# Patient Record
Sex: FEMALE | Race: BLACK OR AFRICAN AMERICAN | Employment: OTHER | URBAN - METROPOLITAN AREA
[De-identification: names, ages, dates, MRNs, and addresses within clinical notes are randomized per-mention and may not be internally consistent; named-entity substitution may affect disease eponyms.]

---

## 2017-01-18 ENCOUNTER — TELEPHONE (OUTPATIENT)
Dept: FAMILY MEDICINE CLINIC | Age: 77
End: 2017-01-18

## 2017-01-18 NOTE — TELEPHONE ENCOUNTER
Patient requesting a return call from Dr Edgar Post to discuss having a terrible cough, a headache and pain in her right shoulder. Her contact # is [phone].

## 2017-01-19 NOTE — TELEPHONE ENCOUNTER
Spoke with patient, ID verified X 2. Patient stated that she has a terrible cough, but it is clear. Patient also stated that she has a headache and right shoulder blade pain that she has for some time now. Patient stated that she has tried Tylenol but her pain comes back later on in the day. Patient denied shortness of breath or numbness. Patient was transferred to [facility] to schedule an office visit to see Dr. Ramón Cr or Dr. Jonah Mercedes.

## 2017-01-23 ENCOUNTER — OFFICE VISIT (OUTPATIENT)
Dept: FAMILY MEDICINE CLINIC | Age: 77
End: 2017-01-23

## 2017-01-23 VITALS
HEART RATE: 88 BPM | WEIGHT: 273 LBS | OXYGEN SATURATION: 96 % | RESPIRATION RATE: 18 BRPM | BODY MASS INDEX: 53.6 KG/M2 | TEMPERATURE: 98.3 F | HEIGHT: 60 IN | DIASTOLIC BLOOD PRESSURE: 60 MMHG | SYSTOLIC BLOOD PRESSURE: 98 MMHG

## 2017-01-23 DIAGNOSIS — H92.03 EAR PAIN, BILATERAL: ICD-10-CM

## 2017-01-23 DIAGNOSIS — M81.0 OSTEOPOROSIS: ICD-10-CM

## 2017-01-23 DIAGNOSIS — F02.80 DEMENTIA IN ALZHEIMER'S DISEASE WITH LATE ONSET: Chronic | ICD-10-CM

## 2017-01-23 DIAGNOSIS — R29.898 WEAKNESS OF BOTH LOWER EXTREMITIES: ICD-10-CM

## 2017-01-23 DIAGNOSIS — M25.572 CHRONIC PAIN OF BOTH ANKLES: ICD-10-CM

## 2017-01-23 DIAGNOSIS — M25.561 CHRONIC PAIN OF BOTH KNEES: ICD-10-CM

## 2017-01-23 DIAGNOSIS — Z90.79 S/P TAH-BSO (TOTAL ABDOMINAL HYSTERECTOMY AND BILATERAL SALPINGO-OOPHORECTOMY): ICD-10-CM

## 2017-01-23 DIAGNOSIS — J45.20 RAD (REACTIVE AIRWAY DISEASE) WITH WHEEZING, MILD INTERMITTENT, UNCOMPLICATED: ICD-10-CM

## 2017-01-23 DIAGNOSIS — G30.1 DEMENTIA IN ALZHEIMER'S DISEASE WITH LATE ONSET: Chronic | ICD-10-CM

## 2017-01-23 DIAGNOSIS — G89.29 CHRONIC PAIN OF BOTH KNEES: ICD-10-CM

## 2017-01-23 DIAGNOSIS — G89.29 CHRONIC PAIN OF BOTH ANKLES: ICD-10-CM

## 2017-01-23 DIAGNOSIS — M25.562 CHRONIC PAIN OF BOTH KNEES: ICD-10-CM

## 2017-01-23 DIAGNOSIS — M48.061 LUMBAR SPINAL STENOSIS: ICD-10-CM

## 2017-01-23 DIAGNOSIS — I10 ESSENTIAL HYPERTENSION: ICD-10-CM

## 2017-01-23 DIAGNOSIS — J30.89 SEASONAL ALLERGIC RHINITIS DUE TO OTHER ALLERGIC TRIGGER: ICD-10-CM

## 2017-01-23 DIAGNOSIS — G44.89 OTHER HEADACHE SYNDROME: ICD-10-CM

## 2017-01-23 DIAGNOSIS — M25.571 CHRONIC PAIN OF BOTH ANKLES: ICD-10-CM

## 2017-01-23 DIAGNOSIS — G44.229 CHRONIC TENSION-TYPE HEADACHE, NOT INTRACTABLE: ICD-10-CM

## 2017-01-23 DIAGNOSIS — R26.81 GAIT INSTABILITY: ICD-10-CM

## 2017-01-23 DIAGNOSIS — I87.8 LOWER EXTREMITY VENOUS STASIS: ICD-10-CM

## 2017-01-23 DIAGNOSIS — R25.1 TREMOR: ICD-10-CM

## 2017-01-23 DIAGNOSIS — Z90.722 S/P TAH-BSO (TOTAL ABDOMINAL HYSTERECTOMY AND BILATERAL SALPINGO-OOPHORECTOMY): ICD-10-CM

## 2017-01-23 DIAGNOSIS — H61.23 BILATERAL IMPACTED CERUMEN: ICD-10-CM

## 2017-01-23 DIAGNOSIS — R10.33 PERIUMBILICAL ABDOMINAL PAIN: ICD-10-CM

## 2017-01-23 DIAGNOSIS — I10 ESSENTIAL HYPERTENSION: Primary | ICD-10-CM

## 2017-01-23 DIAGNOSIS — Z90.710 S/P TAH-BSO (TOTAL ABDOMINAL HYSTERECTOMY AND BILATERAL SALPINGO-OOPHORECTOMY): ICD-10-CM

## 2017-01-23 DIAGNOSIS — H26.9 CATARACT: ICD-10-CM

## 2017-01-23 RX ORDER — GUAIFENESIN DEXTROMETHORPHAN HYDROBROMIDE ORAL SOLUTION 10; 100 MG/5ML; MG/5ML
10 SOLUTION ORAL
Qty: 236 ML | Refills: 0 | Status: SHIPPED | OUTPATIENT
Start: 2017-01-23 | End: 2017-01-25 | Stop reason: SDUPTHER

## 2017-01-23 RX ORDER — ALBUTEROL SULFATE 90 UG/1
2 AEROSOL, METERED RESPIRATORY (INHALATION)
Qty: 1 INHALER | Refills: 1 | Status: SHIPPED | OUTPATIENT
Start: 2017-01-23 | End: 2017-01-25 | Stop reason: SDUPTHER

## 2017-01-23 RX ORDER — LORATADINE 10 MG/1
10 TABLET ORAL DAILY
Qty: 30 TAB | Refills: 11 | Status: SHIPPED | OUTPATIENT
Start: 2017-01-23 | End: 2017-01-25 | Stop reason: SDUPTHER

## 2017-01-23 RX ORDER — MONTELUKAST SODIUM 10 MG/1
10 TABLET ORAL DAILY
Qty: 30 TAB | Refills: 11 | Status: SHIPPED | OUTPATIENT
Start: 2017-01-23 | End: 2017-01-25 | Stop reason: SDUPTHER

## 2017-01-23 NOTE — MR AVS SNAPSHOT
Visit Information Date & Time Provider Department Dept. Phone Encounter #  
 1/23/2017 10:30 AM [facility] [phone] [id number] Follow-up Instructions Return in about 4 months (around 5/23/2017) for referral follow up, results, leg weakness. Routing History Upcoming Health Maintenance Date Due  
 MEDICARE YEARLY EXAM 4/21/2017* GLAUCOMA SCREENING Q2Y 4/1/2017 DTaP/Tdap/Td series (2 - Td) 9/29/2026 *Topic was postponed. The date shown is not the original due date. Allergies as of 1/23/2017  Review Complete On: 1/23/2017 By: Jose L Mcmanus DO Severity Noted Reaction Type Reactions Aristocort [Triamcinolone] High 01/01/1970    Hives Aristocort shot Codeine High 08/10/2009    Itching Lasix [Furosemide] High 08/10/2009    Itching Bupropion  07/25/2011    Other (comments) Severe headache Darvocet A500 [Propoxyphene N-acetaminophen]  09/30/2010    Other (comments)  
 headache Influenza Virus Vaccine Whole  09/30/2009    Other (comments)  
 pharyngitis Mevacor [Lovastatin]  08/10/2009    Other (comments)  
 myalgias Percocet [Oxycodone-acetaminophen]  07/15/2011    Itching Worse headache Rhinocort  08/10/2009    Other (comments)  
 nosebleed Simvastatin  08/10/2009    Other (comments)  
 myalgias Current Immunizations  Reviewed on 1/23/2017 Name Date Influenza Vaccine Split  Deferred (Contraindication) Pneumococcal Vaccine (Unspecified Type)  Deferred (Patient Refused) TB Skin Test (PPD) Intradermal 8/5/2013 TD Vaccine 3/28/2008 Reviewed by Jose L Mcmanus DO on 1/23/2017 at 12:28 PM  
You Were Diagnosed With   
  
 Codes Comments Essential hypertension    -  Primary ICD-10-CM: I10 
ICD-9-CM: 401.9 Dementia in Alzheimer's disease with late onset     ICD-10-CM: G30.1, F02.80 ICD-9-CM: 331.0, 294.10 Lumbar spinal stenosis     ICD-10-CM: M48.06 
ICD-9-CM: 724.02 Periumbilical abdominal pain     ICD-10-CM: R10.33 ICD-9-CM: 789.05 resolved RAD (reactive airway disease) with wheezing, mild intermittent, uncomplicated     QDT-58-VL: J45.20 ICD-9-CM: 493.90 Seasonal allergic rhinitis due to other allergic trigger     ICD-10-CM: J30.89 Other headache syndrome     ICD-10-CM: G44.89 ICD-9-CM: 339.89 L frontal and nasal due to congestion vs ?migraine vs other Osteoporosis     ICD-10-CM: M81.0 ICD-9-CM: 733.00 Gait instability     ICD-10-CM: R26.81 
ICD-9-CM: 607. 2 Weakness of both lower extremities     ICD-10-CM: M62.81 ICD-9-CM: 729.89 due to lumbar stenosis vs BL knee OA and ankle pain Lower extremity venous stasis     ICD-10-CM: I87.8 ICD-9-CM: 459.81 Chronic tension-type headache, not intractable     ICD-10-CM: G50.869 ICD-9-CM: 339.12 Chronic pain of both knees     ICD-10-CM: M25.561, M25.562, G89.29 ICD-9-CM: 719.46, 338.29 due to severe OA vs other Chronic pain of both ankles     ICD-10-CM: M25.571, G89.29, M25.572 ICD-9-CM: 719.47, 338.29 improves with leg elevation Cataract     ICD-10-CM: H26.9 ICD-9-CM: 366.9 Tremor     ICD-10-CM: R25.1 ICD-9-CM: 781.0 due to possible medication side effect, resolved? S/P MAR-BSO (total abdominal hysterectomy and bilateral salpingo-oophorectomy)     ICD-10-CM: Z90.710, Z90.722, Z90.79 ICD-9-CM: V88.01, V45.77 due to post menopausal bleeding Vitals BP Pulse Temp Resp Height(growth percentile) Weight(growth percentile) 98/60 (BP 1 Location: Left arm, BP Patient Position: Sitting) 88 98.3 °F (36.8 °C) (Oral) 18 5' (1.524 m) 273 lb (123.8 kg) SpO2 BMI OB Status Smoking Status 96% 53.32 kg/m2 Postmenopausal Former Smoker BMI and BSA Data Body Mass Index Body Surface Area  
 53.32 kg/m 2 2.29 m 2 Preferred Pharmacy Pharmacy Name Phone [facility] [address] [phone] [address] [phone] Your Updated Medication List  
  
   
This list is accurate as of: 1/23/17 12:35 PM.  Always use your most recent med list.  
  
  
  
  
 albuterol 90 mcg/actuation inhaler Commonly known as:  PROVENTIL HFA, VENTOLIN HFA, PROAIR HFA Take 2 Puffs by inhalation every six (6) hours as needed for Wheezing or Shortness of Breath. Indications: BRONCHOSPASM PREVENTION  
  
 amLODIPine 2.5 mg tablet Commonly known as:  Arlyss [place] Take 1 tablet by mouth daily. aspirin 81 mg chewable tablet Take 1 tablet by mouth daily. bumetanide 1 mg tablet Commonly known as:  Valeriaaddison Caneloas TAKE 1 TABLET BY MOUTH EVERY DAY  
  
 cephALEXin 500 mg capsule Commonly known as:  Filipedalia Baldwin Take 1 capsule by mouth 2 times a day for 7 days. Indications: BACTERIAL URINARY TRACT INFECTION  
  
 cholecalciferol 1,000 unit tablet Commonly known as:  VITAMIN D3 Take 1 tablet by mouth daily. fluticasone 50 mcg/actuation nasal spray Commonly known as:  Michael Riser 2 Sprays by Both Nostrils route daily. Indications: ALLERGIC RHINITIS  
  
 guaiFENesin-dextromethorphan  mg/5 mL Liqd Commonly known as:  SILTUSSIN DM CARMONA Take 10 mL by mouth every four (4) hours as needed. Indications: COLD SYMPTOMS HYDROcodone-acetaminophen 7.5-325 mg per tablet Commonly known as:  Angelica President Take 1 Tab by mouth every six (6) hours as needed for Pain. Indications: PAIN  
  
 lisinopril 5 mg tablet Commonly known as:  Josesito Shutters Take 1 tablet by mouth daily. loratadine 10 mg tablet Commonly known as:  Marco Hippo Take 1 Tab by mouth daily. Indications: ALLERGIC RHINITIS Mapap 325 mg tablet Generic drug:  acetaminophen TAKE 2 TABLETS BY MOUTH EVERY 6-8 HOURS AS NEEDED FOR PAIN  
  
 mirtazapine 15 mg disintegrating tablet Commonly known as:  REMERON SOL-TAB Take 15 mg by mouth nightly. montelukast 10 mg tablet Commonly known as:  SINGULAIR Take 1 Tab by mouth daily. Indications: ALLERGIC RHINITIS  
  
 nystatin 100,000 unit/gram ointment Commonly known as:  MYCOSTATIN Apply  to affected area two (2) times a day. ondansetron 8 mg disintegrating tablet Commonly known as:  ZOFRAN ODT Take 1 Tab by mouth every eight (8) hours as needed for Nausea. OTHER Please dispense an Ultra light Wheelchair DX.severe osteoarthritis, fibromyalgia, Bell's Palsy, DJD,muscle weakness, lumbar stenosis, knee and shoulder pain  
  
 pantoprazole 40 mg tablet Commonly known as:  PROTONIX Take 40 mg by mouth daily. potassium chloride SR 10 mEq tablet Commonly known as:  KLOR-CON 10  
TAKE 1 TABLET BY MOUTH EVERY DAY Prescriptions Sent to Pharmacy Refills  
 loratadine (CLARITIN) 10 mg tablet 11 Sig: Take 1 Tab by mouth daily. Indications: ALLERGIC RHINITIS Class: Normal  
 Pharmacy: [address] [place] [address] Ph #: [phone] Route: Oral  
 albuterol (PROVENTIL HFA, VENTOLIN HFA, PROAIR HFA) 90 mcg/actuation inhaler 1 Sig: Take 2 Puffs by inhalation every six (6) hours as needed for Wheezing or Shortness of Breath. Indications: BRONCHOSPASM PREVENTION Class: Normal  
 Pharmacy: [address] [place] [address] Ph #: [phone] Route: Inhalation  
 guaiFENesin-dextromethorphan (SILTUSSIN DM CARMONA)  mg/5 mL liqd 0 Sig: Take 10 mL by mouth every four (4) hours as needed. Indications: COLD SYMPTOMS Class: Normal  
 Pharmacy: [address] [place] [address] Ph #: [phone] Route: Oral  
 montelukast (SINGULAIR) 10 mg tablet 11 Sig: Take 1 Tab by mouth daily. Indications: ALLERGIC RHINITIS Class: Normal  
 Pharmacy: [address] [place] [address] Ph #: [phone] Route: Oral  
  
We Performed the Following REFERRAL TO PHARMACIST [[id number] Custom] Comments:  
 Please evaluate patient for med rec and mwv Follow-up Instructions Return in about 4 months (around 5/23/2017) for referral follow up, results, leg weakness. To-Do List   
 01/23/2017 Lab:  MICROALBUMIN, UR, RAND W/ MICROALBUMIN/CREA RATIO   
  
 01/23/2017 Lab:  URINALYSIS W/ RFLX MICROSCOPIC Referral Information Referral ID Referred By Referred To  
  
 [id number] Meenu Perez, [address] [address] Visits Status Start Date End Date 1 New Request 1/23/17 1/23/18 If your referral has a status of pending review or denied, additional information will be sent to support the outcome of this decision. Introducing [facility]! Leobardo Ramirez introduces [employer] patient portal. Now you can access parts of your medical record, email your doctor's office, and request medication refills online. 1. In your internet browser, go to [email or web address] 2. Click on the First Time User? Click Here link in the Sign In box. You will see the New Member Sign Up page. 3. Enter your [employer] Access Code exactly as it appears below. You will not need to use this code after youve completed the sign-up process. If you do not sign up before the expiration date, you must request a new code. · [employer] Access Code: [email or web address] Expires: 4/23/2017 12:34 PM 
 
4. Enter the last four digits of your Social Security Number (xxxx) and Date of Birth (mm/dd/yyyy) as indicated and click Submit. You will be taken to the next sign-up page. 5. Create a [employer] ID. This will be your [employer] login ID and cannot be changed, so think of one that is secure and easy to remember. 6. Create a [employer] password. You can change your password at any time. 7. Enter your Password Reset Question and Answer.  This can be used at a later time if you forget your password. 8. Enter your e-mail address. You will receive e-mail notification when new information is available in [address] 9. Click Sign Up. You can now view and download portions of your medical record. 10. Click the Download Summary menu link to download a portable copy of your medical information. If you have questions, please visit the Frequently Asked Questions section of the [employer] website. Remember, [employer] is NOT to be used for urgent needs. For medical emergencies, dial 911. Now available from your iPhone and Android! Please provide this summary of care documentation to your next provider. Your primary care clinician is listed as Hazel Pires. If you have any questions after today's visit, please call [phone].

## 2017-01-23 NOTE — ACP (ADVANCE CARE PLANNING)
Discussed ACP with patient. Gave pt an [facility] folder. Accepts referral to [facility]oring Choices team.  NN Nahid Durant RN and Linsey Inman RN notified by staff message for follow up.

## 2017-01-23 NOTE — PROGRESS NOTES
Chief Complaint   Patient presents with    Shoulder Pain     left shoulder and the pain goes all the way down her arm.  Cough     coughing up white stuff stated by patient    Headache    Ear Pain     patient stated that when she swallows her ear aches. 1. Have you been to the ER, urgent care clinic since your last visit? Hospitalized since your last visit? no    2. Have you seen or consulted any other health care providers outside of the [address] [place] [address] since your last visit? Include any pap smears or colon screening. no    The patient was counseled on the dangers of tobacco use, and was advised to quit and does not smoke    Reviewed strategies to maximize success, including conitnue not to smoke.     ACP on file

## 2017-01-23 NOTE — PROGRESS NOTES
HISTORY OF PRESENT ILLNESS  Stephanychu Jdfaviola Moss is a 64 y.o. female presents with Shoulder Pain (left shoulder and the pain goes all the way down her arm.); Cough (coughing up white stuff stated by patient); Headache; Ear Pain (patient stated that when she swallows her ear aches.); and Referral Follow Up    Agree with nurse note. Her sister is present today. Pt's assisted living facility is now called [facility], changed from [facility]. Pleasantly demented pt with hypertension and hyperglycemia presents to the office with a BP of 98/60. Her weight is stable at 273 lbs but it appears she has gained about 73 lbs since 09/2015, which she attributes to the good food at her assisted living facility and not exercising as much. She also likes to drink sweet tea and believes she should drink water more. On 6/11/5, her Hgb A1C was 6.2. Pt with cataracts is followed by optometrist, Dr. Bucky Cosme, whom she believes she saw last year. Pt with hx of seasonal allergies. She complains of a productive cough with white phlegm and nasal congestion with yellow to white mucus x2 weeks. She has some chest tightness with some SOB. She has had a HA but today it is not present. Her eyes have felt itchy and she has BL ear pain. Denies fever/chills, wheezing, body aches, or other associated sxs. Pt complains of abdominal pain off and on. She is s/p MAR-BSO since 9/14/16 due to post-menopausal bleeding performed by GYN, Dr. Jo-Ann Andrews. Pt complains of BL ankle pain with swelling when she is in bed. She elevates them with relief of the pain and edema. Pt with lumbar spinal stenosis, BL LE weakness, BL chronic knee pain, osteoporosis, and chronic tension type HAs. She has gait instability and is wheelchair bound. She does not participate in physical therapy because Medicare will only cover 8 weeks. She last saw neurology, NP Estefany Mckoy on 8/4/15 for memory, tremors, gait, and HAs. Denies recurrence of tremors. Written by denisse Grace, as dictated by Dr. Saeid Cohen DO.    ROS    Review of Systems negative except as noted above in HPI. ALLERGIES:    Allergies   Allergen Reactions    Aristocort [Triamcinolone] Hives     Aristocort shot    Codeine Itching    Lasix [Furosemide] Itching    Bupropion Other (comments)     Severe headache    Darvocet A500 [Propoxyphene N-Acetaminophen] Other (comments)     headache    Influenza Virus Vaccine Whole Other (comments)     pharyngitis    Mevacor [Lovastatin] Other (comments)     myalgias    Percocet [Oxycodone-Acetaminophen] Itching     Worse headache    Rhinocort Other (comments)     nosebleed    Simvastatin Other (comments)     myalgias       CURRENT MEDICATIONS:    Outpatient Prescriptions Marked as Taking for the 1/23/17 encounter (Office Visit) with Porsha Dill DO   Medication Sig Dispense Refill    loratadine (CLARITIN) 10 mg tablet Take 1 Tab by mouth daily. Indications: ALLERGIC RHINITIS 30 Tab 11    albuterol (PROVENTIL HFA, VENTOLIN HFA, PROAIR HFA) 90 mcg/actuation inhaler Take 2 Puffs by inhalation every six (6) hours as needed for Wheezing or Shortness of Breath. Indications: BRONCHOSPASM PREVENTION 1 Inhaler 1    guaiFENesin-dextromethorphan (SILTUSSIN DM CARMONA)  mg/5 mL liqd Take 10 mL by mouth every four (4) hours as needed. Indications: COLD SYMPTOMS 236 mL 0    montelukast (SINGULAIR) 10 mg tablet Take 1 Tab by mouth daily. Indications: ALLERGIC RHINITIS 30 Tab 11    nystatin (MYCOSTATIN) 100,000 unit/gram ointment Apply  to affected area two (2) times a day. 15 g 5    cephALEXin (KEFLEX) 500 mg capsule Take 1 capsule by mouth 2 times a day for 7 days. Indications: BACTERIAL URINARY TRACT INFECTION 14 Cap 0    HYDROcodone-acetaminophen (NORCO) 7.5-325 mg per tablet Take 1 Tab by mouth every six (6) hours as needed for Pain.  Indications: PAIN 40 Tab 0    pantoprazole (PROTONIX) 40 mg tablet Take 40 mg by mouth daily.      MAPAP 325 mg tablet TAKE 2 TABLETS BY MOUTH EVERY 6-8 HOURS AS NEEDED FOR PAIN 90 Tab 0    mirtazapine (REMERON SOL-TAB) 15 mg disintegrating tablet Take 15 mg by mouth nightly.  fluticasone (FLONASE) 50 mcg/actuation nasal spray 2 Sprays by Both Nostrils route daily. Indications: ALLERGIC RHINITIS 1 Bottle 6    ondansetron (ZOFRAN ODT) 8 mg disintegrating tablet Take 1 Tab by mouth every eight (8) hours as needed for Nausea. 30 Tab 0    OTHER Please dispense an Ultra light Wheelchair DX.severe osteoarthritis, fibromyalgia, Bell's Palsy, DJD,muscle weakness, lumbar stenosis, knee and shoulder pain 1 Device 0    amlodipine (NORVASC) 2.5 mg tablet Take 1 tablet by mouth daily. 30 tablet 11    aspirin 81 mg chewable tablet Take 1 tablet by mouth daily. 30 tablet 11    bumetanide (BUMEX) 1 mg tablet TAKE 1 TABLET BY MOUTH EVERY DAY 30 tablet 11    cholecalciferol (VITAMIN D3) 1,000 unit tablet Take 1 tablet by mouth daily. 30 tablet 11    potassium chloride SR (KLOR-CON 10) 10 mEq tablet TAKE 1 TABLET BY MOUTH EVERY DAY 30 tablet 11    lisinopril (PRINIVIL, ZESTRIL) 5 mg tablet Take 1 tablet by mouth daily. 30 tablet 11       PAST MEDICAL HISTORY:    Past Medical History   Diagnosis Date    Abnormal cardiovascular stress test 04/18/08     Dr. Junior Gar    Allergy, unspecified not elsewhere classified     Bell's palsy 02/2005     Left. Dr. Brisa Mott CAD (coronary artery disease)      Dr. Izaiah Feldman. Dr. Murray Melo Chest pain 2002     Dr. Demetrius Messer Chronic headache 2013     due to depression. Dr. Gisella Pastrana.  Chronic pain      LEGS AND FEET; BACK UNDER RIGHT SHOULDER    Congenital eye disorder      Lazy eyes. Dr. Sebastian Peng.  Depression 2011     Dr. Gemma Schwartz (psychologist). Dr. Gisella Pastrana. Dr. Alisa Fuentes.  DJD (degenerative joint disease)      knees, low back. Dr. Carl Thompson. Dr. Allison Noss     Dr. Cynthia Villalobos. Dr. Shobha Duarte Gait instability 02/10/09     Dr. Andrew Gomez. Dr. Gema Jiménez.  Gallstones     GERD (gastroesophageal reflux disease)     Hiatal hernia 1977    Hypercholesteremia     Hypertension     Ill-defined condition      \"JUST ONE NIGHT I WENT TO STAND UP AND I JUST COULDN'T STAND\"  PT NOT SURE WHY    Leg weakness 12/2009     Dr. hSannon Cuevas. Dr. Makenzie Duff.  Lower extremity venous stasis      Dr. Gama Banks    Lumbar spinal stenosis 12/2009     L3-5. Dr. Vigil Scot. PARAPLEGIA SINCE 2012    Lymphedema 12/19/08     Dr. Reji Evans then Dr. Dedra Bahena    Memory loss     Menopause     Migraine      Ocular. Dr. Ruma Gibbons    NORMA (obstructive sleep apnea) 01/24/07     Dr. Aarti Meyer. NO CPAP    Osteoporosis 02/10/09     Dr. Law Bocanegra    Pancreatitis 03/2011    Post-menopausal bleeding 07/2016     due to Dr. Shola East Jr.    Rotator cuff injury      Left. Dr. Magui Hannah; chronic bilateral    Seborrheic keratosis 2005     Dr. Howard Bolanos 02/05/09     Dr. Yolande Orozco   [employer] Tremor 2013     Dr. Gema Jiménez.  Unspecified vitamin D deficiency 07/2009    Urinary incontinence 2013     Dr. Vigil Slight Venous insufficiency 1998     R>L. with chronic leg edema. Dr. Colmenares Organ       PAST SURGICAL HISTORY:    Past Surgical History   Procedure Laterality Date    Hx heart catheterization  06/2008     Dr. Avi Don. due to abnormal Stress Test    Hysteroscopic myomectomy  07/27/2016     D AND C WITH SYMPHION, RESECTION OF ENDOMETRIAL TISSUE. benign. due to postmenopausal bleeding.   Dr. Shola East Jr.    Hx charlie and bso  09/14/2016     Dr. Avi Beaver Jr        FAMILY HISTORY:    Family History   Problem Relation Age of Onset    Hypertension Mother     Arthritis-osteo Mother     Dementia Mother     Cancer Father      liver    Heart Attack Father     Other Father kidney stones    Stroke Maternal Grandmother     Hypertension Sister     Arthritis-osteo Sister     Diabetes Paternal Aunt     Anesth Problems Neg Hx        SOCIAL HISTORY:    Social History     Social History    Marital status: SINGLE     Spouse name: N/A    Number of children: N/A    Years of education: N/A     Social History Main Topics    Smoking status: Former Smoker     Packs/day: 0.25     Years: 0.50     Types: Cigarettes     Quit date: 1/1/1962    Smokeless tobacco: Never Used      Comment: Pt smoked 3-4 cigs x 9 mos. Pt lived with smoker dad x 40 yrs    Alcohol use No    Drug use: No    Sexual activity: Yes     Partners: Male     Birth control/ protection: Condom     Other Topics Concern    None     Social History Narrative       IMMUNIZATIONS:    Immunization History   Administered Date(s) Administered    TB Skin Test (PPD) Intradermal 08/05/2013    TD Vaccine 03/28/2008       PHYSICAL EXAMINATION    Vital Signs    Visit Vitals    BP 98/60 (BP 1 Location: Left arm, BP Patient Position: Sitting)    Pulse 88    Temp 98.3 °F (36.8 °C) (Oral)    Resp 18    Ht 5' (1.524 m)    Wt 273 lb (123.8 kg)  Comment: patient unable to stand for current weight    SpO2 96%    BMI 53.32 kg/m2       Weight Metrics 1/23/2017 9/29/2016 7/27/2016 7/22/2016 7/5/2016 3/15/2016 9/24/2015   Weight 273 lb 273 lb 273 lb 222 lb 220 lb - 200 lb   BMI 53.32 kg/m2 53.32 kg/m2 53.32 kg/m2 43.36 kg/m2 41.59 kg/m2 - 37.81 kg/m2   Some encounter information is confidential and restricted. Go to Review Flowsheets activity to see all data. General appearance - Well nourished. Well appearing. Well developed. No acute distress. Overweight. Head - Normocephalic. Atraumatic. Pain on palpation at BL maxillary sinuses. Eyes - pupils equal and reactive. Extraocular eye movements intact. Sclera anicteric. Mildly injected sclera. Ears - Hearing is grossly normal bilaterally.   BL EACs totally occluded with yellow cerumen. Nose - normal and patent. No polyps noted. No erythema. No discharge. Mouth - mucous membranes with adequate moisture. Posterior pharynx normal with cobblestone appearance. No erythema, white exudate or obstruction. Neck - supple. Midline trachea. No carotid bruits noted bilaterally. No thyromegaly noted. Chest - clear to auscultation bilaterally anteriorly and posteriorly. No wheezes. No rales or rhonchi. Breath sounds are symmetrical bilaterally. Unlabored respirations. Heart - normal rate. Regular rhythm. Normal S1, S2. No murmur noted. No rubs, clicks or gallops noted. Abdomen - soft and distended. No masses or organomegaly. No rebound, rigidity or guarding. Bowel sounds normal x 4 quadrants. Mild pain on palpation at R periumbilical region. Neurological - awake, alert and oriented to person, place, and time and event. Cranial nerves II through XII intact. Clear speech. Muscle strength is +5/5 x 4 extremities. Sensation is intact to light touch bilaterally. Wheel chair bound. Heme/Lymph - peripheral pulses normal x 4 extremities. No peripheral edema is noted. Musculoskeletal - Intact x 4 extremities. Full ROM x 4 extremities. BL shoulder and knee pain with movement. Back exam - normal range of motion. No pain on palpation of the spinous processes in the cervical, thoracic, lumbar, sacral regions. No CVA tenderness. Skin - no rashes, erythema, ecchymosis, lacerations, abrasions, suspicious moles noted. Hyperpigmented discoloration in BL LE with thickened skin. Pain on palpation to light touch. Psychological -   normal behavior, dress and thought processes. Good insight. Good eye contact. Normal affect. Appropriate mood. Normal speech.       DATA REVIEWED    Lab Results   Component Value Date/Time    WBC 7.3 07/05/2016 03:01 PM    Hemoglobin (POC) 12.6 12/26/2009 09:27 PM    HGB 11.9 07/05/2016 03:01 PM    Hematocrit (POC) 37 12/26/2009 09:27 PM HCT 38.7 07/05/2016 03:01 PM    PLATELET 568 20/53/0071 03:01 PM    MCV 92.4 07/05/2016 03:01 PM     Lab Results   Component Value Date/Time    Sodium 141 07/05/2016 03:01 PM    Potassium 3.8 07/05/2016 03:01 PM    Chloride 103 07/05/2016 03:01 PM    CO2 30 07/05/2016 03:01 PM    Anion gap 8 07/05/2016 03:01 PM    Glucose 107 07/05/2016 03:01 PM    Glucose 78 08/14/2013 06:00 AM    BUN 16 07/05/2016 03:01 PM    Creatinine 1.01 07/05/2016 03:01 PM    BUN/Creatinine ratio 16 07/05/2016 03:01 PM    GFR est AA >60 07/05/2016 03:01 PM    GFR est non-AA 53 07/05/2016 03:01 PM    Calcium 8.7 07/05/2016 03:01 PM    Bilirubin, total 0.2 07/05/2016 03:01 PM    ALT 16 07/05/2016 03:01 PM    AST 12 07/05/2016 03:01 PM    Alk. phosphatase 91 07/05/2016 03:01 PM    Protein, total 7.2 07/05/2016 03:01 PM    Albumin 3.3 07/05/2016 03:01 PM    Globulin 3.9 07/05/2016 03:01 PM    A-G Ratio 0.8 07/05/2016 03:01 PM     Lab Results   Component Value Date/Time    Cholesterol, total 240 08/14/2013 06:00 AM    HDL Cholesterol 52 08/14/2013 06:00 AM    LDL, calculated 162.6 08/14/2013 06:00 AM    VLDL, calculated 25.4 08/14/2013 06:00 AM    Triglyceride 127 08/14/2013 06:00 AM    CHOL/HDL Ratio 4.6 08/14/2013 06:00 AM     Lab Results   Component Value Date/Time    Vitamin D 25-Hydroxy 24 08/05/2010 03:54 PM    VITAMIN D, 25-HYDROXY 47.2 10/17/2014 10:57 AM       Lab Results   Component Value Date/Time    Hemoglobin A1c 5.9 10/17/2014 10:57 AM    Hemoglobin A1c, External 6.2 06/11/2015     Lab Results   Component Value Date/Time    TSH 1.260 10/17/2014 10:57 AM       ASSESSMENT and PLAN      ICD-10-CM ICD-9-CM    1. Essential hypertension I10 401.9 MICROALBUMIN, UR, RAND W/ MICROALBUMIN/CREA RATIO      URINALYSIS W/ RFLX MICROSCOPIC      REFERRAL TO PHARMACIST   2. Dementia in Alzheimer's disease with late onset G30.1 331.0     F02.80 294.10    3. Lumbar spinal stenosis M48.06 724.02    4.  Periumbilical abdominal pain R10.33 789.05 resolved   5. RAD (reactive airway disease) with wheezing, mild intermittent, uncomplicated V92.21 458.78    6. Seasonal allergic rhinitis due to other allergic trigger J30.89     7. Other headache syndrome G44.89 339.89     L frontal and nasal due to congestion vs ?migraine vs other   8. Osteoporosis M81.0 733.00    9. Gait instability R26.81 781.2    10. Weakness of both lower extremities M62.81 729.89     due to lumbar stenosis vs BL knee OA and ankle pain   11. Lower extremity venous stasis I87.8 459.81    12. Chronic tension-type headache, not intractable G44.229 339.12    13. Chronic pain of both knees M25.561 719.46     M25.562 338.29     G89.29      due to severe OA vs other   14. Chronic pain of both ankles M25.571 719.47     G89.29 338.29     M25.572      improves with leg elevation   15. Cataract H26.9 366.9    16. Tremor R25.1 781.0     due to possible medication side effect, resolved? 17. S/P MAR-BSO (total abdominal hysterectomy and bilateral salpingo-oophorectomy) Z90.710 V88.01     Z90.722 V45.77     Z90.79      due to post menopausal bleeding   18. Bilateral impacted cerumen H61.23 380.4 REMOVE IMPACTED EAR WAX   19. Ear pain, bilateral H92.03 388.70        Discussed the patient's BMI with her. The BMI follow up plan is as follows: I have counseled this patient on diet and exercise regimens. Addressed weight, diet and exercise with patient. Decrease carbohydrates (white foods, sweet foods, sweet drinks and alcohol), increase green leafy vegetables and protein (lean meats and beans) with each meal.  Avoid fried foods. Eat 3-5 small meals daily. Do not skip meals. Increase water intake. Increase physical activity to 30 minutes daily for health benefit or 60 minutes daily to prevent weight regain, as tolerated. Get 7-8 hours uninterrupted sleep nightly. Chart reviewed and updated. Continue current medications and care. Start Singulair 10 mg and Claritin 10 mg daily.  Use Albuterol HFA 90 mcg 2 puffs prn SOB/wheezing. Take Siltussin DM 10 mL q4 hours prn. Prescriptions written and sent to pharmacy; medication side effects discussed. Singulair 10 mg. Claritin 10 mg. Albuterol HFA 90 mcg. Siltussin DM  mg/5 mL liquid. Recheck pertinent labs when fasting. Recent office visit notes from [facility] reviewed. Get recent office visit notes from Dr. Lionel Lopez. Advised pt to sign release. Referrals given; patient urged to keep appointments with specialists. PharmD. Counseled patient on health concerns: Allergies, ankle care, weight management, and weakness. Immunizations noted. Declines vaccines. Procedure explained. Verbal consent received. BL ear irrigation performed without complication. Pt tolerated procedure well. Offered empathy, support, legitimation, prayers, partnership to patient. Praised patient for progress. [employer] folder given at office visit. Dicussed with pt and sister today. Referred pt to [facility]. Staff message sent to [facility] team.   Follow-up Disposition:  Return in about 4 months (around 5/23/2017) for referral follow up, results, leg weakness. Patient was offered a choice/choices in the treatment plan today. Patient expresses understanding of the plan and agrees with recommendations. Patient declines any additional handouts. Patient is satisfied with previous handouts received from our office    More than 40 mins spent face to face with patient and more than 50% of this time spent in counseling and coordinating care. Written by denisse Morales, as dictated by Dr. Trish Garay DO. Documentation True and Accepted by Camacho Hare.  Ramesh Celeste.

## 2017-01-25 NOTE — TELEPHONE ENCOUNTER
Sharda with [facility] has called regarding Mateusz Garcia. Je Nickel number is [phone]. She is stating we have sent prescription order to wrong pharmacy, that Mrs. Brad Huynh is no longer dealing with [facility], as they will not send them hard copies.  Please call in all prescriptions and any new prescriptions ordered today to [facility] Pharmacist, [phone],

## 2017-01-26 RX ORDER — ALBUTEROL SULFATE 90 UG/1
2 AEROSOL, METERED RESPIRATORY (INHALATION)
Qty: 1 INHALER | Refills: 1 | Status: SHIPPED | OUTPATIENT
Start: 2017-01-26 | End: 2017-05-03 | Stop reason: SDUPTHER

## 2017-01-26 RX ORDER — LORATADINE 10 MG/1
10 TABLET ORAL DAILY
Qty: 30 TAB | Refills: 11 | Status: SHIPPED | OUTPATIENT
Start: 2017-01-26 | End: 2017-08-03 | Stop reason: ALTCHOICE

## 2017-01-26 RX ORDER — MONTELUKAST SODIUM 10 MG/1
10 TABLET ORAL DAILY
Qty: 30 TAB | Refills: 11 | Status: SHIPPED | OUTPATIENT
Start: 2017-01-26 | End: 2017-08-03 | Stop reason: ALTCHOICE

## 2017-01-26 RX ORDER — GUAIFENESIN DEXTROMETHORPHAN HYDROBROMIDE ORAL SOLUTION 10; 100 MG/5ML; MG/5ML
10 SOLUTION ORAL
Qty: 236 ML | Refills: 0 | Status: SHIPPED | OUTPATIENT
Start: 2017-01-26 | End: 2018-04-05 | Stop reason: ALTCHOICE

## 2017-02-01 ENCOUNTER — TELEPHONE (OUTPATIENT)
Dept: FAMILY MEDICINE CLINIC | Age: 77
End: 2017-02-01

## 2017-02-01 NOTE — TELEPHONE ENCOUNTER
Spoke with Domonique Ortiz at the [facility] and she need lab orders for patient to be faxed. Lab orders has been faxed.

## 2017-02-01 NOTE — TELEPHONE ENCOUNTER
Sue Morelos from [facility] on behalf of patient. She is requesting a return call. Says she has some questions concerning patient. Her contact # is [phone].

## 2017-05-04 RX ORDER — ALBUTEROL SULFATE 90 UG/1
2 AEROSOL, METERED RESPIRATORY (INHALATION)
Qty: 1 INHALER | Refills: 1 | Status: SHIPPED | OUTPATIENT
Start: 2017-05-04 | End: 2018-05-01 | Stop reason: ALTCHOICE

## 2017-06-26 ENCOUNTER — TELEPHONE (OUTPATIENT)
Dept: FAMILY MEDICINE CLINIC | Age: 77
End: 2017-06-26

## 2017-06-26 NOTE — TELEPHONE ENCOUNTER
[phone] nurse calling on behalf of patient. She is requesting an order for patient to get ot and skill nursing to evaluate and treat. . Says patient is having edema issues. Her contact # is [phone].

## 2017-06-27 NOTE — TELEPHONE ENCOUNTER
Spoke to Jethro( [facility]) and informed her per Dr. Suha Gardner order) for patient to receive nursing, PT and OT

## 2017-06-27 NOTE — TELEPHONE ENCOUNTER
Spoke with Rachael from Home health and she stated that the [facility] would like for Dr. Yeimi Schulz to give a verbal order for patient to receive nursing, PT and OT for dementia and alzheimers

## 2017-07-24 ENCOUNTER — TELEPHONE (OUTPATIENT)
Dept: FAMILY MEDICINE CLINIC | Age: 77
End: 2017-07-24

## 2017-07-24 NOTE — TELEPHONE ENCOUNTER
----- Message from [facility] sent at 7/24/2017 11:18 AM EDT -----  Regarding: Dr. Anshul Owens  Pt is returning a call she received from the practice. Pt can be reached at [phone].

## 2017-07-25 ENCOUNTER — APPOINTMENT (OUTPATIENT)
Dept: CT IMAGING | Age: 77
End: 2017-07-25
Attending: EMERGENCY MEDICINE
Payer: MEDICARE

## 2017-07-25 ENCOUNTER — HOSPITAL ENCOUNTER (EMERGENCY)
Age: 77
Discharge: HOME | End: 2017-07-25
Attending: EMERGENCY MEDICINE
Payer: MEDICARE

## 2017-07-25 ENCOUNTER — APPOINTMENT (OUTPATIENT)
Dept: GENERAL RADIOLOGY | Age: 77
End: 2017-07-25
Attending: EMERGENCY MEDICINE
Payer: MEDICARE

## 2017-07-25 VITALS
OXYGEN SATURATION: 95 % | DIASTOLIC BLOOD PRESSURE: 88 MMHG | HEIGHT: 61 IN | TEMPERATURE: 98.6 F | WEIGHT: 273 LBS | RESPIRATION RATE: 16 BRPM | BODY MASS INDEX: 51.54 KG/M2 | HEART RATE: 84 BPM | SYSTOLIC BLOOD PRESSURE: 137 MMHG

## 2017-07-25 DIAGNOSIS — R51.9 NONINTRACTABLE HEADACHE, UNSPECIFIED CHRONICITY PATTERN, UNSPECIFIED HEADACHE TYPE: Primary | ICD-10-CM

## 2017-07-25 LAB
ALBUMIN SERPL BCP-MCNC: 3.3 G/DL (ref 3.5–5)
ALBUMIN/GLOB SERPL: 0.8 {RATIO} (ref 1.1–2.2)
ALP SERPL-CCNC: 90 U/L (ref 45–117)
ALT SERPL-CCNC: 14 U/L (ref 12–78)
ANION GAP BLD CALC-SCNC: 4 MMOL/L (ref 5–15)
APPEARANCE UR: ABNORMAL
AST SERPL W P-5'-P-CCNC: 11 U/L (ref 15–37)
BACTERIA URNS QL MICRO: ABNORMAL /HPF
BASOPHILS # BLD AUTO: 0 K/UL (ref 0–0.1)
BASOPHILS # BLD: 0 % (ref 0–1)
BILIRUB SERPL-MCNC: 0.2 MG/DL (ref 0.2–1)
BILIRUB UR QL: NEGATIVE
BUN SERPL-MCNC: 9 MG/DL (ref 6–20)
BUN/CREAT SERPL: 9 (ref 12–20)
CALCIUM SERPL-MCNC: 8.3 MG/DL (ref 8.5–10.1)
CHLORIDE SERPL-SCNC: 101 MMOL/L (ref 97–108)
CO2 SERPL-SCNC: 34 MMOL/L (ref 21–32)
COLOR UR: ABNORMAL
CREAT SERPL-MCNC: 0.96 MG/DL (ref 0.55–1.02)
EOSINOPHIL # BLD: 0.2 K/UL (ref 0–0.4)
EOSINOPHIL NFR BLD: 4 % (ref 0–7)
EPITH CASTS URNS QL MICRO: ABNORMAL /LPF
ERYTHROCYTE [DISTWIDTH] IN BLOOD BY AUTOMATED COUNT: 14.9 % (ref 11.5–14.5)
ERYTHROCYTE [SEDIMENTATION RATE] IN BLOOD: 24 MM/HR (ref 0–30)
GLOBULIN SER CALC-MCNC: 3.9 G/DL (ref 2–4)
GLUCOSE SERPL-MCNC: 120 MG/DL (ref 65–100)
GLUCOSE UR STRIP.AUTO-MCNC: NEGATIVE MG/DL
HCT VFR BLD AUTO: 40.1 % (ref 35–47)
HGB BLD-MCNC: 12.5 G/DL (ref 11.5–16)
HGB UR QL STRIP: NEGATIVE
KETONES UR QL STRIP.AUTO: NEGATIVE MG/DL
LEUKOCYTE ESTERASE UR QL STRIP.AUTO: NEGATIVE
LYMPHOCYTES # BLD AUTO: 32 % (ref 12–49)
LYMPHOCYTES # BLD: 2.2 K/UL (ref 0.8–3.5)
MCH RBC QN AUTO: 28.7 PG (ref 26–34)
MCHC RBC AUTO-ENTMCNC: 31.2 G/DL (ref 30–36.5)
MCV RBC AUTO: 92.2 FL (ref 80–99)
MONOCYTES # BLD: 0.4 K/UL (ref 0–1)
MONOCYTES NFR BLD AUTO: 6 % (ref 5–13)
NEUTS SEG # BLD: 4 K/UL (ref 1.8–8)
NEUTS SEG NFR BLD AUTO: 58 % (ref 32–75)
NITRITE UR QL STRIP.AUTO: NEGATIVE
PH UR STRIP: 7 [PH] (ref 5–8)
PLATELET # BLD AUTO: 294 K/UL (ref 150–400)
POTASSIUM SERPL-SCNC: 3.3 MMOL/L (ref 3.5–5.1)
PROT SERPL-MCNC: 7.2 G/DL (ref 6.4–8.2)
PROT UR STRIP-MCNC: NEGATIVE MG/DL
RBC # BLD AUTO: 4.35 M/UL (ref 3.8–5.2)
RBC #/AREA URNS HPF: ABNORMAL /HPF (ref 0–5)
SODIUM SERPL-SCNC: 139 MMOL/L (ref 136–145)
SP GR UR REFRACTOMETRY: 1.01 (ref 1–1.03)
UA: UC IF INDICATED,UAUC: ABNORMAL
UROBILINOGEN UR QL STRIP.AUTO: 1 EU/DL (ref 0.2–1)
WBC # BLD AUTO: 6.8 K/UL (ref 3.6–11)
WBC URNS QL MICRO: ABNORMAL /HPF (ref 0–4)

## 2017-07-25 PROCEDURE — 87186 SC STD MICRODIL/AGAR DIL: CPT | Performed by: EMERGENCY MEDICINE

## 2017-07-25 PROCEDURE — 74011250637 HC RX REV CODE- 250/637: Performed by: EMERGENCY MEDICINE

## 2017-07-25 PROCEDURE — 87086 URINE CULTURE/COLONY COUNT: CPT | Performed by: EMERGENCY MEDICINE

## 2017-07-25 PROCEDURE — 70450 CT HEAD/BRAIN W/O DYE: CPT

## 2017-07-25 PROCEDURE — 93005 ELECTROCARDIOGRAM TRACING: CPT

## 2017-07-25 PROCEDURE — 81001 URINALYSIS AUTO W/SCOPE: CPT | Performed by: EMERGENCY MEDICINE

## 2017-07-25 PROCEDURE — 80053 COMPREHEN METABOLIC PANEL: CPT | Performed by: EMERGENCY MEDICINE

## 2017-07-25 PROCEDURE — 36415 COLL VENOUS BLD VENIPUNCTURE: CPT | Performed by: EMERGENCY MEDICINE

## 2017-07-25 PROCEDURE — 99285 EMERGENCY DEPT VISIT HI MDM: CPT

## 2017-07-25 PROCEDURE — 85652 RBC SED RATE AUTOMATED: CPT | Performed by: EMERGENCY MEDICINE

## 2017-07-25 PROCEDURE — 71010 XR CHEST PORT: CPT

## 2017-07-25 PROCEDURE — 87077 CULTURE AEROBIC IDENTIFY: CPT | Performed by: EMERGENCY MEDICINE

## 2017-07-25 PROCEDURE — 85025 COMPLETE CBC W/AUTO DIFF WBC: CPT | Performed by: EMERGENCY MEDICINE

## 2017-07-25 RX ORDER — SODIUM CHLORIDE 0.9 % (FLUSH) 0.9 %
5-10 SYRINGE (ML) INJECTION EVERY 8 HOURS
Status: DISCONTINUED | OUTPATIENT
Start: 2017-07-25 | End: 2017-07-26 | Stop reason: HOSPADM

## 2017-07-25 RX ORDER — BUTALBITAL, ACETAMINOPHEN AND CAFFEINE 50; 325; 40 MG/1; MG/1; MG/1
1 TABLET ORAL
Qty: 15 TAB | Refills: 0 | Status: SHIPPED | OUTPATIENT
Start: 2017-07-25 | End: 2017-12-05 | Stop reason: ALTCHOICE

## 2017-07-25 RX ORDER — ONDANSETRON 4 MG/1
4 TABLET, ORALLY DISINTEGRATING ORAL
Status: COMPLETED | OUTPATIENT
Start: 2017-07-25 | End: 2017-07-25

## 2017-07-25 RX ORDER — BUTALBITAL, ACETAMINOPHEN AND CAFFEINE 50; 325; 40 MG/1; MG/1; MG/1
1 TABLET ORAL
Status: COMPLETED | OUTPATIENT
Start: 2017-07-25 | End: 2017-07-25

## 2017-07-25 RX ORDER — ONDANSETRON 8 MG/1
8 TABLET, ORALLY DISINTEGRATING ORAL
Qty: 10 TAB | Refills: 0 | Status: SHIPPED | OUTPATIENT
Start: 2017-07-25 | End: 2018-04-05 | Stop reason: ALTCHOICE

## 2017-07-25 RX ORDER — SODIUM CHLORIDE 0.9 % (FLUSH) 0.9 %
5-10 SYRINGE (ML) INJECTION AS NEEDED
Status: DISCONTINUED | OUTPATIENT
Start: 2017-07-25 | End: 2017-07-26 | Stop reason: HOSPADM

## 2017-07-25 RX ADMIN — BUTALBITAL, ACETAMINOPHEN AND CAFFEINE 1 TABLET: 50; 325; 40 TABLET ORAL at 19:38

## 2017-07-25 RX ADMIN — ONDANSETRON 4 MG: 4 TABLET, ORALLY DISINTEGRATING ORAL at 19:38

## 2017-07-25 NOTE — ED TRIAGE NOTES
Pt arrived via EMS with c/o left sided head and jaw pain for 3 days. Pt denies dizziness,nausea,diplopia. Per pt she has no history of migraines. Pt states the light does not affect her headache and he jaw pain is worse when eating. No acute distress. VSS.

## 2017-07-26 LAB
ATRIAL RATE: 85 BPM
CALCULATED P AXIS, ECG09: 18 DEGREES
CALCULATED R AXIS, ECG10: -4 DEGREES
CALCULATED T AXIS, ECG11: 14 DEGREES
DIAGNOSIS, 93000: NORMAL
P-R INTERVAL, ECG05: 164 MS
Q-T INTERVAL, ECG07: 396 MS
QRS DURATION, ECG06: 102 MS
QTC CALCULATION (BEZET), ECG08: 471 MS
VENTRICULAR RATE, ECG03: 85 BPM

## 2017-07-26 NOTE — DISCHARGE INSTRUCTIONS
Headache: Care Instructions  Your Care Instructions    Headaches have many possible causes. Most headaches aren't a sign of a more serious problem, and they will get better on their own. Home treatment may help you feel better faster. The doctor has checked you carefully, but problems can develop later. If you notice any problems or new symptoms, get medical treatment right away. Follow-up care is a key part of your treatment and safety. Be sure to make and go to all appointments, and call your doctor if you are having problems. It's also a good idea to know your test results and keep a list of the medicines you take. How can you care for yourself at home? · Do not drive if you have taken a prescription pain medicine. · Rest in a quiet, dark room until your headache is gone. Close your eyes and try to relax or go to sleep. Don't watch TV or read. · Put a cold, moist cloth or cold pack on the painful area for 10 to 20 minutes at a time. Put a thin cloth between the cold pack and your skin. · Use a warm, moist towel or a heating pad set on low to relax tight shoulder and neck muscles. · Have someone gently massage your neck and shoulders. · Take pain medicines exactly as directed. ¨ If the doctor gave you a prescription medicine for pain, take it as prescribed. ¨ If you are not taking a prescription pain medicine, ask your doctor if you can take an over-the-counter medicine. · Be careful not to take pain medicine more often than the instructions allow, because you may get worse or more frequent headaches when the medicine wears off. · Do not ignore new symptoms that occur with a headache, such as a fever, weakness or numbness, vision changes, or confusion. These may be signs of a more serious problem. To prevent headaches  · Keep a headache diary so you can figure out what triggers your headaches. Avoiding triggers may help you prevent headaches.  Record when each headache began, how long it lasted, and what the pain was like (throbbing, aching, stabbing, or dull). Write down any other symptoms you had with the headache, such as nausea, flashing lights or dark spots, or sensitivity to bright light or loud noise. Note if the headache occurred near your period. List anything that might have triggered the headache, such as certain foods (chocolate, cheese, wine) or odors, smoke, bright light, stress, or lack of sleep. · Find healthy ways to deal with stress. Headaches are most common during or right after stressful times. Take time to relax before and after you do something that has caused a headache in the past.  · Try to keep your muscles relaxed by keeping good posture. Check your jaw, face, neck, and shoulder muscles for tension, and try relaxing them. When sitting at a desk, change positions often, and stretch for 30 seconds each hour. · Get plenty of sleep and exercise. · Eat regularly and well. Long periods without food can trigger a headache. · Treat yourself to a massage. Some people find that regular massages are very helpful in relieving tension. · Limit caffeine by not drinking too much coffee, tea, or soda. But don't quit caffeine suddenly, because that can also give you headaches. · Reduce eyestrain from computers by blinking frequently and looking away from the computer screen every so often. Make sure you have proper eyewear and that your monitor is set up properly, about an arm's length away. · Seek help if you have depression or anxiety. Your headaches may be linked to these conditions. Treatment can both prevent headaches and help with symptoms of anxiety or depression. When should you call for help? Call 911 anytime you think you may need emergency care. For example, call if:  · You have signs of a stroke. These may include:  ¨ Sudden numbness, paralysis, or weakness in your face, arm, or leg, especially on only one side of your body. ¨ Sudden vision changes.   ¨ Sudden trouble speaking. ¨ Sudden confusion or trouble understanding simple statements. ¨ Sudden problems with walking or balance. ¨ A sudden, severe headache that is different from past headaches. Call your doctor now or seek immediate medical care if:  · You have a new or worse headache. · Your headache gets much worse. Where can you learn more? Go to http://luigi-atif.info/. Enter M271 in the search box to learn more about \"Headache: Care Instructions. \"  Current as of: 2016  Content Version: 11.3  © 7227-5216 Collections. Care instructions adapted under license by [employer] (which disclaims liability or warranty for this information). If you have questions about a medical condition or this instruction, always ask your healthcare professional. [address] any warranty or liability for your use of this information. MyChart Activation    Thank you for requesting access to [employer]. Please follow the instructions below to securely access and download your online medical record. [employer] allows you to send messages to your doctor, view your test results, renew your prescriptions, schedule appointments, and more. How Do I Sign Up? 1. In your internet browser, go to [email or web address]  2. Click on the First Time User? Click Here link in the Sign In box. You will be redirect to the New Member Sign Up page. 3. Enter your [employer] Access Code exactly as it appears below. You will not need to use this code after youve completed the sign-up process. If you do not sign up before the expiration date, you must request a new code. [employer] Access Code: [email or web address]  Expires: 10/23/2017  9:45 PM (This is the date your [employer] access code will )    4. Enter the last four digits of your Social Security Number (xxxx) and Date of Birth (mm/dd/yyyy) as indicated and click Submit. You will be taken to the next sign-up page.   5. Create a [employer] Kitchen Hotlinehart ID. This will be your [employer] login ID and cannot be changed, so think of one that is secure and easy to remember. 6. Create a [employer] password. You can change your password at any time. 7. Enter your Password Reset Question and Answer. This can be used at a later time if you forget your password. 8. Enter your e-mail address. You will receive e-mail notification when new information is available in [address] 9. Click Sign Up. You can now view and download portions of your medical record. 10. Click the Download Summary menu link to download a portable copy of your medical information. Additional Information    If you have questions, please visit the Frequently Asked Questions section of the [employer] website at [email or web address] Remember, [employer] is NOT to be used for urgent needs. For medical emergencies, dial 911.

## 2017-07-26 NOTE — ED NOTES
Bedside reports received from Antolin Martinez, [facility]. Pt resting on stretcher and updated on status--waiting evaluation from Dr. Maximino Quintanilla. Pt denies needs at this time.

## 2017-07-26 NOTE — ED PROVIDER NOTES
HPI Comments: Boom Villegas is a 64 y.o. female with PMHx of bell's palsy, fibromyalgia, migraine, CAD, congenital lazy eye, and chronic HA, presenting per EMS to ED c/o intermittent gradual onset aching left-sided facial/head pain for the past 1-2 days, worse since today. Pt reports she took tylenol without relief. She notes associated rhinorrhea, nausea, and \"my stomach feels stuffy\", but denies abdominal pain. Pt reports history of \"slight\" HA's, which typically resolve after taking tylenol. She denies history of migraines. Pt denies FHx of brain aneurysm. She notes history of congenital lazy eye. Pt denies blindness. She denies history of stroke, glaucoma, shingles, fall/injury/trauma, taking blood thinners, kidney disease, dental pain, or cardiac disease. Pt specifically denies neck pain, rash, weakness, CP, abdominal pain, or vision changes. PCP: Blessing Vizcaino DO  Social Hx: former smoker (quit date: 1/1/1962); - EtOH; - drug use. There are no other complaints, changes, or physical findings at this time. Written by REZA Montalvoibgetachew, as dictated by Dee Ghosh MD.           The history is provided by the patient and the EMS personnel. Past Medical History:   Diagnosis Date    Abnormal cardiovascular stress test 04/18/08    Dr. Barbara Moeller    Allergy, unspecified not elsewhere classified     Bell's palsy 02/2005    Left. Dr. Tracy Martinez CAD (coronary artery disease)     Dr. Martha Rowell. Dr. Klever Garcia Chest pain 2002    Dr. Elliot Keita Chronic headache 2013    due to depression. Dr. Jyoti Doll.  Chronic pain     LEGS AND FEET; BACK UNDER RIGHT SHOULDER    Congenital eye disorder     Lazy eyes. Dr. Priscila Yoon.  Depression 2011    Dr. Damon Keen (psychologist). Dr. Jyoti Doll. Dr. Sauer .  DJD (degenerative joint disease)     knees, low back. Dr. Rochelle Nichols. Dr. Karin Klein. Dr. Huong Zaman Gait instability 02/10/09    Dr. Mg Ojeda. Dr. Darrell Watt.  Gallstones     GERD (gastroesophageal reflux disease)     Hiatal hernia 1977    Hypercholesteremia     Hypertension     Ill-defined condition     \"JUST ONE NIGHT I WENT TO STAND UP AND I JUST COULDN'T STAND\"  PT NOT SURE WHY    Leg weakness 12/2009    Dr. Ewa Cid. Dr. Brant Carter.  Lower extremity venous stasis     Dr. Rola Boyle    Lumbar spinal stenosis 12/2009    L3-5. Dr. Marii Nino. PARAPLEGIA SINCE 2012    Lymphedema 12/19/08    Dr. Jayda Walker then Dr. Kristie Lopez    Memory loss     Menopause     Migraine     Ocular. Dr. Flora Sheppard    NORMA (obstructive sleep apnea) 01/24/07    Dr. Monica Diggs. NO CPAP    Osteoporosis 02/10/09    Dr. En Ventura    Pancreatitis 03/2011    Post-menopausal bleeding 07/2016    due to Dr. Yelena Robbins Jr.    Rotator cuff injury     Left. Dr. Earnestine Medellin; chronic bilateral    Seborrheic keratosis 2005    Dr. Ada Orr 02/05/09    Dr. Leanna Caruso   Kerline Gunning Tremor 2013    Dr. Darrell Watt.  Unspecified vitamin D deficiency 07/2009    Urinary incontinence 2013    Dr. Joaquin Morrison Venous insufficiency 1998    R>L. with chronic leg edema. Dr. Kristie Lopez       Past Surgical History:   Procedure Laterality Date    HX HEART CATHETERIZATION  06/2008    Dr. Michael Bullock. due to abnormal Stress Test    HX MAR AND BSO  09/14/2016    Reza Benito HYSTEROSCOPIC MYOMECTOMY  07/27/2016    D AND C WITH SYMPHION, RESECTION OF ENDOMETRIAL TISSUE. benign. due to postmenopausal bleeding. Dr. Justina Bradford.          Family History:   Problem Relation Age of Onset    Hypertension Mother     Arthritis-osteo Mother     Dementia Mother     Cancer Father      liver    Heart Attack Father     Other Father      kidney stones    Stroke Maternal Grandmother     Hypertension Sister     Arthritis-osteo Sister     Diabetes Paternal Aunt     Anesth Problems Neg Hx        Social History     Social History    Marital status: SINGLE     Spouse name: N/A    Number of children: N/A    Years of education: N/A     Occupational History    Not on file. Social History Main Topics    Smoking status: Former Smoker     Packs/day: 0.25     Years: 0.50     Types: Cigarettes     Quit date: 1/1/1962    Smokeless tobacco: Never Used      Comment: Pt smoked 3-4 cigs x 9 mos. Pt lived with smoker dad x 40 yrs    Alcohol use No    Drug use: No    Sexual activity: Yes     Partners: Male     Birth control/ protection: Condom     Other Topics Concern    Not on file     Social History Narrative         ALLERGIES: Aristocort [triamcinolone]; Codeine; Lasix [furosemide]; Bupropion; Darvocet a500 [propoxyphene n-acetaminophen]; Influenza virus vaccine whole; Mevacor [lovastatin]; Percocet [oxycodone-acetaminophen]; Rhinocort; and Simvastatin    Review of Systems   Constitutional: Negative. Negative for chills and fever. HENT: Positive for rhinorrhea. Negative for congestion. Eyes: Negative for visual disturbance. Respiratory: Negative. Negative for cough, chest tightness and wheezing. Cardiovascular: Negative. Negative for chest pain and palpitations. Gastrointestinal: Positive for nausea. Negative for abdominal pain, constipation and vomiting.        + \"stomach feels stuffy\"; Endocrine: Negative. Genitourinary: Negative. Negative for decreased urine volume, flank pain, hematuria and pelvic pain. Musculoskeletal: Negative. Negative for back pain and neck pain. Skin: Negative. Negative for color change, pallor and rash. Neurological: Negative for dizziness, seizures, weakness and numbness. + left-sided facial/head pain;    Hematological: Negative. Negative for adenopathy. Psychiatric/Behavioral: Negative.     All other systems reviewed and are negative. Vitals:    07/25/17 1905 07/25/17 1911   BP: 125/56 125/71   Pulse: 88    Resp: 18    Temp: 98.6 °F (37 °C)    SpO2: 97% 94%   Weight: 123.8 kg (273 lb)    Height: 5' 1\" (1.549 m)             Physical Exam   Constitutional: She is oriented to person, place, and time. She appears well-developed and well-nourished. No distress. HENT:   Head: Normocephalic and atraumatic. Head is without raccoon's eyes and without Martin's sign. Right Ear: Tympanic membrane normal. No hemotympanum. Left Ear: Tympanic membrane normal. No hemotympanum. Nose: Rhinorrhea present. Left sinus exhibits maxillary sinus tenderness and frontal sinus tenderness. Mouth/Throat: No oropharyngeal exudate. mild left temporal artery pain   Eyes: Conjunctivae are normal. Pupils are equal, round, and reactive to light. Right eye exhibits no discharge. Left eye exhibits no discharge. No scleral icterus. Right eye exhibits no nystagmus. Left eye exhibits no nystagmus. Baseline left eye strabissmus   Neck: Trachea normal and normal range of motion. Neck supple. No JVD present. No spinous process tenderness and no muscular tenderness present. Carotid bruit is not present. No erythema present. No Brudzinski's sign and no Kernig's sign noted. Cardiovascular: Normal rate, regular rhythm, normal heart sounds and intact distal pulses. Exam reveals no gallop and no friction rub. No murmur heard. Pulmonary/Chest: Effort normal and breath sounds normal. No stridor. No respiratory distress. She has no wheezes. She has no rales. She exhibits no tenderness. Abdominal: Soft. Bowel sounds are normal. She exhibits no distension and no mass. There is no tenderness. There is no rebound and no guarding. Neurological: She is alert and oriented to person, place, and time. She has normal strength and normal reflexes. She displays normal reflexes. No cranial nerve deficit or sensory deficit. She exhibits normal muscle tone. Coordination normal. GCS eye subscore is 4. GCS verbal subscore is 5. GCS motor subscore is 6. Skin: Skin is warm. No rash noted. She is not diaphoretic. No pallor. Vitals reviewed. MDM  Number of Diagnoses or Management Options  Nonintractable headache, unspecified chronicity pattern, unspecified headache type:   Diagnosis management comments: DDx: temporal arteritis, polymyalgiarheumatica, verterbral artery dissection, trigeminal neuralgia, sinus HA, ICH, cervicogenic HA, shingles. Impression plan: Pt presents with progressive left-sided facial pain without severe neck pain or neurological deficits. Subtle pain here in her left temporal artery but no visual symptoms. Will obtain labs, CT, and make final disposition pending those results and response to treatment. Amount and/or Complexity of Data Reviewed  Clinical lab tests: ordered and reviewed  Tests in the radiology section of CPT®: ordered and reviewed  Tests in the medicine section of CPT®: ordered and reviewed  Obtain history from someone other than the patient: yes (EMS)  Review and summarize past medical records: yes  Independent visualization of images, tracings, or specimens: yes    Risk of Complications, Morbidity, and/or Mortality  Presenting problems: moderate  Diagnostic procedures: moderate  Management options: moderate    Patient Progress  Patient progress: stable    Procedures    EKG interpretation: (Preliminary) 1946  Rhythm: sinus rhythm with PAC's; and regular . Rate (approx.): 85; Axis: normal; MD interval: normal; QRS interval: normal ; ST/T wave: normal; Other findings: minimal voltage criteria for LVH, may be normal variant. Written by REZA Acevedo, as dictated by Kym Ferrer MD.     Progress Note:  9:44 PM  Pt re-evaluated. She reports her pain has improved.    Written by REZA Acevedo, as dictated by Kym Ferrer MD.     LABORATORY TESTS:  Recent Results (from the past 12 hour(s))   EKG, 12 LEAD, INITIAL    Collection Time: 07/25/17  7:46 PM   Result Value Ref Range    Ventricular Rate 85 BPM    Atrial Rate 85 BPM    P-R Interval 164 ms    QRS Duration 102 ms    Q-T Interval 396 ms    QTC Calculation (Bezet) 471 ms    Calculated P Axis 18 degrees    Calculated R Axis -4 degrees    Calculated T Axis 14 degrees    Diagnosis       Sinus rhythm with premature atrial complexes  Minimal voltage criteria for LVH, may be normal variant  When compared with ECG of 22-JUL-2016 14:30,  premature atrial complexes are now present     CBC WITH AUTOMATED DIFF    Collection Time: 07/25/17  8:00 PM   Result Value Ref Range    WBC 6.8 3.6 - 11.0 K/uL    RBC 4.35 3.80 - 5.20 M/uL    HGB 12.5 11.5 - 16.0 g/dL    HCT 40.1 35.0 - 47.0 %    MCV 92.2 80.0 - 99.0 FL    MCH 28.7 26.0 - 34.0 PG    MCHC 31.2 30.0 - 36.5 g/dL    RDW 14.9 (H) 11.5 - 14.5 %    PLATELET 666 638 - 303 K/uL    NEUTROPHILS 58 32 - 75 %    LYMPHOCYTES 32 12 - 49 %    MONOCYTES 6 5 - 13 %    EOSINOPHILS 4 0 - 7 %    BASOPHILS 0 0 - 1 %    ABS. NEUTROPHILS 4.0 1.8 - 8.0 K/UL    ABS. LYMPHOCYTES 2.2 0.8 - 3.5 K/UL    ABS. MONOCYTES 0.4 0.0 - 1.0 K/UL    ABS. EOSINOPHILS 0.2 0.0 - 0.4 K/UL    ABS. BASOPHILS 0.0 0.0 - 0.1 K/UL   METABOLIC PANEL, COMPREHENSIVE    Collection Time: 07/25/17  8:00 PM   Result Value Ref Range    Sodium 139 136 - 145 mmol/L    Potassium 3.3 (L) 3.5 - 5.1 mmol/L    Chloride 101 97 - 108 mmol/L    CO2 34 (H) 21 - 32 mmol/L    Anion gap 4 (L) 5 - 15 mmol/L    Glucose 120 (H) 65 - 100 mg/dL    BUN 9 6 - 20 MG/DL    Creatinine 0.96 0.55 - 1.02 MG/DL    BUN/Creatinine ratio 9 (L) 12 - 20      GFR est AA >60 >60 ml/min/1.73m2    GFR est non-AA 56 (L) >60 ml/min/1.73m2    Calcium 8.3 (L) 8.5 - 10.1 MG/DL    Bilirubin, total 0.2 0.2 - 1.0 MG/DL    ALT (SGPT) 14 12 - 78 U/L    AST (SGOT) 11 (L) 15 - 37 U/L    Alk.  phosphatase 90 45 - 117 U/L    Protein, total 7.2 6.4 - 8.2 g/dL    Albumin 3.3 (L) 3.5 - 5.0 g/dL    Globulin 3.9 2.0 - 4.0 g/dL    A-G Ratio 0.8 (L) 1.1 - 2.2     SED RATE (ESR)    Collection Time: 07/25/17  8:00 PM   Result Value Ref Range    Sed rate, automated 24 0 - 30 mm/hr   URINALYSIS W/ REFLEX CULTURE    Collection Time: 07/25/17  9:06 PM   Result Value Ref Range    Color YELLOW/STRAW      Appearance CLOUDY (A) CLEAR      Specific gravity 1.015 1.003 - 1.030      pH (UA) 7.0 5.0 - 8.0      Protein NEGATIVE  NEG mg/dL    Glucose NEGATIVE  NEG mg/dL    Ketone NEGATIVE  NEG mg/dL    Bilirubin NEGATIVE  NEG      Blood NEGATIVE  NEG      Urobilinogen 1.0 0.2 - 1.0 EU/dL    Nitrites NEGATIVE  NEG      Leukocyte Esterase NEGATIVE  NEG      WBC PENDING /hpf    RBC PENDING /hpf    Epithelial cells PENDING /lpf    Bacteria PENDING /hpf    UA:UC IF INDICATED PENDING        IMAGING RESULTS:  EXAM:  CT HEAD WO CONT     INDICATION:   left face and head pain     COMPARISON: 2013.     CONTRAST:  None.     TECHNIQUE: Unenhanced CT of the head was performed using 5 mm images. Brain and  bone windows were generated. CT dose reduction was achieved through use of a  standardized protocol tailored for this examination and automatic exposure  control for dose modulation.       FINDINGS:  The ventricles and sulci are normal in size, shape and configuration and  midline. There is no significant white matter disease. There is no intracranial  hemorrhage, extra-axial collection, mass, mass effect or midline shift. The  basilar cisterns are open. No acute infarct is identified. The bone windows  demonstrate no abnormalities. The visualized portions of the paranasal sinuses  and mastoid air cells are clear.     IMPRESSION  IMPRESSION: Unremarkable CT of the head               EXAM:  XR CHEST PORT     INDICATION:  headache     COMPARISON:  9/21/2015     FINDINGS:     A portable AP radiograph of the chest was obtained at 19:55 hours. There is mild  linear atelectasis or scarring in the right midlung. The lungs are otherwise  clear.   There is mild cardiomegaly with left ventricular prominence and an  uncoiled, atherosclerotic aorta. There is no vascular congestion or pleural  fluid. Degenerative changes are present in the shoulders.        IMPRESSION  IMPRESSION:      Mild linear atelectasis or scarring in the right midlung. Cardiomegaly without  CHF.              MEDICATIONS GIVEN:  Medications   sodium chloride (NS) flush 5-10 mL (not administered)   sodium chloride (NS) flush 5-10 mL (not administered)   butalbital-acetaminophen-caffeine (FIORICET, ESGIC) -40 mg per tablet 1 Tab (1 Tab Oral Given 17)   ondansetron (ZOFRAN ODT) tablet 4 mg (4 mg Oral Given 17)       IMPRESSION:  1. Nonintractable headache, unspecified chronicity pattern, unspecified headache type        PLAN:  1. Current Discharge Medication List      START taking these medications    Details   butalbital-acetaminophen-caffeine (ESGIC) -40 mg per tablet Take 1 Tab by mouth every six (6) hours as needed for Pain. Max Daily Amount: 4 Tabs. Qty: 15 Tab, Refills: 0         CONTINUE these medications which have CHANGED    Details   ondansetron (ZOFRAN ODT) 8 mg disintegrating tablet Take 1 Tab by mouth every eight (8) hours as needed for Nausea.   Qty: 10 Tab, Refills: 0         STOP taking these medications       HYDROcodone-acetaminophen (NORCO) 7.5-325 mg per tablet Comments:   Reason for Stopping:         pantoprazole (PROTONIX) 40 mg tablet Comments:   Reason for Stoppin.   Follow-up Information     Follow up With Details Comments 13 [facility]  Call in 1 day  [address]      Merlin Ken MD Call in 2 days  [address]   MOB [address] [facility]  [phone]      [facility] EMERGENCY DEPT  If symptoms worsen [address]  [address] [facility]  [phone]        Return to ED if worse     DISCHARGE NOTE  9:50 PM  The patient has been re-evaluated and is ready for discharge. Reviewed available results with patient. Counseled pt on diagnosis and care plan. Pt has expressed understanding, and all questions have been answered. Pt agrees with plan and agrees to F/U as recommended, or return to the ED if their sxs worsen. Discharge instructions have been provided and explained to the pt, along with reasons to return to the ED. Written by Rakan Wells, ED Scribe, as dictated by Kassie Rodriges MD.    This note is prepared by Rakan Wells, acting as Scribe for Kassie Rodriges MD.    Kassie Rodriges MD: The scribe's documentation has been prepared under my direction and personally reviewed by me in its entirety. I confirm that the note above accurately reflects all work, treatment, procedures, and medical decision making performed by me.

## 2017-07-28 LAB
BACTERIA SPEC CULT: ABNORMAL
CC UR VC: ABNORMAL
SERVICE CMNT-IMP: ABNORMAL

## 2017-07-28 RX ORDER — CEPHALEXIN 500 MG/1
500 CAPSULE ORAL 2 TIMES DAILY
Qty: 14 CAP | Refills: 0 | Status: SHIPPED | OUTPATIENT
Start: 2017-07-28 | End: 2017-07-28

## 2017-07-28 RX ORDER — CEPHALEXIN 500 MG/1
500 CAPSULE ORAL 2 TIMES DAILY
Qty: 14 CAP | Refills: 0 | Status: SHIPPED | OUTPATIENT
Start: 2017-07-28 | End: 2017-08-04

## 2017-07-28 NOTE — PROGRESS NOTES
Contacted patient, wants abx sent to pharmacy on file and ordered faxed to [phone]. Sent Keflex.    Ruby Sims

## 2017-07-28 NOTE — PROGRESS NOTES
Contacted patient, verified . Discussed culture results. Patient is unsure of pharmacy to send abx, would like provider to call her back at 3pm to speak with a family member to discuss where to send abx.    Marcello Roman, [address]

## 2017-08-03 ENCOUNTER — TELEPHONE (OUTPATIENT)
Dept: FAMILY MEDICINE CLINIC | Age: 77
End: 2017-08-03

## 2017-08-03 ENCOUNTER — OFFICE VISIT (OUTPATIENT)
Dept: FAMILY MEDICINE CLINIC | Age: 77
End: 2017-08-03

## 2017-08-03 VITALS
OXYGEN SATURATION: 95 % | RESPIRATION RATE: 16 BRPM | TEMPERATURE: 97.8 F | HEIGHT: 61 IN | WEIGHT: 273 LBS | SYSTOLIC BLOOD PRESSURE: 97 MMHG | DIASTOLIC BLOOD PRESSURE: 59 MMHG | HEART RATE: 75 BPM | BODY MASS INDEX: 51.54 KG/M2

## 2017-08-03 DIAGNOSIS — M25.562 BILATERAL CHRONIC KNEE PAIN: ICD-10-CM

## 2017-08-03 DIAGNOSIS — G89.29 BILATERAL CHRONIC KNEE PAIN: ICD-10-CM

## 2017-08-03 DIAGNOSIS — N39.0 ACUTE UTI: ICD-10-CM

## 2017-08-03 DIAGNOSIS — M81.0 AGE-RELATED OSTEOPOROSIS WITHOUT CURRENT PATHOLOGICAL FRACTURE: ICD-10-CM

## 2017-08-03 DIAGNOSIS — Z13.820 OSTEOPOROSIS SCREENING: ICD-10-CM

## 2017-08-03 DIAGNOSIS — R73.9 HYPERGLYCEMIA: ICD-10-CM

## 2017-08-03 DIAGNOSIS — J30.89 NON-SEASONAL ALLERGIC RHINITIS DUE TO OTHER ALLERGIC TRIGGER: ICD-10-CM

## 2017-08-03 DIAGNOSIS — R51.9 LEFT TEMPORAL HEADACHE: ICD-10-CM

## 2017-08-03 DIAGNOSIS — Z00.00 MEDICARE ANNUAL WELLNESS VISIT, INITIAL: Primary | ICD-10-CM

## 2017-08-03 DIAGNOSIS — H25.13 AGE-RELATED NUCLEAR CATARACT OF BOTH EYES: ICD-10-CM

## 2017-08-03 DIAGNOSIS — I10 ESSENTIAL HYPERTENSION: ICD-10-CM

## 2017-08-03 DIAGNOSIS — R63.5 WEIGHT GAIN: ICD-10-CM

## 2017-08-03 DIAGNOSIS — R92.8 ABNORMAL MAMMOGRAM OF LEFT BREAST: ICD-10-CM

## 2017-08-03 DIAGNOSIS — I87.8 LOWER EXTREMITY VENOUS STASIS: ICD-10-CM

## 2017-08-03 DIAGNOSIS — E55.9 VITAMIN D DEFICIENCY: ICD-10-CM

## 2017-08-03 DIAGNOSIS — H50.00 ESOTROPIA: ICD-10-CM

## 2017-08-03 DIAGNOSIS — G30.1 DEMENTIA IN ALZHEIMER'S DISEASE WITH LATE ONSET: Chronic | ICD-10-CM

## 2017-08-03 DIAGNOSIS — F02.80 DEMENTIA IN ALZHEIMER'S DISEASE WITH LATE ONSET: Chronic | ICD-10-CM

## 2017-08-03 DIAGNOSIS — R25.1 TREMOR: ICD-10-CM

## 2017-08-03 DIAGNOSIS — F41.8 DEPRESSION WITH ANXIETY: ICD-10-CM

## 2017-08-03 DIAGNOSIS — I25.10 CORONARY ARTERY DISEASE INVOLVING NATIVE CORONARY ARTERY OF NATIVE HEART WITHOUT ANGINA PECTORIS: ICD-10-CM

## 2017-08-03 DIAGNOSIS — E87.6 HYPOKALEMIA: ICD-10-CM

## 2017-08-03 DIAGNOSIS — M48.061 LUMBAR SPINAL STENOSIS: ICD-10-CM

## 2017-08-03 DIAGNOSIS — E78.00 HYPERCHOLESTEREMIA: ICD-10-CM

## 2017-08-03 DIAGNOSIS — M25.561 BILATERAL CHRONIC KNEE PAIN: ICD-10-CM

## 2017-08-03 DIAGNOSIS — Z13.39 ALCOHOL SCREENING: ICD-10-CM

## 2017-08-03 DIAGNOSIS — R26.81 GAIT INSTABILITY: ICD-10-CM

## 2017-08-03 DIAGNOSIS — Z78.9 STATIN INTOLERANCE: ICD-10-CM

## 2017-08-03 DIAGNOSIS — R29.898 WEAKNESS OF BOTH LOWER EXTREMITIES: ICD-10-CM

## 2017-08-03 DIAGNOSIS — Z13.31 DEPRESSION SCREENING: ICD-10-CM

## 2017-08-03 RX ORDER — GUAIFENESIN 100 MG/5ML
81 LIQUID (ML) ORAL DAILY
Qty: 90 TAB | Refills: 3
Start: 2017-08-03

## 2017-08-03 NOTE — PROGRESS NOTES
Chief Complaint   Patient presents with   [address] [place] [address] Annual Wellness Visit    Referral Request     neurology   [address] [place] [address] ED Follow-up     7/25/17 [id number] (note in encounters)     Headache     \"comes and goes\"; pain average is about a 7/10, but varies with each HA; dull and achy pain.  Arm swelling     right arm; does not know why it is swollen; just started to hurt \"a little\" was not hurting at first    Leg Swelling     bilateral legs; has stockings on today; pt stated that they took her wraps off a last week, \"the lady was supposed to come back on tuesday to see how my legs were doing with out the wraps, and she never came back. \"     1. Have you been to the ER, urgent care clinic since your last visit? Hospitalized since your last visit? Yes, pt went to ER on 07/25/2017 for non-retracable HA, unspecified chronic pattern, and unspecified HA type; note is in encounters section. 2. Have you seen or consulted any other health care providers outside of the [address] [place] [address] since your last visit? Include any pap smears or colon screening.  No Pt notified that the written RX is ready at the [facility]  registration to be picked up.

## 2017-08-03 NOTE — PROGRESS NOTES
Date of visit: 8/3/2017       This is an Initial to [facility] Wellness Visit. This initial visit is performed after the first 12 months of effective date of Medicare Part B enrollment and patient has not had a Medicare Annual Wellness visit yet. Initial visit is only performed once. Peace Ackerman is a 64 y.o. female   History obtained from: the patient. Lives at facility: [address] [place] [address]    History     Patient Active Problem List   Diagnosis Code    CAD (coronary artery disease) I25.10    Lymphedema I89.0    Hypercholesteremia E78.00    GERD (gastroesophageal reflux disease) K21.9    Allergy, unspecified not elsewhere classified T78.40XA    Osteoporosis M81.0    Gait instability R26.81    Lumbar spinal stenosis M48.06    Leg weakness R29.898    Venous insufficiency I87.2    Lower extremity venous stasis I87.8    Hypokalemia E87.6    Morbid obesity (HCC) E66.01    Depression with anxiety F41.8    Dementia in Alzheimer's disease with late onset G30.1, F02.80    Tremor R25.1    Chronic headache R51    Shin splint H44.362S    Vitamin D deficiency E55.9    Hyperglycemia R73.9    Essential hypertension I10    Candidiasis of breast B37.89    Esotropia H50.00    Cataract H26.9     Past Medical History:   Diagnosis Date    Abnormal cardiovascular stress test 04/18/08    Dr. Zachary Angeles    Allergy, unspecified not elsewhere classified     Bell's palsy 02/2005    Left. Dr. Daniel Oliveros CAD (coronary artery disease)     Dr. Alice Castellanos. Dr. Amol Dotson Chest pain 2002    Dr. Jason Martins Chronic headache 2013    due to depression. Dr. Anya Valdes.  Chronic pain     LEGS AND FEET; BACK UNDER RIGHT SHOULDER    Congenital eye disorder     Lazy eyes. Dr. Merlin Baxter.  Depression 2011    Dr. Mckenna Jordan (psychologist). Dr. Anya Valdes. Dr. Saqib Macario.     DJD (degenerative joint disease) knees, low back. Dr. Nola Hernandez. Dr. Barbara Lopez. Dr. Brenden Main Gait instability 02/10/09    Dr. Clyde Zimmerman. Dr. Lavon Leal.  Gallstones     GERD (gastroesophageal reflux disease)     Hiatal hernia 1977    Hypercholesteremia     Hypertension     Ill-defined condition     \"JUST ONE NIGHT I WENT TO STAND UP AND I JUST COULDN'T STAND\"  PT NOT SURE WHY    Leg weakness 12/2009    Dr. Alcides Reardon. Dr. Naomy Richards.  Lower extremity venous stasis     Dr. Rohan Mario    Lumbar spinal stenosis 12/2009    L3-5. Dr. Pasha Sim. PARAPLEGIA SINCE 2012    Lymphedema 12/19/08    Dr. Amina Frias then Dr. Sera Wong    Memory loss     Menopause     Migraine     Ocular. Dr. Harmeet Jenkins    NORMA (obstructive sleep apnea) 01/24/07    Dr. Janeen Bolden. NO CPAP    Osteoporosis 02/10/09    Dr. Vertis Brunner    Pancreatitis 03/2011    Post-menopausal bleeding 07/2016    due to Dr. Kendall Barnes Jr.    Rotator cuff injury     Left. Dr. Carlos Ernst; chronic bilateral    Seborrheic keratosis 2005    Dr. Hany Carolina 02/05/09    Dr. Child Records   [place] Jose Tremor 2013    Dr. Lavon Leal.  Unspecified vitamin D deficiency 07/2009    Urinary incontinence 2013    Dr. Leila Grant Venous insufficiency 1998    R>L. with chronic leg edema. Dr. Sera Wong      Past Surgical History:   Procedure Laterality Date    HX HEART CATHETERIZATION  06/2008    Dr. Armando Malin. due to abnormal Stress Test    HX MAR AND BSO  09/14/2016    Dr. Maria Fernanda Zhao, [facility] HYSTEROSCOPIC MYOMECTOMY  07/27/2016    D AND C WITH SYMPHION, RESECTION OF ENDOMETRIAL TISSUE. benign. due to postmenopausal bleeding. Dr. Ephraim Lau.      Allergies   Allergen Reactions    Aristocort [Triamcinolone] Hives     Aristocort shot    Codeine Itching    Lasix [Furosemide] Itching    Bupropion Other (comments)     Severe headache    Darvocet A500 [Propoxyphene N-Acetaminophen] Other (comments)     headache    Influenza Virus Vaccine Whole Other (comments)     pharyngitis    Mevacor [Lovastatin] Other (comments)     myalgias    Percocet [Oxycodone-Acetaminophen] Itching     Worse headache    Rhinocort Other (comments)     nosebleed    Simvastatin Other (comments)     myalgias     Current Outpatient Prescriptions   Medication Sig Dispense Refill    cephALEXin (KEFLEX) 500 mg capsule Take 1 Cap by mouth two (2) times a day for 7 days. 14 Cap 0    ondansetron (ZOFRAN ODT) 8 mg disintegrating tablet Take 1 Tab by mouth every eight (8) hours as needed for Nausea. 10 Tab 0    butalbital-acetaminophen-caffeine (ESGIC) -40 mg per tablet Take 1 Tab by mouth every six (6) hours as needed for Pain. Max Daily Amount: 4 Tabs. 15 Tab 0    albuterol (PROVENTIL HFA, VENTOLIN HFA, PROAIR HFA) 90 mcg/actuation inhaler Take 2 Puffs by inhalation every six (6) hours as needed for Wheezing or Shortness of Breath. Indications: BRONCHOSPASM PREVENTION 1 Inhaler 1    guaiFENesin-dextromethorphan (SILTUSSIN DM CARMONA)  mg/5 mL liqd Take 10 mL by mouth every four (4) hours as needed. Indications: COLD SYMPTOMS 236 mL 0    MAPAP 325 mg tablet TAKE 2 TABLETS BY MOUTH EVERY 6-8 HOURS AS NEEDED FOR PAIN 90 Tab 0    mirtazapine (REMERON SOL-TAB) 15 mg disintegrating tablet Take 15 mg by mouth nightly.  fluticasone (FLONASE) 50 mcg/actuation nasal spray 2 Sprays by Both Nostrils route daily. Indications: ALLERGIC RHINITIS 1 Bottle 6    OTHER Please dispense an Ultra light Wheelchair DX.severe osteoarthritis, fibromyalgia, Bell's Palsy, DJD,muscle weakness, lumbar stenosis, knee and shoulder pain 1 Device 0    amlodipine (NORVASC) 2.5 mg tablet Take 1 tablet by mouth daily. 30 tablet 11    aspirin 81 mg chewable tablet Take 1 tablet by mouth daily.  30 tablet 11    bumetanide (BUMEX) 1 mg tablet TAKE 1 TABLET BY MOUTH EVERY DAY 30 tablet 11    potassium chloride SR (KLOR-CON 10) 10 mEq tablet TAKE 1 TABLET BY MOUTH EVERY DAY 30 tablet 11    lisinopril (PRINIVIL, ZESTRIL) 5 mg tablet Take 1 tablet by mouth daily. 30 tablet 11    loratadine (CLARITIN) 10 mg tablet Take 1 Tab by mouth daily. Indications: ALLERGIC RHINITIS 30 Tab 11    montelukast (SINGULAIR) 10 mg tablet Take 1 Tab by mouth daily. Indications: ALLERGIC RHINITIS 30 Tab 11    nystatin (MYCOSTATIN) 100,000 unit/gram ointment Apply  to affected area two (2) times a day. 15 g 5    cholecalciferol (VITAMIN D3) 1,000 unit tablet Take 1 tablet by mouth daily. 30 tablet 11     Family History   Problem Relation Age of Onset    Hypertension Mother     Arthritis-osteo Mother     Dementia Mother     Cancer Father      liver    Heart Attack Father     Other Father      kidney stones    Stroke Maternal Grandmother     Hypertension Sister     Arthritis-osteo Sister     Diabetes Paternal Aunt     Anesth Problems Neg Hx      Social History   Substance Use Topics    Smoking status: Former Smoker     Packs/day: 0.25     Years: 0.50     Types: Cigarettes     Quit date: 1/1/1962    Smokeless tobacco: Never Used      Comment: Pt smoked 3-4 cigs x 9 mos. Pt lived with smoker dad x 40 yrs    Alcohol use No       Specialists/Care Team   Jada Zhouantony Simmonsarnulfo has established care with the following healthcare providers:  Patient Care Team:  Gabriel Gonsales DO as PCP - Fide Burdick RN as Ambulatory Care Navigator (Family Practice)  Sudhakar Hernandez MD (Psychiatry)  Manoj Salmeron MD (Neurology)  Nicole Dean MD (Cardiology)  Ruslan Ellison MD (Obstetrics & Gynecology)  Ashlee Fernandez OD (Optometry)      Health Risk Assessment     Demographics   female  64 y.o. General Health Questions   -During the past 4 weeks:   How would you rate your health in general? Good  How often have you been bothered by feeling dizzy when standing up? never  How much have you been bothered by bodily pain? moderate  Have you noticed any hearing difficulties? no  Has your physical and emotional health limited your social activities with family or friends? Yes - not being able to get out independently    Emotional Health Questions     PHQ over the last two weeks 8/3/2017   Little interest or pleasure in doing things Not at all   Feeling down, depressed or hopeless Not at all   Total Score PHQ 2 0     Health Habits   Please describe your diet habits: overall healthy  Do you get 5 servings of fruits or vegetables daily? Yes - more veggies, not so much fruit, but does have glass of juice in am  Do you exercise regularly? Yes - activities daily at facility   Do you smoke? no    Alcohol Risk Factor Screening:   Do you drink alcohol? no      Activities of Daily Living and Functional Status   Do you need help with eating, walking, dressing, bathing, toileting, the phone, transportation, shopping, preparing meals, housework, laundry, or medications:  Yes - family in town (siblings, nephews and nieces) and facility   -Do you need help managing money? no  -In the past four weeks, was someone available to help you if you needed and wanted help with anything? yes  -Are you confident are you that you can control and manage most of your health problems? yes  -Have you been given information to help you keep track of your medications? Yes - facility gives meds and keeps track of them  -How often do you have trouble taking your medications as prescribed? never    Hearing Loss   Have you noticed any hearing difficulties? no    Fall Risk and Home Safety   Have you fallen 2 or more times in the past year? no  Does your home have rugs in the hallway, lack grab bars in the bathroom, lack handrails on the stairs or have poor lighting? no  Do you have smoke detectors and check them regularly? yes  Do you have difficulties driving a car?  Doesn't drive  Do you always fasten your seat belt when you are in a car? yes  Fall Risk Assessment:    Fall Risk Assessment, last 12 mths 8/3/2017   Able to walk? No       Abuse Screen   Patient is not abused    Evaluation of Cognitive Function   Mood/affect:  happy  Orientation: Person, Place, Time and Situation  Appearance: age appropriate  Family member/caregiver input: none    Physical Examination     Vitals:    08/03/17 1102   BP: 97/59   Pulse: 75   Resp: 16   Temp: 97.8 °F (36.6 °C)   TempSrc: Oral   SpO2: 95%   Weight: 273 lb (123.8 kg)   Height: 5' 1\" (1.549 m)     Body mass index is 51.58 kg/(m^2). Was the patient's timed Up & Go test unsteady or longer than 30 seconds? N/a - Wheelchair bound    Advice/Referrals/Counseling (as indicated)   Education and counseling provided for any problems identified above: shingles and flu vaccines, AMD, cholesterol and diabetes screening    Preventive Services     (Preventive care checklist to be included in patient instructions)  Discussed today Done Previously Not Needed    X   Pneumococcal vaccines   X   Flu vaccine      Hepatitis B vaccine (if at risk)   X   Shingles vaccine   X   TDAP vaccine    5/2/2017  Mammogram   X  X Pap smear     X AAA screening      Colorectal cancer screening     X Low-dose CT for lung cancer screening    1/23/2017  Bone density test   X 4/1/2015  Glaucoma screening   X 8/13/2013  Cholesterol test   X 10/17/2014  Diabetes screening test       Diabetes self-management class/nutritionist           Discussion of Advance Directive     Patient was offered the opportunity to discuss advance care planning:  yes, however patient has dx of Alzheimer's so may not have capacity to execute. However, we did discuss AMD and gave pt information     Does patient have an Advance Directive:  No     If no, did you provide information on [facility]?   yes       Assessment/Plan   Z00.00    Carolyn Campbell NP

## 2017-08-03 NOTE — ACP (ADVANCE CARE PLANNING)
Patient does not have an [address] I discussed the basics of Advanced Care Planning with patient, including what the document does, purpose of a health care proxy and how to execute ACP. Patient was given \"Your Right to Decide\" brochure, contact information for office nurse navigator and will review information.

## 2017-08-03 NOTE — PROGRESS NOTES
HISTORY OF PRESENT ILLNESS  Jada Dupont is a 64 y.o. female presents with Annual Wellness Visit; Referral Request (neurology); ED Follow-up (7/25/17 [facility] (note in encounters) ); Headache (\"comes and goes\"; pain average is about a 7/10, but varies with each HA; dull and achy pain. ); Arm swelling (right arm; does not know why it is swollen; just started to hurt \"a little\" was not hurting at first); Leg Swelling (bilateral legs; has stockings on today; pt stated that they took her wraps off a last week, \"the lady was supposed to come back on tuesday to see how my legs were doing with out the wraps, and she never came back. \"); and Results (mammogram )    Agree with nurse note. Pleasantly demented, hypertensive, hyperglycemic pt with hypercholesterolemia, statin intolerance, Vit D deficiency, LE venous stasis, lumbar spinal stenosis, BL LE weakness, osteoporosis, gait instability, cataracts, and esotropia presents to the office with a BP of 97/59. She takes Aspirin 81 mg daily, tolerating well. Patient denies vision changes, dizziness, chest pain, SOB, or new swelling. She was taken to the ER via squad on 07/25/17 for headache extending in the L side of her face. She took Tylenol with temporary relief. Denies slurred speech, pain, tingling, extremity weakness, double vision, or blurry vision with HA. Urine culture grew >100,000 colonies of proteus mirabilis. UA contained epithelial cells and 2+ bacteria. Potassium was 3.3. Glucose was 120. Calcium was 8.3. ESR was 24. CXR showed mild linear atelectasis or scarring in the R midlung. Cardiomegaly w/o CHF. Head CT was unremarkable. Dx'd nonintractable headache. Tx'd with Fioricet -40 mg orally and Zofran 4 mg orally. Rx'd Zofran 8 mg, Fioricet -40 mg x15 tablets, Keflex 500 mg BID x7 days. Recommend f/u with neurologist, Dr. Mariah Palacios. Today the head pain is gone but she has L cheek pain.  She has had a cough that alternates between dry and productive with white sputum, which she attributes to allergies. Denies chest tightness, wheezing, fever, chills or other associated symptoms. Per chart review, she has been referred to neurology in 2016, 2015, 2013, 2012, twice in 2011, and once in 2009. She saw neurologist, MAUREEN Perez on 8/4/15. She noted dementia, gait instability, and depression with anxiety. MAUREEN Valdovinos recommended the pt f/u with Dr. Yasmine Cabrera and f/u with her in 1 year. She saw Dr. Yasmine Cabrera on 3/22/16. He noted she was living in a new living facility and transitioning well. He wanted her to continue taking Remeron 15 mg daily although she was steadily gaining weight. He wanted her to increase activity and exercise. F/U 4 months. She is unsure of why she never followed up with neurology or psychiatry. She last saw cardiologist, Dr. Ellie Hardy on 03/06/14. He was seeing her for CAD and leg swelling. F/U 6 months. Patient denies vision changes, dizziness, chest pain, SOB, or new swelling. She had a nurse who was coming weekly to assist her with her leg swelling but notes she did not come this week. Health Maintenance    Initial Annual [address] completed by MAUREEN Styles today. She has not seen optometrist, Dr. Madhu Styles within the last year but will make an appointment. She had a mammogram on 05/02/17 at [facility] and it is noted that it was her first mammogram ever. A 1.5 cm oval mass was noted in the L breast lateral retroareolar region was indeterminate. US was recommended. there were grouped linearly aligned calcifications in te L retroareolar region are indeterminate. Magnification views are recommended. She is unsure if she ever had additional imaging. Written by denisse Watson, as dictated by Dr. Nesha Saha DO.    ROS    Review of Systems negative except as noted above in HPI.     ALLERGIES:    Allergies   Allergen Reactions    Aristocort [Triamcinolone] Hives     Aristocort shot    Codeine Itching    Lasix [Furosemide] Itching    Bupropion Other (comments)     Severe headache    Darvocet A500 [Propoxyphene N-Acetaminophen] Other (comments)     headache    Influenza Virus Vaccine Whole Other (comments)     pharyngitis    Mevacor [Lovastatin] Other (comments)     myalgias    Percocet [Oxycodone-Acetaminophen] Itching     Worse headache    Rhinocort Other (comments)     nosebleed    Simvastatin Other (comments)     myalgias       CURRENT MEDICATIONS:    Outpatient Prescriptions Marked as Taking for the 8/3/17 encounter (Office Visit) with Alejandro Li, DO   Medication Sig Dispense Refill    aspirin 81 mg chewable tablet Take 1 Tab by mouth daily. Indications: prevention of cerebrovascular accident 80 Tab 3    cephALEXin (KEFLEX) 500 mg capsule Take 1 Cap by mouth two (2) times a day for 7 days. 14 Cap 0    ondansetron (ZOFRAN ODT) 8 mg disintegrating tablet Take 1 Tab by mouth every eight (8) hours as needed for Nausea. 10 Tab 0    butalbital-acetaminophen-caffeine (ESGIC) -40 mg per tablet Take 1 Tab by mouth every six (6) hours as needed for Pain. Max Daily Amount: 4 Tabs. 15 Tab 0    albuterol (PROVENTIL HFA, VENTOLIN HFA, PROAIR HFA) 90 mcg/actuation inhaler Take 2 Puffs by inhalation every six (6) hours as needed for Wheezing or Shortness of Breath. Indications: BRONCHOSPASM PREVENTION 1 Inhaler 1    guaiFENesin-dextromethorphan (SILTUSSIN DM CARMONA)  mg/5 mL liqd Take 10 mL by mouth every four (4) hours as needed. Indications: COLD SYMPTOMS 236 mL 0    MAPAP 325 mg tablet TAKE 2 TABLETS BY MOUTH EVERY 6-8 HOURS AS NEEDED FOR PAIN 90 Tab 0    mirtazapine (REMERON SOL-TAB) 15 mg disintegrating tablet Take 15 mg by mouth nightly.  fluticasone (FLONASE) 50 mcg/actuation nasal spray 2 Sprays by Both Nostrils route daily.  Indications: ALLERGIC RHINITIS 1 Bottle 6    OTHER Please dispense an Ultra light Wheelchair DX.severe osteoarthritis, fibromyalgia, Bell's Palsy, DJD,muscle weakness, lumbar stenosis, knee and shoulder pain 1 Device 0    amlodipine (NORVASC) 2.5 mg tablet Take 1 tablet by mouth daily. 30 tablet 11    bumetanide (BUMEX) 1 mg tablet TAKE 1 TABLET BY MOUTH EVERY DAY 30 tablet 11    potassium chloride SR (KLOR-CON 10) 10 mEq tablet TAKE 1 TABLET BY MOUTH EVERY DAY 30 tablet 11    lisinopril (PRINIVIL, ZESTRIL) 5 mg tablet Take 1 tablet by mouth daily. 30 tablet 11       PAST MEDICAL HISTORY:    Past Medical History:   Diagnosis Date    Abnormal cardiovascular stress test 04/18/08    Dr. Molly Morin    Allergy, unspecified not elsewhere classified     Bell's palsy 02/2005    Left. Dr. Zoe Paula CAD (coronary artery disease)     Dr. Sobia Duron. Dr. Ángela Izquierdo Chest pain 2002    Dr. Martita Azevedo Chronic headache 2013    due to depression. Dr. Kian Magallanes.  Chronic pain     LEGS AND FEET; BACK UNDER RIGHT SHOULDER    Congenital eye disorder     Lazy eyes. Dr. Raciel Coronado.  Depression 2011    Dr. Zhou Young (psychologist). Dr. Kian Magallanes. Dr. Shantel Soria.  DJD (degenerative joint disease)     knees, low back. Dr. Surya Yancey. Dr. Porfirio Obregon. Dr. Cynthia Guevara Gait instability 02/10/09    Dr. Ariella Argueta. Dr. Kian Magallanes.  Gallstones     GERD (gastroesophageal reflux disease)     Hiatal hernia 1977    Hypercholesteremia     Hypertension     Ill-defined condition     \"JUST ONE NIGHT I WENT TO STAND UP AND I JUST COULDN'T STAND\"  PT NOT SURE WHY    Leg weakness 12/2009    Dr. Maia Coronado. Dr. Candie Hathaway.  Lower extremity venous stasis     Dr. Cadence Bowens    Lumbar spinal stenosis 12/2009    L3-5. Dr. Jersey Yoder. PARAPLEGIA SINCE 2012    Lymphedema 12/19/08    Dr. Carolin Booth then Dr. Martines Found    Memory loss     Menopause     Migraine     Ocular.   Dr. Saul Walsh NORMA (obstructive sleep apnea) 01/24/07 Dr. Rekha Santana. NO CPAP    Osteoporosis 02/10/09    Dr. Alyson Jang    Pancreatitis 03/2011    Post-menopausal bleeding 07/2016    due to Dr. Gigi Bauer Jr.    Rotator cuff injury     Left. Dr. Valentin Hauser; chronic bilateral    Seborrheic keratosis 2005    Dr. Doni Hollins 02/05/09    Dr. Skip Curiel Tremor 2013    Dr. Amanda Hoffman.  Unspecified vitamin D deficiency 07/2009    Urinary incontinence 2013    Dr. Lary Boas Venous insufficiency 1998    R>L. with chronic leg edema. Dr. Dylan Meeks       PAST SURGICAL HISTORY:    Past Surgical History:   Procedure Laterality Date    HX HEART CATHETERIZATION  06/2008    Dr. Claudene Corning. due to abnormal Stress Test    HX MAR AND BSO  09/14/2016    Dr. Hilda Gil, [facility] HYSTEROSCOPIC MYOMECTOMY  07/27/2016    D AND C WITH SYMPHION, RESECTION OF ENDOMETRIAL TISSUE. benign. due to postmenopausal bleeding. Dr. Jp Ritter. FAMILY HISTORY:    Family History   Problem Relation Age of Onset    Hypertension Mother     Arthritis-osteo Mother     Dementia Mother     Cancer Father      liver    Heart Attack Father     Other Father      kidney stones    Stroke Maternal Grandmother     Hypertension Sister    Petrona Curiel Arthritis-osteo Sister     Diabetes Paternal Aunt     Anesth Problems Neg Hx        SOCIAL HISTORY:    Social History     Social History    Marital status: SINGLE     Spouse name: N/A    Number of children: N/A    Years of education: N/A     Social History Main Topics    Smoking status: Former Smoker     Packs/day: 0.25     Years: 0.50     Types: Cigarettes     Quit date: 1/1/1962    Smokeless tobacco: Never Used      Comment: Pt smoked 3-4 cigs x 9 mos.   Pt lived with smoker dad x 40 yrs    Alcohol use No    Drug use: No    Sexual activity: Yes     Partners: Male     Birth control/ protection: Condom     Other Topics Concern    None     Social History Narrative IMMUNIZATIONS:    Immunization History   Administered Date(s) Administered    TB Skin Test (PPD) Intradermal 08/05/2013    TD Vaccine 03/28/2008         PHYSICAL EXAMINATION    Vital Signs    Visit Vitals    BP 97/59 (BP 1 Location: Left arm, BP Patient Position: Sitting)    Pulse 75    Temp 97.8 °F (36.6 °C) (Oral)    Resp 16    Ht 5' 1\" (1.549 m)    Wt 273 lb (123.8 kg)    SpO2 95%    BMI 51.58 kg/m2       Weight Metrics 8/3/2017 7/25/2017 1/23/2017 9/29/2016 7/27/2016 7/22/2016 7/5/2016   Weight 273 lb 273 lb 273 lb 273 lb 273 lb 222 lb 220 lb   BMI 51.58 kg/m2 51.58 kg/m2 53.32 kg/m2 53.32 kg/m2 53.32 kg/m2 43.36 kg/m2 41.59 kg/m2   Some encounter information is confidential and restricted. Go to Review Flowsheets activity to see all data. General appearance - Well nourished. Well appearing. Well developed. No acute distress. Obese. Head - Normocephalic. Atraumatic. BL maxillary sinus pain on palpation. No TMJ pain on palpation with jaw drop. Eyes - pupils equal and reactive. Extraocular eye movements abnormal. Sclera anicteric. Mildly injected sclera. Ears - Hearing is grossly normal bilaterally. Nose - normal and patent. No polyps noted. No erythema. No discharge. Mouth - mucous membranes with adequate moisture. Posterior pharynx normal with cobblestone appearance. No erythema, white exudate or obstruction. Neck - supple. Midline trachea. No carotid bruits noted bilaterally. No thyromegaly noted. Chest - clear to auscultation bilaterally anteriorly and posteriorly. No wheezes. No rales or rhonchi. Breath sounds are symmetrical bilaterally. Unlabored respirations. Heart - normal rate. Regular rhythm. Normal S1, S2. No murmur noted. No rubs, clicks or gallops noted. Abdomen - soft and nondistended. No masses or organomegaly. No rebound, rigidity or guarding. Bowel sounds normal x 4 quadrants. No tenderness noted.   Neurological - awake, alert and oriented to person, place, and time and event. Cranial nerves II through XII intact. Clear speech. Muscle strength is +5/5 x 4 extremities. Sensation is intact to light touch bilaterally. Wheelchair bound pt. Heme/Lymph - peripheral pulses normal x 2 extremities, difficult to assess in BL feet due to swelling. BL nontender LE edema is noted. Musculoskeletal - Intact x 4 extremities. Decreased ROM x 4 extremities, worse in BL LEs > BL UEs. BL knee pain on palpation extending into BL LE. Back exam - normal range of motion. No pain on palpation of the spinous processes in the cervical, thoracic, lumbar, sacral regions. No CVA tenderness. Skin - no rashes, erythema, ecchymosis, lacerations, abrasions, suspicious moles noted  Psychological -   normal behavior, dress and thought processes. Occ good insight. Good eye contact. Somewhat flat affect. Appropriate mood. Normal speech.       DATA REVIEWED    Results for orders placed or performed during the hospital encounter of 07/25/17   CULTURE, URINE   Result Value Ref Range    Special Requests: NO SPECIAL REQUESTS  Reflexed from [id number]        [place] Count >100,000  COLONIES/mL        Culture result: PROTEUS MIRABILIS (A)         Susceptibility    Proteus mirabilis - KURT     Amikacin ($) <=16 Susceptible ug/mL     Ampicillin ($) <=8 Susceptible ug/mL     Ampicillin/sulbactam ($) <=8/4 Susceptible ug/mL     Aztreonam ($$$$) <=4 Susceptible ug/mL     Cefazolin ($) <=8 Susceptible ug/mL     Cefepime ($$) <=4 Susceptible ug/mL     Cefotaxime <=2 Susceptible ug/mL     Ceftazidime ($) <=1 Susceptible ug/mL     Ceftriaxone ($) <=1 Susceptible ug/mL     Cefuroxime ($) <=4 Susceptible ug/mL     Ciprofloxacin ($) <=1 Susceptible ug/mL     Gentamicin ($) <=4 Susceptible ug/mL     Levofloxacin ($) <=2 Susceptible ug/mL     Meropenem ($$) <=1 Susceptible ug/mL     Piperacillin/Tazobac ($) <=16 Susceptible ug/mL     Tobramycin ($) <=4 Susceptible ug/mL Trimeth-Sulfamethoxa <=2/38 Susceptible ug/mL   CBC WITH AUTOMATED DIFF   Result Value Ref Range    WBC 6.8 3.6 - 11.0 K/uL    RBC 4.35 3.80 - 5.20 M/uL    HGB 12.5 11.5 - 16.0 g/dL    HCT 40.1 35.0 - 47.0 %    MCV 92.2 80.0 - 99.0 FL    MCH 28.7 26.0 - 34.0 PG    MCHC 31.2 30.0 - 36.5 g/dL    RDW 14.9 (H) 11.5 - 14.5 %    PLATELET 157 636 - 436 K/uL    NEUTROPHILS 58 32 - 75 %    LYMPHOCYTES 32 12 - 49 %    MONOCYTES 6 5 - 13 %    EOSINOPHILS 4 0 - 7 %    BASOPHILS 0 0 - 1 %    ABS. NEUTROPHILS 4.0 1.8 - 8.0 K/UL    ABS. LYMPHOCYTES 2.2 0.8 - 3.5 K/UL    ABS. MONOCYTES 0.4 0.0 - 1.0 K/UL    ABS. EOSINOPHILS 0.2 0.0 - 0.4 K/UL    ABS. BASOPHILS 0.0 0.0 - 0.1 K/UL   METABOLIC PANEL, COMPREHENSIVE   Result Value Ref Range    Sodium 139 136 - 145 mmol/L    Potassium 3.3 (L) 3.5 - 5.1 mmol/L    Chloride 101 97 - 108 mmol/L    CO2 34 (H) 21 - 32 mmol/L    Anion gap 4 (L) 5 - 15 mmol/L    Glucose 120 (H) 65 - 100 mg/dL    BUN 9 6 - 20 MG/DL    Creatinine 0.96 0.55 - 1.02 MG/DL    BUN/Creatinine ratio 9 (L) 12 - 20      GFR est AA >60 >60 ml/min/1.73m2    GFR est non-AA 56 (L) >60 ml/min/1.73m2    Calcium 8.3 (L) 8.5 - 10.1 MG/DL    Bilirubin, total 0.2 0.2 - 1.0 MG/DL    ALT (SGPT) 14 12 - 78 U/L    AST (SGOT) 11 (L) 15 - 37 U/L    Alk.  phosphatase 90 45 - 117 U/L    Protein, total 7.2 6.4 - 8.2 g/dL    Albumin 3.3 (L) 3.5 - 5.0 g/dL    Globulin 3.9 2.0 - 4.0 g/dL    A-G Ratio 0.8 (L) 1.1 - 2.2     SED RATE (ESR)   Result Value Ref Range    Sed rate, automated 24 0 - 30 mm/hr   URINALYSIS W/ REFLEX CULTURE   Result Value Ref Range    Color YELLOW/STRAW      Appearance CLOUDY (A) CLEAR      Specific gravity 1.015 1.003 - 1.030      pH (UA) 7.0 5.0 - 8.0      Protein NEGATIVE  NEG mg/dL    Glucose NEGATIVE  NEG mg/dL    Ketone NEGATIVE  NEG mg/dL    Bilirubin NEGATIVE  NEG      Blood NEGATIVE  NEG      Urobilinogen 1.0 0.2 - 1.0 EU/dL    Nitrites NEGATIVE  NEG      Leukocyte Esterase NEGATIVE  NEG      WBC 0-4 0 - 4 /hpf RBC 0-5 0 - 5 /hpf    Epithelial cells MODERATE (A) FEW /lpf    Bacteria 2+ (A) NEG /hpf    UA:UC IF INDICATED URINE CULTURE ORDERED (A) CNI     EKG, 12 LEAD, INITIAL   Result Value Ref Range    Ventricular Rate 85 BPM    Atrial Rate 85 BPM    P-R Interval 164 ms    QRS Duration 102 ms    Q-T Interval 396 ms    QTC Calculation (Bezet) 471 ms    Calculated P Axis 18 degrees    Calculated R Axis -4 degrees    Calculated T Axis 14 degrees    Diagnosis       Sinus rhythm with premature atrial complexes  Minimal voltage criteria for LVH, may be normal variant  When compared with ECG of 22-JUL-2016 14:30,  premature atrial complexes are now present  Confirmed by Lien Mendez ([id number]) on 7/26/2017 10:28:07 AM         ASSESSMENT and PLAN      ICD-10-CM ICD-9-CM    1. Medicare annual wellness visit, initial Z00.00 V70.0    2. Left temporal headache R51 784.0 SED RATE (ESR)      REFERRAL TO NEUROLOGY      REFERRAL TO OPTOMETRY    due to TMJ vs dental vs Migraine vs other   3. Essential hypertension B33 367.2 METABOLIC PANEL, COMPREHENSIVE      MICROALBUMIN, UR, RAND W/ MICROALBUMIN/CREA RATIO      URINALYSIS W/ RFLX MICROSCOPIC      LIPID PANEL      TSH 3RD GENERATION      REFERRAL TO OPTOMETRY    stable   4. Acute UTI N39.0 599.0 URINALYSIS W/ RFLX MICROSCOPIC    due to P. mirabilis   5. Hypercholesteremia C40.35 811.8 METABOLIC PANEL, COMPREHENSIVE      LIPID PANEL   6. Hyperglycemia A96.7 001.93 METABOLIC PANEL, COMPREHENSIVE      HEMOGLOBIN A1C WITH EAG   7. Coronary artery disease involving native coronary artery of native heart without angina pectoris I25.10 414.01    8. Dementia in Alzheimer's disease with late onset G30.1 331.0 REFERRAL TO NEUROLOGY    F02.80 294.10    9. Alcohol screening Z13.89 V79.1    10. Depression screening Z13.89 V79.0    11. Vitamin D deficiency E55.9 268.9 VITAMIN D, 25 HYDROXY   12.  Tremor R25.1 781.0 REFERRAL TO NEUROLOGY   13. Depression with anxiety F41.8 300.4 REFERRAL TO PSYCHIATRY 14. Hypokalemia K08.3 495.1 METABOLIC PANEL, COMPREHENSIVE      MAGNESIUM    due to Bumex vs other   15. Lower extremity venous stasis I87.8 459.81    16. Lumbar spinal stenosis M48.06 724.02    17. Weakness of both lower extremities R29.898 729.89 REFERRAL TO NEUROLOGY   18. Age-related osteoporosis without current pathological fracture M81.0 733.01 DEXA BONE DENSITY STUDY AXIAL   19. Gait instability R26.81 781.2 REFERRAL TO NEUROLOGY   20. Non-seasonal allergic rhinitis due to other allergic trigger J30.89 477.8    21. Age-related nuclear cataract of both eyes H25.13 366.16    22. Esotropia H50.00 378.00 REFERRAL TO OPTOMETRY   23. Statin intolerance Z78.9 995.27    24. Bilateral chronic knee pain M25.561 719.46     M25.562 338.29     G89.29     25. Abnormal mammogram of left breast R92.8 793.80 US BREAST LT COMPLETE 4 QUAD   26. Osteoporosis screening Z13.820 V82.81 DEXA BONE DENSITY STUDY AXIAL   27. Weight gain R63.5 783.1     50# PER PT, due to Remeron vs inactivity vs other       Discussed the patient's BMI with her. The BMI follow up plan is as follows: I have counseled this patient on diet and exercise regimens. Decrease carbohydrates (white foods, sweet foods, sweet drinks and alcohol), increase green leafy vegetables and protein (lean meats and beans) with each meal.  Avoid fried foods. Eat 3-5 small meals daily. Do not skip meals. Increase water intake. Increase physical activity to 30 minutes daily for health benefit or 60 minutes daily to prevent weight regain, as tolerated. Get 7-8 hours uninterrupted sleep nightly. Chart reviewed and updated. Staff message sent to my nurse, Vladislav Dhaliwal, regarding finding out how to make sure pt has labs drawn and to make sure her facility is aware of her specialists appointments so she has rides to and from them     Continue current medications and care. Most recent tests reviewed from ER. Reviewed Head CT and CXR. Mammogram from 05/2017 reviewed.  L breast US ordered. DEXA scan ordered. Recheck pertinent labs when fasting. Recent office visit notes from ER, NP Romel King, Dr. Maynor Burroughs, and Dr. Alice Merritt reviewed. Referrals given; patient urged to keep appointments with specialists. Neurology. Psychiatry. Optometry. Counseled patient on health concerns:  HA, allergies, and abnormal mammogram.  Leg edema. Immunizations noted. Offered empathy, support, legitimation, prayers, partnership to patient. Praised patient for progress. Follow-up Disposition:  Return in about 6 months (around 2/3/2018) for bp, headache, results. Patient was offered a choice/choices in the treatment plan today. Patient expresses understanding of the plan and agrees with recommendations. Patient declines any additional handouts. Patient is satisfied with previous handouts received from our office    More than 50 mins spent face to face with patient and more than 50% of this time spent in counseling and coordinating care. Written by denisse Perez, as dictated by Dr. Moraima Kilgore DO. Documentation True and Accepted by Pam Walker.  Fan Luu

## 2017-08-10 ENCOUNTER — TELEPHONE (OUTPATIENT)
Dept: FAMILY MEDICINE CLINIC | Age: 77
End: 2017-08-10

## 2017-08-10 NOTE — TELEPHONE ENCOUNTER
Patient has called in regards to pain in her chest and pain in her back. Transferred call to nurse for Triage.

## 2017-08-10 NOTE — TELEPHONE ENCOUNTER
Aziza Perez from [employer] on behalf of patient. Says patient says she has a cold and they want to know if dr Adile Lima can put in a standing order for robitussin or musicinex. They also want to know if patient can be give a nebulizer treatment. Says patient has no fever. The contact # for Riccardo Young is [phone].

## 2017-08-10 NOTE — TELEPHONE ENCOUNTER
Writer spoke with patient to get more information regarding her chest pain and back pain. Pt stated that she was having sharp chest pain when she takes a deep breath, upper back pain \"under the bone\", right-sided pain that was dull and achy, and facial pain \"that feels like a bad headache. \" Pt denied having SOB when writer asked. Dr. Shadia Smith made aware of pt's symptoms; Dr. Shadia Smith stated that pt needs to go to the ER to be evaluated; writer notified pt, and pt verbalized understanding.

## 2017-08-11 ENCOUNTER — APPOINTMENT (OUTPATIENT)
Dept: CT IMAGING | Age: 77
End: 2017-08-11
Attending: EMERGENCY MEDICINE
Payer: MEDICARE

## 2017-08-11 ENCOUNTER — APPOINTMENT (OUTPATIENT)
Dept: ULTRASOUND IMAGING | Age: 77
End: 2017-08-11
Attending: EMERGENCY MEDICINE
Payer: MEDICARE

## 2017-08-11 ENCOUNTER — APPOINTMENT (OUTPATIENT)
Dept: GENERAL RADIOLOGY | Age: 77
End: 2017-08-11
Attending: EMERGENCY MEDICINE
Payer: MEDICARE

## 2017-08-11 ENCOUNTER — HOSPITAL ENCOUNTER (EMERGENCY)
Age: 77
Discharge: HOME | End: 2017-08-11
Attending: EMERGENCY MEDICINE
Payer: MEDICARE

## 2017-08-11 VITALS
TEMPERATURE: 98.3 F | WEIGHT: 273 LBS | HEART RATE: 67 BPM | BODY MASS INDEX: 51.54 KG/M2 | DIASTOLIC BLOOD PRESSURE: 65 MMHG | HEIGHT: 61 IN | RESPIRATION RATE: 17 BRPM | SYSTOLIC BLOOD PRESSURE: 133 MMHG | OXYGEN SATURATION: 98 %

## 2017-08-11 DIAGNOSIS — J02.9 SORE THROAT: ICD-10-CM

## 2017-08-11 DIAGNOSIS — N28.1 RENAL CYST, RIGHT: ICD-10-CM

## 2017-08-11 DIAGNOSIS — R05.9 COUGH: ICD-10-CM

## 2017-08-11 DIAGNOSIS — R07.89 ATYPICAL CHEST PAIN: Primary | ICD-10-CM

## 2017-08-11 DIAGNOSIS — R10.13 ABDOMINAL PAIN, EPIGASTRIC: ICD-10-CM

## 2017-08-11 DIAGNOSIS — K42.9 UMBILICAL HERNIA WITHOUT OBSTRUCTION AND WITHOUT GANGRENE: ICD-10-CM

## 2017-08-11 LAB
ALBUMIN SERPL BCP-MCNC: 3 G/DL (ref 3.5–5)
ALBUMIN/GLOB SERPL: 0.8 {RATIO} (ref 1.1–2.2)
ALP SERPL-CCNC: 80 U/L (ref 45–117)
ALT SERPL-CCNC: 13 U/L (ref 12–78)
ANION GAP BLD CALC-SCNC: 5 MMOL/L (ref 5–15)
APPEARANCE UR: ABNORMAL
AST SERPL W P-5'-P-CCNC: 11 U/L (ref 15–37)
ATRIAL RATE: 82 BPM
BACTERIA URNS QL MICRO: NEGATIVE /HPF
BASOPHILS # BLD AUTO: 0 K/UL (ref 0–0.1)
BASOPHILS # BLD: 1 % (ref 0–1)
BILIRUB SERPL-MCNC: 0.3 MG/DL (ref 0.2–1)
BILIRUB UR QL CFM: NEGATIVE
BUN SERPL-MCNC: 9 MG/DL (ref 6–20)
BUN/CREAT SERPL: 10 (ref 12–20)
CALCIUM SERPL-MCNC: 8.4 MG/DL (ref 8.5–10.1)
CALCULATED P AXIS, ECG09: 21 DEGREES
CALCULATED R AXIS, ECG10: -3 DEGREES
CALCULATED T AXIS, ECG11: 15 DEGREES
CHLORIDE SERPL-SCNC: 104 MMOL/L (ref 97–108)
CK SERPL-CCNC: 80 U/L (ref 26–192)
CO2 SERPL-SCNC: 32 MMOL/L (ref 21–32)
COLOR UR: ABNORMAL
CREAT SERPL-MCNC: 0.88 MG/DL (ref 0.55–1.02)
DIAGNOSIS, 93000: NORMAL
EOSINOPHIL # BLD: 0.4 K/UL (ref 0–0.4)
EOSINOPHIL NFR BLD: 7 % (ref 0–7)
EPITH CASTS URNS QL MICRO: ABNORMAL /LPF
ERYTHROCYTE [DISTWIDTH] IN BLOOD BY AUTOMATED COUNT: 15.9 % (ref 11.5–14.5)
GLOBULIN SER CALC-MCNC: 3.7 G/DL (ref 2–4)
GLUCOSE SERPL-MCNC: 91 MG/DL (ref 65–100)
GLUCOSE UR STRIP.AUTO-MCNC: NEGATIVE MG/DL
HCT VFR BLD AUTO: 38.2 % (ref 35–47)
HGB BLD-MCNC: 11.7 G/DL (ref 11.5–16)
HGB UR QL STRIP: NEGATIVE
KETONES UR QL STRIP.AUTO: NEGATIVE MG/DL
LEUKOCYTE ESTERASE UR QL STRIP.AUTO: NEGATIVE
LIPASE SERPL-CCNC: 159 U/L (ref 73–393)
LYMPHOCYTES # BLD AUTO: 38 % (ref 12–49)
LYMPHOCYTES # BLD: 2.2 K/UL (ref 0.8–3.5)
MAGNESIUM SERPL-MCNC: 1.9 MG/DL (ref 1.6–2.4)
MCH RBC QN AUTO: 27.8 PG (ref 26–34)
MCHC RBC AUTO-ENTMCNC: 30.6 G/DL (ref 30–36.5)
MCV RBC AUTO: 90.7 FL (ref 80–99)
MONOCYTES # BLD: 0.3 K/UL (ref 0–1)
MONOCYTES NFR BLD AUTO: 5 % (ref 5–13)
MUCOUS THREADS URNS QL MICRO: ABNORMAL /LPF
NEUTS SEG # BLD: 2.9 K/UL (ref 1.8–8)
NEUTS SEG NFR BLD AUTO: 49 % (ref 32–75)
NITRITE UR QL STRIP.AUTO: NEGATIVE
P-R INTERVAL, ECG05: 180 MS
PH UR STRIP: 6.5 [PH] (ref 5–8)
PLATELET # BLD AUTO: 273 K/UL (ref 150–400)
POTASSIUM SERPL-SCNC: 3.5 MMOL/L (ref 3.5–5.1)
PROT SERPL-MCNC: 6.7 G/DL (ref 6.4–8.2)
PROT UR STRIP-MCNC: ABNORMAL MG/DL
Q-T INTERVAL, ECG07: 398 MS
QRS DURATION, ECG06: 108 MS
QTC CALCULATION (BEZET), ECG08: 464 MS
RBC # BLD AUTO: 4.21 M/UL (ref 3.8–5.2)
RBC #/AREA URNS HPF: ABNORMAL /HPF (ref 0–5)
SODIUM SERPL-SCNC: 141 MMOL/L (ref 136–145)
SP GR UR REFRACTOMETRY: 1.02 (ref 1–1.03)
TROPONIN I SERPL-MCNC: <0.04 NG/ML
UROBILINOGEN UR QL STRIP.AUTO: 4 EU/DL (ref 0.2–1)
VENTRICULAR RATE, ECG03: 82 BPM
WBC # BLD AUTO: 5.8 K/UL (ref 3.6–11)
WBC URNS QL MICRO: ABNORMAL /HPF (ref 0–4)

## 2017-08-11 PROCEDURE — 83690 ASSAY OF LIPASE: CPT | Performed by: EMERGENCY MEDICINE

## 2017-08-11 PROCEDURE — 83735 ASSAY OF MAGNESIUM: CPT | Performed by: EMERGENCY MEDICINE

## 2017-08-11 PROCEDURE — 96374 THER/PROPH/DIAG INJ IV PUSH: CPT

## 2017-08-11 PROCEDURE — 74011250636 HC RX REV CODE- 250/636: Performed by: EMERGENCY MEDICINE

## 2017-08-11 PROCEDURE — 84484 ASSAY OF TROPONIN QUANT: CPT

## 2017-08-11 PROCEDURE — 84484 ASSAY OF TROPONIN QUANT: CPT | Performed by: EMERGENCY MEDICINE

## 2017-08-11 PROCEDURE — 82550 ASSAY OF CK (CPK): CPT | Performed by: EMERGENCY MEDICINE

## 2017-08-11 PROCEDURE — 81001 URINALYSIS AUTO W/SCOPE: CPT | Performed by: EMERGENCY MEDICINE

## 2017-08-11 PROCEDURE — 74177 CT ABD & PELVIS W/CONTRAST: CPT

## 2017-08-11 PROCEDURE — 76705 ECHO EXAM OF ABDOMEN: CPT

## 2017-08-11 PROCEDURE — 74011636320 HC RX REV CODE- 636/320: Performed by: EMERGENCY MEDICINE

## 2017-08-11 PROCEDURE — 74011000250 HC RX REV CODE- 250: Performed by: EMERGENCY MEDICINE

## 2017-08-11 PROCEDURE — 99285 EMERGENCY DEPT VISIT HI MDM: CPT

## 2017-08-11 PROCEDURE — 36415 COLL VENOUS BLD VENIPUNCTURE: CPT | Performed by: EMERGENCY MEDICINE

## 2017-08-11 PROCEDURE — 77030011943

## 2017-08-11 PROCEDURE — 93005 ELECTROCARDIOGRAM TRACING: CPT

## 2017-08-11 PROCEDURE — 80053 COMPREHEN METABOLIC PANEL: CPT | Performed by: EMERGENCY MEDICINE

## 2017-08-11 PROCEDURE — 71020 XR CHEST PA LAT: CPT

## 2017-08-11 PROCEDURE — 74011250637 HC RX REV CODE- 250/637: Performed by: EMERGENCY MEDICINE

## 2017-08-11 PROCEDURE — 85025 COMPLETE CBC W/AUTO DIFF WBC: CPT | Performed by: EMERGENCY MEDICINE

## 2017-08-11 RX ORDER — SODIUM CHLORIDE 0.9 % (FLUSH) 0.9 %
10 SYRINGE (ML) INJECTION
Status: COMPLETED | OUTPATIENT
Start: 2017-08-11 | End: 2017-08-11

## 2017-08-11 RX ORDER — SODIUM CHLORIDE 9 MG/ML
50 INJECTION, SOLUTION INTRAVENOUS
Status: COMPLETED | OUTPATIENT
Start: 2017-08-11 | End: 2017-08-11

## 2017-08-11 RX ORDER — ACETAMINOPHEN 500 MG
1000 TABLET ORAL ONCE
Status: COMPLETED | OUTPATIENT
Start: 2017-08-11 | End: 2017-08-11

## 2017-08-11 RX ADMIN — FAMOTIDINE 20 MG: 10 INJECTION, SOLUTION INTRAVENOUS at 09:52

## 2017-08-11 RX ADMIN — Medication 10 ML: at 12:09

## 2017-08-11 RX ADMIN — ACETAMINOPHEN 1000 MG: 500 TABLET, FILM COATED ORAL at 09:50

## 2017-08-11 RX ADMIN — IOPAMIDOL 100 ML: 755 INJECTION, SOLUTION INTRAVENOUS at 12:09

## 2017-08-11 RX ADMIN — SODIUM CHLORIDE 50 ML/HR: 900 INJECTION, SOLUTION INTRAVENOUS at 12:09

## 2017-08-11 NOTE — ED NOTES
Dr. Lewis Treviño has reviewed discharge instructions with the patient. The patient verbalized understanding. Pt. A&Ox4, respirations even and unlabored. VS stable as noted in flowsheet. Wheelchair van from department with paperwork. Facility notified.

## 2017-08-11 NOTE — DISCHARGE INSTRUCTIONS
Upper Respiratory Infection (Cold): Care Instructions  Your Care Instructions    An upper respiratory infection, or URI, is an infection of the nose, sinuses, or throat. URIs are spread by coughs, sneezes, and direct contact. The common cold is the most frequent kind of URI. The flu and sinus infections are other kinds of URIs. Almost all URIs are caused by viruses. Antibiotics won't cure them. But you can treat most infections with home care. This may include drinking lots of fluids and taking over-the-counter pain medicine. You will probably feel better in 4 to 10 days. The doctor has checked you carefully, but problems can develop later. If you notice any problems or new symptoms, get medical treatment right away. Follow-up care is a key part of your treatment and safety. Be sure to make and go to all appointments, and call your doctor if you are having problems. It's also a good idea to know your test results and keep a list of the medicines you take. How can you care for yourself at home? · To prevent dehydration, drink plenty of fluids, enough so that your urine is light yellow or clear like water. Choose water and other caffeine-free clear liquids until you feel better. If you have kidney, heart, or liver disease and have to limit fluids, talk with your doctor before you increase the amount of fluids you drink. · Take an over-the-counter pain medicine, such as acetaminophen (Tylenol), ibuprofen (Advil, Motrin), or naproxen (Aleve). Read and follow all instructions on the label. · Before you use cough and cold medicines, check the label. These medicines may not be safe for young children or for people with certain health problems. · Be careful when taking over-the-counter cold or flu medicines and Tylenol at the same time. Many of these medicines have acetaminophen, which is Tylenol. Read the labels to make sure that you are not taking more than the recommended dose.  Too much acetaminophen (Tylenol) can be harmful. · Get plenty of rest.  · Do not smoke or allow others to smoke around you. If you need help quitting, talk to your doctor about stop-smoking programs and medicines. These can increase your chances of quitting for good. When should you call for help? Call 911 anytime you think you may need emergency care. For example, call if:  · You have severe trouble breathing. Call your doctor now or seek immediate medical care if:  · You seem to be getting much sicker. · You have new or worse trouble breathing. · You have a new or higher fever. · You have a new rash. Watch closely for changes in your health, and be sure to contact your doctor if:  · You have a new symptom, such as a sore throat, an earache, or sinus pain. · You cough more deeply or more often, especially if you notice more mucus or a change in the color of your mucus. · You do not get better as expected. Where can you learn more? Go to http://luigi-atif.info/. Enter Z717 in the search box to learn more about \"Upper Respiratory Infection (Cold): Care Instructions. \"  Current as of: March 25, 2017  Content Version: 11.3  © 9085-9228 [employer]. Care instructions adapted under license by [employer] (which disclaims liability or warranty for this information). If you have questions about a medical condition or this instruction, always ask your healthcare professional. [place] [address] any warranty or liability for your use of this information. Abdominal Pain: Care Instructions  Your Care Instructions    Abdominal pain has many possible causes. Some aren't serious and get better on their own in a few days. Others need more testing and treatment. If your pain continues or gets worse, you need to be rechecked and may need more tests to find out what is wrong. You may need surgery to correct the problem.   Don't ignore new symptoms, such as fever, nausea and vomiting, urination problems, pain that gets worse, and dizziness. These may be signs of a more serious problem. Your doctor may have recommended a follow-up visit in the next 8 to 12 hours. If you are not getting better, you may need more tests or treatment. The doctor has checked you carefully, but problems can develop later. If you notice any problems or new symptoms, get medical treatment right away. Follow-up care is a key part of your treatment and safety. Be sure to make and go to all appointments, and call your doctor if you are having problems. It's also a good idea to know your test results and keep a list of the medicines you take. How can you care for yourself at home? · Rest until you feel better. · To prevent dehydration, drink plenty of fluids, enough so that your urine is light yellow or clear like water. Choose water and other caffeine-free clear liquids until you feel better. If you have kidney, heart, or liver disease and have to limit fluids, talk with your doctor before you increase the amount of fluids you drink. · If your stomach is upset, eat mild foods, such as rice, dry toast or crackers, bananas, and applesauce. Try eating several small meals instead of two or three large ones. · Wait until 48 hours after all symptoms have gone away before you have spicy foods, alcohol, and drinks that contain caffeine. · Do not eat foods that are high in fat. · Avoid anti-inflammatory medicines such as aspirin, ibuprofen (Advil, Motrin), and naproxen (Aleve). These can cause stomach upset. Talk to your doctor if you take daily aspirin for another health problem. When should you call for help? Call 911 anytime you think you may need emergency care. For example, call if:  · You passed out (lost consciousness). · You pass maroon or very bloody stools. · You vomit blood or what looks like coffee grounds. · You have new, severe belly pain.   Call your doctor now or seek immediate medical care if:  · Your pain gets worse, especially if it becomes focused in one area of your belly. · You have a new or higher fever. · Your stools are black and look like tar, or they have streaks of blood. · You have unexpected vaginal bleeding. · You have symptoms of a urinary tract infection. These may include:  ¨ Pain when you urinate. ¨ Urinating more often than usual.  ¨ Blood in your urine. · You are dizzy or lightheaded, or you feel like you may faint. Watch closely for changes in your health, and be sure to contact your doctor if:  · You are not getting better after 1 day (24 hours). Where can you learn more? Go to http://luigi-atif.info/. Enter G515 in the search box to learn more about \"Abdominal Pain: Care Instructions. \"  Current as of: March 20, 2017  Content Version: 11.3  © 3694-7578 [employer]. Care instructions adapted under license by [employer] (which disclaims liability or warranty for this information). If you have questions about a medical condition or this instruction, always ask your healthcare professional. [place] [address] any warranty or liability for your use of this information. Chest Pain: Care Instructions  Your Care Instructions  There are many things that can cause chest pain. Some are not serious and will get better on their own in a few days. But some kinds of chest pain need more testing and treatment. Your doctor may have recommended a follow-up visit in the next 8 to 12 hours. If you are not getting better, you may need more tests or treatment. Even though your doctor has released you, you still need to watch for any problems. The doctor carefully checked you, but sometimes problems can develop later. If you have new symptoms or if your symptoms do not get better, get medical care right away.   If you have worse or different chest pain or pressure that lasts more than 5 minutes or you passed out (lost consciousness), call 911 or seek other emergency help right away. A medical visit is only one step in your treatment. Even if you feel better, you still need to do what your doctor recommends, such as going to all suggested follow-up appointments and taking medicines exactly as directed. This will help you recover and help prevent future problems. How can you care for yourself at home? · Rest until you feel better. · Take your medicine exactly as prescribed. Call your doctor if you think you are having a problem with your medicine. · Do not drive after taking a prescription pain medicine. When should you call for help? Call 911 if:  · You passed out (lost consciousness). · You have severe difficulty breathing. · You have symptoms of a heart attack. These may include:  ¨ Chest pain or pressure, or a strange feeling in your chest.  ¨ Sweating. ¨ Shortness of breath. ¨ Nausea or vomiting. ¨ Pain, pressure, or a strange feeling in your back, neck, jaw, or upper belly or in one or both shoulders or arms. ¨ Lightheadedness or sudden weakness. ¨ A fast or irregular heartbeat. After you call 911, the  may tell you to chew 1 adult-strength or 2 to 4 low-dose aspirin. Wait for an ambulance. Do not try to drive yourself. Call your doctor today if:  · You have any trouble breathing. · Your chest pain gets worse. · You are dizzy or lightheaded, or you feel like you may faint. · You are not getting better as expected. · You are having new or different chest pain. Where can you learn more? Go to http://luigi-atif.info/. Enter A120 in the search box to learn more about \"Chest Pain: Care Instructions. \"  Current as of: March 20, 2017  Content Version: 11.3  © 6308-3776 [employer]. Care instructions adapted under license by [employer] (which disclaims liability or warranty for this information).  If you have questions about a medical condition or this instruction, always ask your healthcare professional. [address] any warranty or liability for your use of this information. Hernia: Care Instructions  Your Care Instructions    A hernia develops when tissue bulges through a weak spot in the wall of your belly. The groin area and the navel are common areas for a hernia. A hernia can also develop near the area of a surgery you had before. Pressure from lifting, straining, or coughing can tear the weak area, causing the hernia to bulge and be painful. If you cannot push a hernia back into place, the tissue may become trapped outside the belly wall. If the hernia gets twisted and loses its blood supply, it will swell and die. This is called a strangulated hernia. It usually causes a lot of pain. It needs treatment right away. Some hernias need to be repaired to prevent a strangulated hernia. If your hernia causes symptoms or is large, you may need surgery. Follow-up care is a key part of your treatment and safety. Be sure to make and go to all appointments, and call your doctor if you are having problems. Its also a good idea to know your test results and keep a list of the medicines you take. How can you care for yourself at home? · Take care when lifting heavy objects. · Stay at a healthy weight. · Do not smoke. Smoking can cause coughing, which can cause your hernia to bulge. If you need help quitting, talk to your doctor about stop-smoking programs and medicines. These can increase your chances of quitting for good. · Talk with your doctor before wearing a corset or truss for a hernia. These devices are not recommended for treating hernias and sometimes can do more harm than good. There may be certain situations when your doctor thinks a truss would work, but these are rare. When should you call for help? Call your doctor now or seek immediate medical care if:  · You have severe belly pain.   · You have nausea or vomiting. · You have belly pain and are not passing gas or stool. · You cannot push the hernia back into place with gentle pressure when you are lying down. · The skin over the hernia turns red or becomes tender. Watch closely for changes in your health, and be sure to contact your doctor if:  · You have new or increased pain. · You do not get better as expected. Where can you learn more? Go to http://luigi-atif.info/. Enter C129 in the search box to learn more about \"Hernia: Care Instructions. \"  Current as of: August 9, 2016  Content Version: 11.3  © 7925-8489 [employer]. Care instructions adapted under license by [employer] (which disclaims liability or warranty for this information). If you have questions about a medical condition or this instruction, always ask your healthcare professional. [address] any warranty or liability for your use of this information.

## 2017-08-11 NOTE — ED TRIAGE NOTES
Patient states \"I really dont feel like I need to be here. They got all excited and called the ambulance. \" Patient reports that her complains is cough. Reports that the cough comes and goes.

## 2017-08-11 NOTE — ED TRIAGE NOTES
Triage Note: Per EMS ([address]), they received a call for CP. After arrival, they noted that they complaint is abdominal pain. Reports that the pain has subsided. Denies n/v/d. Also reporting cough. Patient appears to be in no respiratory distress upon arrival to ED.

## 2017-08-11 NOTE — ED PROVIDER NOTES
HPI Comments: 63 yo F with Hx of CAD, HTN, hypercholesterolemia, cholelithiasis without cholecystitis, and fibromyalgia presenting via EMS for evaluation of retrosternal and epigastric pain x PTA. Pt reports she has had a non-productive cough and sore throat for the past few days and after a coughing spell developed pain in her lower chest/upper ABD that radiates to bilateral flanks under her lower ribs. She also notes a dull/achy pain in her right upper back. The pain is minimally worsened with inspiration. She denies any F/C, SOB, N/V/D, or urinary Sxs. The history is provided by the patient. Past Medical History:   Diagnosis Date    Abnormal cardiovascular stress test 04/18/08    Dr. Severo Johnson    Allergy, unspecified not elsewhere classified     Bell's palsy 02/2005    Left. Dr. Doug Degroot CAD (coronary artery disease)     Dr. Kanika Wills. Dr. Viet Padilla Chest pain 2002    Dr. Tez Flores Chronic headache 2013    due to depression. Dr. Jessie Rollins.  Chronic pain     LEGS AND FEET; BACK UNDER RIGHT SHOULDER    Congenital eye disorder     Lazy eyes. Dr. Carito Olivas.  Depression 2011    Dr. Kady Child (psychologist). Dr. Jessie Rollins. Dr. Manuel Campbell.  DJD (degenerative joint disease)     knees, low back. Dr. Melony Morley. Dr. Tyrese Abdul. Dr. Camacho Single Gait instability 02/10/09    Dr. Sonam Villarreal. Dr. Jessie Rollins.  Gallstones     GERD (gastroesophageal reflux disease)     Hiatal hernia 1977    Hypercholesteremia     Hypertension     Ill-defined condition     \"JUST ONE NIGHT I WENT TO STAND UP AND I JUST COULDN'T STAND\"  PT NOT SURE WHY    Leg weakness 12/2009    Dr. Marcello Olivas. Dr. Ata Sy.  Lower extremity venous stasis     Dr. Brent Muller    Lumbar spinal stenosis 12/2009    L3-5. Dr. Hale March.   PARAPLEGIA SINCE 2012    Lymphedema 12/19/08 Dr. Harley Uribe then Dr. Darwin Manriquez    Memory loss     Menopause     Migraine     Ocular. Dr. Raleigh Gonzalez    NORMA (obstructive sleep apnea) 01/24/07    Dr. Sayra Stack. NO CPAP    Osteoporosis 02/10/09    Dr. Megan Mclean    Pancreatitis 03/2011    Post-menopausal bleeding 07/2016    due to Dr. Al Whitley Jr.    Rotator cuff injury     Left. Dr. Milton Matias; chronic bilateral    Seborrheic keratosis 2005    Dr. Billy Machado 02/05/09    Dr. Enrique Lino.Latus Tremor 2013    Dr. Martinez Almeida.  Unspecified vitamin D deficiency 07/2009    Urinary incontinence 2013    Dr. Washington Rose Venous insufficiency 1998    R>L. with chronic leg edema. Dr. Darwin Manriquez       Past Surgical History:   Procedure Laterality Date    HX HEART CATHETERIZATION  06/2008    Dr. Regulo Kaur. due to abnormal Stress Test    HX MAR AND BSO  09/14/2016    Dr. Chel Land, Armin Hays HYSTEROSCOPIC MYOMECTOMY  07/27/2016    D AND C WITH SYMPHION, RESECTION OF ENDOMETRIAL TISSUE. benign. due to postmenopausal bleeding. Dr. Loretta Moreno. Family History:   Problem Relation Age of Onset    Hypertension Mother     Arthritis-osteo Mother     Dementia Mother     Cancer Father      liver    Heart Attack Father     Other Father      kidney stones    Stroke Maternal Grandmother     Hypertension Sister    [place].Latus Arthritis-osteo Sister     Diabetes Paternal Aunt     Anesth Problems Neg Hx        Social History     Social History    Marital status: SINGLE     Spouse name: N/A    Number of children: N/A    Years of education: N/A     Occupational History    Not on file. Social History Main Topics    Smoking status: Former Smoker     Packs/day: 0.25     Years: 0.50     Types: Cigarettes     Quit date: 1/1/1962    Smokeless tobacco: Never Used      Comment: Pt smoked 3-4 cigs x 9 mos.   Pt lived with smoker dad x 40 yrs    Alcohol use No    Drug use: No    Sexual activity: Yes     Partners: Male     Birth control/ protection: Condom     Other Topics Concern    Not on file     Social History Narrative         ALLERGIES: Aristocort [triamcinolone]; Codeine; Lasix [furosemide]; Bupropion; Darvocet a500 [propoxyphene n-acetaminophen]; Influenza virus vaccine whole; Mevacor [lovastatin]; Percocet [oxycodone-acetaminophen]; Rhinocort; and Simvastatin    Review of Systems   Constitutional: Negative for chills, diaphoresis and fever. HENT: Positive for sore throat. Negative for congestion and rhinorrhea. Eyes: Negative for photophobia, redness and visual disturbance. Respiratory: Positive for cough. Negative for apnea and shortness of breath. Cardiovascular: Positive for chest pain. Negative for palpitations and leg swelling. Gastrointestinal: Positive for abdominal pain. Negative for diarrhea, nausea and vomiting. Endocrine: Negative for polyuria. Genitourinary: Negative for difficulty urinating, dysuria, frequency, hematuria, vaginal bleeding and vaginal discharge. Musculoskeletal: Negative for arthralgias, back pain and myalgias. Skin: Negative for color change, pallor, rash and wound. Neurological: Negative for dizziness, syncope, light-headedness and headaches. Psychiatric/Behavioral: Negative for agitation and confusion. The patient is not nervous/anxious. All other systems reviewed and are negative. Vitals:    08/11/17 1100 08/11/17 1115 08/11/17 1145 08/11/17 1318   BP: 121/68 (!) 136/114 112/66 128/68   Pulse: 76 79 80 69   Resp: 17 21 17 15   Temp:       SpO2: 94%   99%   Weight:       Height:                Physical Exam   Constitutional: She appears well-developed and well-nourished. No distress. Morbidly obese elderly female   HENT:   Head: Normocephalic and atraumatic. Eyes: Conjunctivae are normal. No scleral icterus. Neck: No tracheal deviation present. Cardiovascular: Normal rate, regular rhythm and normal heart sounds.   Exam reveals no gallop and no friction rub. No murmur heard. Pulmonary/Chest: Effort normal and breath sounds normal. No respiratory distress. She has no wheezes. She has no rales. She exhibits tenderness (Lower sternal TTP). Abdominal: Soft. She exhibits no distension and no mass. There is tenderness (minimal epigastric TTP). There is no rebound and no guarding. Musculoskeletal: She exhibits no edema or deformity. Neurological: She is alert. Skin: Skin is warm and dry. No rash noted. She is not diaphoretic. No erythema. No pallor. Psychiatric: She has a normal mood and affect. Her behavior is normal.   Nursing note and vitals reviewed. MDM  Number of Diagnoses or Management Options  Abdominal pain, epigastric:   Atypical chest pain:   Cough:   Renal cyst, right x 2:   Sore throat:   Umbilical hernia without obstruction and without gangrene:   Diagnosis management comments: DDx: ACS, PNA, cholecystitis, pancreatitis, hepatitis, pleurisy, costochondritis, viral syndrome    A/P: 65 yo F with extensive medical Hx significant for CAD, cholelithiasis, and pancreatitis presenting for evaluation of CP and ABD pain that began after coughing. Denies any F/C, SOB or N/V/D. Physical exam reveals stable VS, reproducible chest wall and right upper back pain as well as mild epigastric pain without rebound or guarding. EKG unremarkable. Will obtain ACS labs, CMP, lipase, CXR and RUQ US. Symptomatic management with pepcid and tylenol.        Amount and/or Complexity of Data Reviewed  Clinical lab tests: ordered and reviewed  Tests in the radiology section of CPT®: ordered and reviewed  Tests in the medicine section of CPT®: ordered and reviewed  Review and summarize past medical records: yes  Independent visualization of images, tracings, or specimens: yes    Patient Progress  Patient progress: stable    ED Course       Procedures    EKG interpretation: (Preliminary) 08:11  Rhythm: sinus rhythm with marked sinus arrhythmia; and irregular. Rate (approx.): 82; Axis: left axis deviation; NM interval: 180 ms; QRS interval: 108 ms; QT/QTc: 398/464 ms; ST/T wave: no ST elevation or depression; Other findings: abnormal ekg. Written by Danial Schafer, ED Scribe, as dictated by Sree Fernández. Rossy Baron MD.      10:21 AM  Pt re-evaluated, states pain mildly improved after medication. All results reviewed and plan for repeat trop discussed. Given ongoing pain will obtain CT ABD/pelvis and UA. Renee Saldana MD    DISCHARGE NOTE  12:28 PM  The patient has been re-evaluated and is ready for discharge. Repeat POC trop not crossing over, but was < .04. Reviewed available results with patient. Counseled pt on diagnosis and care plan. Pt has expressed understanding, and all questions have been answered. Pt agrees with plan and agrees to F/U as recommended, or return to the ED if their sxs worsen. Discharge instructions have been provided and explained to the pt, along with reasons to return to the ED.   Renee Saldana MD    LABORATORY TESTS:  Recent Results (from the past 12 hour(s))   EKG, 12 LEAD, INITIAL    Collection Time: 08/11/17  8:11 AM   Result Value Ref Range    Ventricular Rate 82 BPM    Atrial Rate 82 BPM    P-R Interval 180 ms    QRS Duration 108 ms    Q-T Interval 398 ms    QTC Calculation (Bezet) 464 ms    Calculated P Axis 21 degrees    Calculated R Axis -3 degrees    Calculated T Axis 15 degrees    Diagnosis       Sinus rhythm with marked sinus arrhythmia  When compared with ECG of 25-JUL-2017 19:46,  premature atrial complexes are no longer present  Confirmed by Marcia Hernández, P.VKayla ([id number]) on 8/11/2017 8:35:48 AM     TROPONIN I    Collection Time: 08/11/17  8:32 AM   Result Value Ref Range    Troponin-I, Qt. <0.04 <4.53 ng/mL   METABOLIC PANEL, COMPREHENSIVE    Collection Time: 08/11/17  8:32 AM   Result Value Ref Range    Sodium 141 136 - 145 mmol/L    Potassium 3.5 3.5 - 5.1 mmol/L    Chloride 104 97 - 108 mmol/L    CO2 32 21 - 32 mmol/L    Anion gap 5 5 - 15 mmol/L    Glucose 91 65 - 100 mg/dL    BUN 9 6 - 20 MG/DL    Creatinine 0.88 0.55 - 1.02 MG/DL    BUN/Creatinine ratio 10 (L) 12 - 20      GFR est AA >60 >60 ml/min/1.73m2    GFR est non-AA >60 >60 ml/min/1.73m2    Calcium 8.4 (L) 8.5 - 10.1 MG/DL    Bilirubin, total 0.3 0.2 - 1.0 MG/DL    ALT (SGPT) 13 12 - 78 U/L    AST (SGOT) 11 (L) 15 - 37 U/L    Alk. phosphatase 80 45 - 117 U/L    Protein, total 6.7 6.4 - 8.2 g/dL    Albumin 3.0 (L) 3.5 - 5.0 g/dL    Globulin 3.7 2.0 - 4.0 g/dL    A-G Ratio 0.8 (L) 1.1 - 2.2     MAGNESIUM    Collection Time: 08/11/17  8:32 AM   Result Value Ref Range    Magnesium 1.9 1.6 - 2.4 mg/dL   LIPASE    Collection Time: 08/11/17  8:32 AM   Result Value Ref Range    Lipase 159 73 - 393 U/L   CBC WITH AUTOMATED DIFF    Collection Time: 08/11/17  8:32 AM   Result Value Ref Range    WBC 5.8 3.6 - 11.0 K/uL    RBC 4.21 3.80 - 5.20 M/uL    HGB 11.7 11.5 - 16.0 g/dL    HCT 38.2 35.0 - 47.0 %    MCV 90.7 80.0 - 99.0 FL    MCH 27.8 26.0 - 34.0 PG    MCHC 30.6 30.0 - 36.5 g/dL    RDW 15.9 (H) 11.5 - 14.5 %    PLATELET 412 051 - 251 K/uL    NEUTROPHILS 49 32 - 75 %    LYMPHOCYTES 38 12 - 49 %    MONOCYTES 5 5 - 13 %    EOSINOPHILS 7 0 - 7 %    BASOPHILS 1 0 - 1 %    ABS. NEUTROPHILS 2.9 1.8 - 8.0 K/UL    ABS. LYMPHOCYTES 2.2 0.8 - 3.5 K/UL    ABS. MONOCYTES 0.3 0.0 - 1.0 K/UL    ABS. EOSINOPHILS 0.4 0.0 - 0.4 K/UL    ABS.  BASOPHILS 0.0 0.0 - 0.1 K/UL   CK W/ REFLX CKMB    Collection Time: 08/11/17  8:32 AM   Result Value Ref Range    CK 80 26 - 192 U/L   URINALYSIS W/MICROSCOPIC    Collection Time: 08/11/17 11:12 AM   Result Value Ref Range    Color YELLOW/STRAW      Appearance HAZY (A) CLEAR      Specific gravity 1.020 1.003 - 1.030      pH (UA) 6.5 5.0 - 8.0      Protein TRACE (A) NEG mg/dL    Glucose NEGATIVE  NEG mg/dL    Ketone NEGATIVE  NEG mg/dL    Blood NEGATIVE  NEG      Urobilinogen 4.0 (H) 0.2 - 1.0 EU/dL    Nitrites NEGATIVE  NEG      Leukocyte Esterase NEGATIVE  NEG      WBC 0-4 0 - 4 /hpf    RBC 0-5 0 - 5 /hpf    Epithelial cells FEW FEW /lpf    Bacteria NEGATIVE  NEG /hpf    Mucus TRACE (A) NEG /lpf   BILIRUBIN, CONFIRM    Collection Time: 08/11/17 11:12 AM   Result Value Ref Range    Bilirubin UA, confirm NEGATIVE  NEG         IMAGING RESULTS:  CT ABD PELV W CONT   Final Result   INDICATION: Diffuse abdominal pain.     COMPARISON: Ultrasound 8/11/2017.     TECHNIQUE: Helical CT of the abdomen  and pelvis  following the uneventful  intravenous administration of nonionic contrast.  Coronal and sagittal reformats  are performed. CT dose reduction was achieved through use of a standardized  protocol tailored for this examination and automatic exposure control for dose  modulation.     FINDINGS:   The visualized lung bases demonstrate no mass or consolidation. The heart size  is normal. There is no pericardial or pleural effusion.     The liver, spleen, pancreas, and adrenal glands are normal. The kidneys are  symmetric without hydronephrosis. There are 2 right kidney cyst, the larger  measuring 2.8 cm. The gall bladder is present  without intra- or extra-hepatic  biliary dilatation.       There are no dilated bowel loops. The appendix is normal.  There are no  enlarged lymph nodes. There is no free fluid or free air. The aorta tapers  without aneurysm. There is aortoiliac atherosclerosis.     The urinary bladder is nondistended. There is no pelvic mass.       There is a tiny fat-containing umbilical hernia. The bony structures are  age-appropriate.     IMPRESSION  IMPRESSION:   There is no acute abnormality in the abdomen or pelvis. Incidental right kidney  cysts. XR CHEST PA LAT   Final Result   INDICATION:  Cough. Chest pain.     COMPARISON: 7/25/2017.     TECHNIQUE: AP semiupright and lateral chest views.     FINDINGS: Cardiac monitoring wires overlie the thorax. The cardiomediastinal  contours are stable. The lungs and pleural spaces are clear.  There is no  pneumothorax. The bones and upper abdomen are stable.     IMPRESSION  IMPRESSION: No acute process. US ABD LTD   Final Result   INDICATION: Right upper abdomen pain.     COMPARISON:  Ultrasound kidney 7/18/2013.     TECHNIQUE:  Limited abdominal ultrasound.     FINDINGS:      Liver: Echogenicity is within normal limits. No focal liver lesion.      Main portal vein flow: Toward the liver.     Fluid: No ascites.     Gallbladder: Mobile gallstones are present No gallbladder wall thickening or  pericholecystic fluid. Negative sonographic Quezada sign.      Bile ducts: There is no intra or extrahepatic biliary ductal dilatation. The  common bile duct measures 3 mm.     Pancreas: Not well seen due to bowel gas.     Kidneys: Right length: 9.5 cm. No hydronephrosis. A cyst measures 2.7 x 3.2 x  2.9 cm.     IMPRESSION  IMPRESSION:   1. Cholelithiasis without biliary duct dilatation or sonographic findings to  suggest acute cholecystitis.     2. Incidental right kidney cyst.        MEDICATIONS GIVEN:  Medications   famotidine (PF) (PEPCID) 20 mg in sodium chloride 0.9 % 10 mL injection (20 mg IntraVENous Given 8/11/17 0952)   acetaminophen (TYLENOL) tablet 1,000 mg (1,000 mg Oral Given 8/11/17 0950)   0.9% sodium chloride infusion (50 mL/hr IntraVENous New Bag 8/11/17 1209)   iopamidol (ISOVUE-370) 76 % injection 100 mL (100 mL IntraVENous Given 8/11/17 1209)   sodium chloride (NS) flush 10 mL (10 mL IntraVENous Given 8/11/17 1209)       IMPRESSION:  1. Atypical chest pain    2. Abdominal pain, epigastric    3. Umbilical hernia without obstruction and without gangrene    4. Renal cyst, right x 2    5. Cough    6. Sore throat        PLAN:  1. Current Discharge Medication List        2.    Follow-up Information     Follow up With Details Comments [facility], DO In 2 days  [address]  [address] [place] [address] [address] [place] [address]  [phone]      [facility] EMERGENCY DEPT  As needed, If symptoms worsen [address] [place] [address]  [phone]        Return to ED if worse     Discharge Note:  1:20 PM  The pt is ready for discharge. The pt's signs, symptoms, diagnosis, and discharge instructions have been discussed and pt has conveyed their understanding. The pt is to follow up as recommended or return to ER should their symptoms worsen. Plan has been discussed and pt is in agreement.

## 2017-08-13 LAB — TROPONIN I BLD-MCNC: <0.04 NG/ML (ref 0–0.08)

## 2017-08-16 ENCOUNTER — TELEPHONE (OUTPATIENT)
Dept: FAMILY MEDICINE CLINIC | Age: 77
End: 2017-08-16

## 2017-08-16 NOTE — TELEPHONE ENCOUNTER
----- Message from [id number] sent at 8/15/2017  4:49 PM EDT -----  Regarding: Pippa Buckley from AT  [employer] is calling to check on the status of forms faxed over on 8/8/17 to discontinue pt's OT. Best contact number [phone]; [phone].

## 2017-08-18 RX ORDER — LORATADINE 10 MG/1
10 TABLET ORAL DAILY
Qty: 90 TAB | Refills: 3 | Status: CANCELLED
Start: 2017-08-18

## 2017-08-18 RX ORDER — MELATONIN
1000 DAILY
Qty: 90 TAB | Refills: 3 | Status: CANCELLED
Start: 2017-08-18

## 2017-08-18 RX ORDER — PANTOPRAZOLE SODIUM 40 MG/1
40 TABLET, DELAYED RELEASE ORAL DAILY
Qty: 90 TAB | Refills: 3 | Status: CANCELLED
Start: 2017-08-18

## 2017-08-30 DIAGNOSIS — Z91.09 ENVIRONMENTAL ALLERGIES: ICD-10-CM

## 2017-08-30 DIAGNOSIS — K21.9 GASTROESOPHAGEAL REFLUX DISEASE, ESOPHAGITIS PRESENCE NOT SPECIFIED: Primary | ICD-10-CM

## 2017-08-30 DIAGNOSIS — E55.9 VITAMIN D DEFICIENCY: ICD-10-CM

## 2017-08-30 RX ORDER — MELATONIN
1000 DAILY
Qty: 90 TAB | Refills: 3
Start: 2017-08-30 | End: 2018-04-05 | Stop reason: ALTCHOICE

## 2017-08-30 RX ORDER — PANTOPRAZOLE SODIUM 40 MG/1
40 TABLET, DELAYED RELEASE ORAL DAILY
Qty: 90 TAB | Refills: 3
Start: 2017-08-30 | End: 2018-04-05 | Stop reason: ALTCHOICE

## 2017-08-30 RX ORDER — LORATADINE 10 MG/1
10 TABLET ORAL DAILY
Qty: 90 TAB | Refills: 3
Start: 2017-08-30

## 2017-09-05 ENCOUNTER — PATIENT OUTREACH (OUTPATIENT)
Dept: FAMILY MEDICINE CLINIC | Age: 77
End: 2017-09-05

## 2017-09-05 NOTE — PROGRESS NOTES
NNTOCED     [facility]-P referral. Seen @ [facility]- ED. [facility] system accessed. ED Provider report reviewed/printed. Record to be forwarded to PCP for review & submit for scanning. Diagnoses:  Left Knee Pain, S/P Fall    Radiology:  Left femur- negative fx/dislocation  Left knee- no fx. +OA. Discharge Plan/Instructions:  - Disposition- [facility]  - Rx- Tramadol 50 mg, #15 tabs, 1 tab q 6-8 hrs pain  - PCP f/u as needed       Spoke with pt & senior living DON \"Sima\"    Goals      Knowledge and adherence to medication plan. 9/6/17- per ED Report Rx for Tramadol 50 mg #15, 1 tab q 6-8 hrs prn pain. Pt unaware of Rx. As per [facility] DON Rx sent to facility pharm \"[facility]\" & expect delivery of Rx today-ID       Schedule PCP Posr ED F/U Appointment            9/6/17- pt reported \"pain not any worse. Just a little swollen\". Pt reported hx of \"arthritis in knee\" & usually has some some discomfort. Pt declined offer to schedule PCp f/u @ this time. Reminded pt her next PCP f/u scheduled for 2/5/2018 @ 9:30 AM. Advised to call office to schedule an earlier appt if needed related to knee. Pt verbalized understanding. Spoke with \"Sima, Director of Nursing @ [facility]\". DON reported pt doing good.  \"Her leg gave out when she was transferring from toilet to \"-ID             NN Plan:  - Next

## 2017-09-26 ENCOUNTER — TELEPHONE (OUTPATIENT)
Dept: FAMILY MEDICINE CLINIC | Age: 77
End: 2017-09-26

## 2017-09-26 NOTE — TELEPHONE ENCOUNTER
Phone call from Christina at [facility] and they would like an okay to open her to PT for evaluation and treatment. She has a small area on her left lower leg that looks like it could turn into a wound. It is at the site of a healed wound and they would like to stop the progression. She also needs help with lower extremity edema and needs to up and ambulating more-they would like PT to work with her. Jey's phone number is [phone]. They will send the order for MD signature if this is ok    Per verbal ok from Dr Page Sides it is fine for them to start PT -they will fax the order for her signature.  Spoke to Christina at [phone]

## 2017-10-03 NOTE — TELEPHONE ENCOUNTER
Writer called [employer]; left message for Christina regarding faxed signed PT order for pt from Dr. Haseeb Woods and if they received said order. Waiting for return phone call.

## 2017-10-27 ENCOUNTER — TELEPHONE (OUTPATIENT)
Dept: FAMILY MEDICINE CLINIC | Age: 77
End: 2017-10-27

## 2017-10-27 NOTE — TELEPHONE ENCOUNTER
Writer called Cory Lyn from [facility] back pt's care. Cory Lyn stated that she needed to know who had pt seen, including Dr. Alex Thakkar, from 04/28/2017-07/27/2017. Writer made Cory Lyn aware that pt no showed to appointment scheduled for 05/23/2017, but did see Dr. Sarah Robison on 07/25/2017 at [id number] [facility] ED for a ADAMSON. Cory Lyn stated that it would not matter then because that is not what they saw her for. And they needed the doctor to fill out a face-to-face form for the reasons why they were doing home care visits.

## 2017-10-27 NOTE — TELEPHONE ENCOUNTER
Please call [facility].  [phone]. Calling about her care and needs to speak with someone ASAP. Didn't want to give me any other info.

## 2017-11-14 ENCOUNTER — HOSPITAL ENCOUNTER (EMERGENCY)
Age: 77
Discharge: HOME | End: 2017-11-14
Attending: EMERGENCY MEDICINE | Admitting: EMERGENCY MEDICINE
Payer: MEDICARE

## 2017-11-14 VITALS
OXYGEN SATURATION: 96 % | WEIGHT: 264.77 LBS | HEART RATE: 66 BPM | RESPIRATION RATE: 14 BRPM | TEMPERATURE: 98.3 F | BODY MASS INDEX: 50.03 KG/M2 | SYSTOLIC BLOOD PRESSURE: 143 MMHG | DIASTOLIC BLOOD PRESSURE: 64 MMHG

## 2017-11-14 DIAGNOSIS — R51.9 CHRONIC NONINTRACTABLE HEADACHE, UNSPECIFIED HEADACHE TYPE: ICD-10-CM

## 2017-11-14 DIAGNOSIS — G89.29 CHRONIC NONINTRACTABLE HEADACHE, UNSPECIFIED HEADACHE TYPE: ICD-10-CM

## 2017-11-14 DIAGNOSIS — B34.9 VIRAL SYNDROME: Primary | ICD-10-CM

## 2017-11-14 LAB
ALBUMIN SERPL-MCNC: 3 G/DL (ref 3.5–5)
ALBUMIN/GLOB SERPL: 0.8 {RATIO} (ref 1.1–2.2)
ALP SERPL-CCNC: 80 U/L (ref 45–117)
ALT SERPL-CCNC: 14 U/L (ref 12–78)
ANION GAP SERPL CALC-SCNC: 7 MMOL/L (ref 5–15)
APPEARANCE UR: CLEAR
AST SERPL-CCNC: 11 U/L (ref 15–37)
ATRIAL RATE: 66 BPM
BACTERIA URNS QL MICRO: NEGATIVE /HPF
BASOPHILS # BLD: 0 K/UL (ref 0–0.1)
BASOPHILS NFR BLD: 1 % (ref 0–1)
BILIRUB SERPL-MCNC: 0.3 MG/DL (ref 0.2–1)
BILIRUB UR QL CFM: NEGATIVE
BUN SERPL-MCNC: 14 MG/DL (ref 6–20)
BUN/CREAT SERPL: 16 (ref 12–20)
CALCIUM SERPL-MCNC: 8.9 MG/DL (ref 8.5–10.1)
CALCULATED P AXIS, ECG09: 28 DEGREES
CALCULATED R AXIS, ECG10: 2 DEGREES
CALCULATED T AXIS, ECG11: 7 DEGREES
CHLORIDE SERPL-SCNC: 106 MMOL/L (ref 97–108)
CO2 SERPL-SCNC: 30 MMOL/L (ref 21–32)
COLOR UR: NORMAL
CREAT SERPL-MCNC: 0.88 MG/DL (ref 0.55–1.02)
DIAGNOSIS, 93000: NORMAL
EOSINOPHIL # BLD: 0.4 K/UL (ref 0–0.4)
EOSINOPHIL NFR BLD: 6 % (ref 0–7)
EPITH CASTS URNS QL MICRO: NORMAL /LPF
ERYTHROCYTE [DISTWIDTH] IN BLOOD BY AUTOMATED COUNT: 15.2 % (ref 11.5–14.5)
GLOBULIN SER CALC-MCNC: 3.9 G/DL (ref 2–4)
GLUCOSE SERPL-MCNC: 99 MG/DL (ref 65–100)
GLUCOSE UR STRIP.AUTO-MCNC: NEGATIVE MG/DL
HCT VFR BLD AUTO: 39.4 % (ref 35–47)
HGB BLD-MCNC: 11.9 G/DL (ref 11.5–16)
HGB UR QL STRIP: NEGATIVE
HYALINE CASTS URNS QL MICRO: NORMAL /LPF (ref 0–5)
INR PPP: 1 (ref 0.9–1.1)
KETONES UR QL STRIP.AUTO: NEGATIVE MG/DL
LEUKOCYTE ESTERASE UR QL STRIP.AUTO: NEGATIVE
LYMPHOCYTES # BLD: 1.9 K/UL (ref 0.8–3.5)
LYMPHOCYTES NFR BLD: 32 % (ref 12–49)
MAGNESIUM SERPL-MCNC: 2.1 MG/DL (ref 1.6–2.4)
MCH RBC QN AUTO: 27.5 PG (ref 26–34)
MCHC RBC AUTO-ENTMCNC: 30.2 G/DL (ref 30–36.5)
MCV RBC AUTO: 91.2 FL (ref 80–99)
MONOCYTES # BLD: 0.5 K/UL (ref 0–1)
MONOCYTES NFR BLD: 8 % (ref 5–13)
NEUTS SEG # BLD: 3.2 K/UL (ref 1.8–8)
NEUTS SEG NFR BLD: 53 % (ref 32–75)
NITRITE UR QL STRIP.AUTO: NEGATIVE
P-R INTERVAL, ECG05: 182 MS
PH UR STRIP: 7.5 [PH] (ref 5–8)
PLATELET # BLD AUTO: 275 K/UL (ref 150–400)
POTASSIUM SERPL-SCNC: 3.7 MMOL/L (ref 3.5–5.1)
PROT SERPL-MCNC: 6.9 G/DL (ref 6.4–8.2)
PROT UR STRIP-MCNC: NEGATIVE MG/DL
PROTHROMBIN TIME: 9.9 SEC (ref 9–11.1)
Q-T INTERVAL, ECG07: 432 MS
QRS DURATION, ECG06: 108 MS
QTC CALCULATION (BEZET), ECG08: 452 MS
RBC # BLD AUTO: 4.32 M/UL (ref 3.8–5.2)
RBC #/AREA URNS HPF: NORMAL /HPF (ref 0–5)
SODIUM SERPL-SCNC: 143 MMOL/L (ref 136–145)
SP GR UR REFRACTOMETRY: 1.02 (ref 1–1.03)
UROBILINOGEN UR QL STRIP.AUTO: 1 EU/DL (ref 0.2–1)
VENTRICULAR RATE, ECG03: 66 BPM
WBC # BLD AUTO: 5.9 K/UL (ref 3.6–11)
WBC URNS QL MICRO: NORMAL /HPF (ref 0–4)

## 2017-11-14 PROCEDURE — 80053 COMPREHEN METABOLIC PANEL: CPT | Performed by: EMERGENCY MEDICINE

## 2017-11-14 PROCEDURE — 77030011943

## 2017-11-14 PROCEDURE — 99285 EMERGENCY DEPT VISIT HI MDM: CPT

## 2017-11-14 PROCEDURE — 81001 URINALYSIS AUTO W/SCOPE: CPT | Performed by: EMERGENCY MEDICINE

## 2017-11-14 PROCEDURE — 85610 PROTHROMBIN TIME: CPT | Performed by: EMERGENCY MEDICINE

## 2017-11-14 PROCEDURE — 96360 HYDRATION IV INFUSION INIT: CPT

## 2017-11-14 PROCEDURE — 77030038269 HC DRN EXT URIN PURWCK BARD -A

## 2017-11-14 PROCEDURE — 36415 COLL VENOUS BLD VENIPUNCTURE: CPT | Performed by: EMERGENCY MEDICINE

## 2017-11-14 PROCEDURE — 83735 ASSAY OF MAGNESIUM: CPT | Performed by: EMERGENCY MEDICINE

## 2017-11-14 PROCEDURE — 93005 ELECTROCARDIOGRAM TRACING: CPT

## 2017-11-14 PROCEDURE — 74011250636 HC RX REV CODE- 250/636: Performed by: EMERGENCY MEDICINE

## 2017-11-14 PROCEDURE — 85025 COMPLETE CBC W/AUTO DIFF WBC: CPT | Performed by: EMERGENCY MEDICINE

## 2017-11-14 PROCEDURE — 51701 INSERT BLADDER CATHETER: CPT

## 2017-11-14 RX ORDER — SODIUM CHLORIDE 0.9 % (FLUSH) 0.9 %
5-10 SYRINGE (ML) INJECTION EVERY 8 HOURS
Status: DISCONTINUED | OUTPATIENT
Start: 2017-11-14 | End: 2017-11-14 | Stop reason: HOSPADM

## 2017-11-14 RX ORDER — SODIUM CHLORIDE 0.9 % (FLUSH) 0.9 %
5-10 SYRINGE (ML) INJECTION AS NEEDED
Status: DISCONTINUED | OUTPATIENT
Start: 2017-11-14 | End: 2017-11-14 | Stop reason: HOSPADM

## 2017-11-14 RX ADMIN — SODIUM CHLORIDE 500 ML: 900 INJECTION, SOLUTION INTRAVENOUS at 10:45

## 2017-11-14 NOTE — DISCHARGE INSTRUCTIONS

## 2017-11-14 NOTE — ED TRIAGE NOTES
Pt arrives via EMS from [facility] for generalized not feeling well. Pt reports headache x couple of days with runny nose EMS reports staff reported fever 102 x couple of days ago. Pt also reports some leg pain and nausea. Pt denies having received Flu shot this year. Pt alert at times confused. Skin warm dry intact Pt with documented hx of Dementia however per EMS pt denies hx of such.      Pt placed on stretcher in [address] on monitor x 3 Updated on plan with pending evaluation by MD.

## 2017-11-14 NOTE — ED NOTES
Pt discharged by Dr Aidan Hassan. Pt provided with discharge instructions Rx and instructions on follow up care. Pt out of ED via stretcher accompanied by [facility] staff.

## 2017-11-14 NOTE — ED NOTES
Report called to Debbi at [facility] for pt discharge report.     Spoke with Melissa Gooden at [facility] for discharge transportation

## 2017-11-14 NOTE — ED PROVIDER NOTES
[address]  EMERGENCY DEPARTMENT HISTORY AND PHYSICAL EXAM       Date of Service: 11/14/2017   Patient Name: Lisa Carter   YOB: 1940  Medical Record Number: [id number]    History of Presenting Illness     Chief Complaint   Patient presents with    Generalized Body Aches     Comes from [facility] for generalized aches, headache, leg pain, runny nose and per EMS fever 102 couple days ago        History Provided By:  Patient and spouse    Additional History:   Lisa Carter is a 76 y.o. female with known pmhx significant for CAD, chronic headache, and fibromyalgia who presents via EMS to the ED with cc of generalized body aches, rhinorrhea, leg pain, and fever (102.0F) x past couple of days. Pt allison any associated symptoms. Pt presents from a nursing home, and has not received any medication for her symptoms. Pt states that her symptoms are mild to moderate in severity, non-radiating, and denies any exacerbating or alleviating factors. She specifically denies any chills, nausea, vomiting, chest pain, shortness of breath, rash, diarrhea, sweating or weight loss. Social Hx: (-) tobacco (former), (-) EtOH,  (-) Illicit Drugs    There are no other complaints, changes or physical findings at this time. Primary Care Provider: Yee Navarro DO     Past History     Past Medical History:   Past Medical History:   Diagnosis Date    Abnormal cardiovascular stress test 04/18/08    Dr. Daniela High    Allergy, unspecified not elsewhere classified     Bell's palsy 02/2005    Left. Dr. Ratna Huertas CAD (coronary artery disease)     Dr. Татьяна Lomas. Dr. Leola Tracy Chest pain 2002    Dr. Zach Cox Chronic headache 2013    due to depression. Dr. Akila Bethea.  Chronic pain     LEGS AND FEET; BACK UNDER RIGHT SHOULDER    Congenital eye disorder     Lazy eyes.   Dr. Talia Harden Benedict.  Depression     Dr. Sherren Mor (psychologist). Dr. Brendan Pond. Dr. Rock Peres.  DJD (degenerative joint disease)     knees, low back. Dr. Jose Garcia. Dr. Amol Schafer. Dr. Regina Wood Gait instability 02/10/09    Dr. Glenis Ribera. Dr. Brendan Pond.  Gallstones     GERD (gastroesophageal reflux disease)     Hiatal hernia     Hypercholesteremia     Hypertension     Ill-defined condition     \"JUST ONE NIGHT I WENT TO STAND UP AND I JUST COULDN'T STAND\"  PT NOT SURE WHY    Leg weakness 2009    Dr. Gonzalo Azar. Dr. Lissette Barry.  Lower extremity venous stasis     Dr. Judy Fonseca    Lumbar spinal stenosis 2009    L3-5. Dr. Talon Montaño. PARAPLEGIA SINCE     Lymphedema 08    Dr. Yazmin Stallworth then Dr. Rafi Moeller    Memory loss     Menopause     Migraine     Ocular. Dr. Rodrigo Guzman    NORMA (obstructive sleep apnea) 07    Dr. Neville Hays. NO CPAP    Osteoporosis 02/10/09    Dr. Virginia Wright    Pancreatitis 2011    Post-menopausal bleeding 2016    due to Dr. Arianna Newton Jr.    Rotator cuff injury     Left. Dr. Howard Conner; chronic bilateral    Seborrheic keratosis     Dr. Tay Ortiz 09    Dr. Carlo Aguero   [address] [place] [address] Shadi Tremor     Dr. Brendan Pond.  Unspecified vitamin D deficiency 2009    Urinary incontinence     Dr. Beatriz Ortez Venous insufficiency 1998    R>L. with chronic leg edema. Dr. Rafi Moeller        Past Surgical History:   Past Surgical History:   Procedure Laterality Date    HX HEART CATHETERIZATION  2008    Dr. Adolph Mortimer. due to abnormal Stress Test    HX MAR AND BSO  2016    Fabio Stark HYSTEROSCOPIC MYOMECTOMY  2016    D AND C WITH SYMPHION, RESECTION OF ENDOMETRIAL TISSUE. benign. due to postmenopausal bleeding. Dr. Taina Shaw.         Family History:   Family History   Problem Relation Age of Onset    Hypertension Mother     Arthritis-osteo Mother     Dementia Mother     Cancer Father      liver    Heart Attack Father     Other Father      kidney stones    Stroke Maternal Grandmother     Hypertension Sister    Jef Bustillos Arthritis-osteo Sister     Diabetes Paternal Aunt     Anesth Problems Neg Hx         Social History:   Social History   Substance Use Topics    Smoking status: Former Smoker     Packs/day: 0.25     Years: 0.50     Types: Cigarettes     Quit date: 1/1/1962    Smokeless tobacco: Never Used      Comment: Pt smoked 3-4 cigs x 9 mos. Pt lived with smoker dad x 40 yrs    Alcohol use No        Allergies: Allergies   Allergen Reactions    Aristocort [Triamcinolone] Hives     Aristocort shot    Codeine Itching    Lasix [Furosemide] Itching    Bupropion Other (comments)     Severe headache    Darvocet A500 [Propoxyphene N-Acetaminophen] Other (comments)     headache    Influenza Virus Vaccine Whole Other (comments)     pharyngitis    Mevacor [Lovastatin] Other (comments)     myalgias    Percocet [Oxycodone-Acetaminophen] Itching     Worse headache    Rhinocort Other (comments)     nosebleed    Simvastatin Other (comments)     myalgias         Review of Systems   Review of Systems   Constitutional: Positive for fever. Negative for activity change, appetite change, chills, fatigue and unexpected weight change. HENT: Positive for rhinorrhea. Negative for congestion, hearing loss, sneezing and voice change. Eyes: Negative. Negative for pain and visual disturbance. Respiratory: Negative. Negative for apnea, cough, choking, chest tightness and shortness of breath. Cardiovascular: Negative. Negative for chest pain and palpitations. Gastrointestinal: Negative. Negative for abdominal distention, abdominal pain, blood in stool, diarrhea, nausea and vomiting. Genitourinary: Negative. Negative for difficulty urinating, flank pain, frequency and urgency.         No discharge   Musculoskeletal: Positive for myalgias. Negative for arthralgias, back pain and neck stiffness. Skin: Negative. Negative for color change and rash. Neurological: Negative. Negative for dizziness, seizures, syncope, speech difficulty, weakness, numbness and headaches. Hematological: Negative for adenopathy. Psychiatric/Behavioral: Negative. Negative for agitation, behavioral problems, dysphoric mood and suicidal ideas. The patient is not nervous/anxious. Physical Exam  Physical Exam   Constitutional: She is oriented to person, place, and time. She appears well-developed and well-nourished. No distress. Smells of urine   HENT:   Head: Normocephalic and atraumatic. Mouth/Throat: Oropharynx is clear and moist. No oropharyngeal exudate. Eyes: Conjunctivae and EOM are normal. Pupils are equal, round, and reactive to light. Right eye exhibits no discharge. Left eye exhibits no discharge. Right eye strabismus    Neck: Normal range of motion. Neck supple. Cardiovascular: Normal rate, regular rhythm and intact distal pulses. Exam reveals no gallop and no friction rub. No murmur heard. Pulmonary/Chest: Effort normal and breath sounds normal. No respiratory distress. She has no wheezes. She has no rales. She exhibits no tenderness. Abdominal: Soft. Bowel sounds are normal. She exhibits no distension and no mass. There is no tenderness. There is no rebound and no guarding. Musculoskeletal: Normal range of motion. She exhibits no edema. Atrophy of BLE   Lymphadenopathy:     She has no cervical adenopathy. Neurological: She is alert and oriented to person, place, and time. No cranial nerve deficit. Coordination normal.   Skin: Skin is warm and dry. No rash noted. No erythema. Psychiatric: She has a normal mood and affect. Nursing note and vitals reviewed. Medical Decision Making   I am the first provider for this patient.      I reviewed the vital signs, available nursing notes, past medical history, past surgical history, family history and social history. Old Medical Records: Old medical records. Previous electrocardiograms. Nursing notes. Provider Notes:       ED Course:  10:10 AM   Initial assessment performed. The patients presenting problems have been discussed, and they are in agreement with the care plan formulated and outlined with them. I have encouraged them to ask questions as they arise throughout their visit. Progress Notes:   11:42 AM  The patient states that their symptoms have resolved and they feel much better. There are no other new complaints at this time. Her questions have been answered. We are awaiting final results and those will be reviewed with them when they become available. Diagnostic Study Results   Labs -  Recent Results (from the past 12 hour(s))   EKG, 12 LEAD, INITIAL    Collection Time: 11/14/17 10:19 AM   Result Value Ref Range    Ventricular Rate 66 BPM    Atrial Rate 66 BPM    P-R Interval 182 ms    QRS Duration 108 ms    Q-T Interval 432 ms    QTC Calculation (Bezet) 452 ms    Calculated P Axis 28 degrees    Calculated R Axis 2 degrees    Calculated T Axis 7 degrees    Diagnosis       Normal sinus rhythm with sinus arrhythmia  Nonspecific T wave abnormality  Abnormal ECG  When compared with ECG of 11-AUG-2017 08:11,  No significant change was found     CBC WITH AUTOMATED DIFF    Collection Time: 11/14/17 10:27 AM   Result Value Ref Range    WBC 5.9 3.6 - 11.0 K/uL    RBC 4.32 3.80 - 5.20 M/uL    HGB 11.9 11.5 - 16.0 g/dL    HCT 39.4 35.0 - 47.0 %    MCV 91.2 80.0 - 99.0 FL    MCH 27.5 26.0 - 34.0 PG    MCHC 30.2 30.0 - 36.5 g/dL    RDW 15.2 (H) 11.5 - 14.5 %    PLATELET 636 745 - 225 K/uL    NEUTROPHILS 53 32 - 75 %    LYMPHOCYTES 32 12 - 49 %    MONOCYTES 8 5 - 13 %    EOSINOPHILS 6 0 - 7 %    BASOPHILS 1 0 - 1 %    ABS. NEUTROPHILS 3.2 1.8 - 8.0 K/UL    ABS. LYMPHOCYTES 1.9 0.8 - 3.5 K/UL    ABS.  MONOCYTES 0.5 0.0 - 1.0 K/UL ABS. EOSINOPHILS 0.4 0.0 - 0.4 K/UL    ABS. BASOPHILS 0.0 0.0 - 0.1 K/UL   METABOLIC PANEL, COMPREHENSIVE    Collection Time: 11/14/17 10:27 AM   Result Value Ref Range    Sodium 143 136 - 145 mmol/L    Potassium 3.7 3.5 - 5.1 mmol/L    Chloride 106 97 - 108 mmol/L    CO2 30 21 - 32 mmol/L    Anion gap 7 5 - 15 mmol/L    Glucose 99 65 - 100 mg/dL    BUN 14 6 - 20 MG/DL    Creatinine 0.88 0.55 - 1.02 MG/DL    BUN/Creatinine ratio 16 12 - 20      GFR est AA >60 >60 ml/min/1.73m2    GFR est non-AA >60 >60 ml/min/1.73m2    Calcium 8.9 8.5 - 10.1 MG/DL    Bilirubin, total 0.3 0.2 - 1.0 MG/DL    ALT (SGPT) 14 12 - 78 U/L    AST (SGOT) 11 (L) 15 - 37 U/L    Alk. phosphatase 80 45 - 117 U/L    Protein, total 6.9 6.4 - 8.2 g/dL    Albumin 3.0 (L) 3.5 - 5.0 g/dL    Globulin 3.9 2.0 - 4.0 g/dL    A-G Ratio 0.8 (L) 1.1 - 2.2     MAGNESIUM    Collection Time: 11/14/17 10:27 AM   Result Value Ref Range    Magnesium 2.1 1.6 - 2.4 mg/dL   PROTHROMBIN TIME + INR    Collection Time: 11/14/17 10:27 AM   Result Value Ref Range    INR 1.0 0.9 - 1.1      Prothrombin time 9.9 9.0 - 11.1 sec   URINALYSIS W/MICROSCOPIC    Collection Time: 11/14/17 10:27 AM   Result Value Ref Range    Color YELLOW/STRAW      Appearance CLEAR CLEAR      Specific gravity 1.019 1.003 - 1.030      pH (UA) 7.5 5.0 - 8.0      Protein NEGATIVE  NEG mg/dL    Glucose NEGATIVE  NEG mg/dL    Ketone NEGATIVE  NEG mg/dL    Blood NEGATIVE  NEG      Urobilinogen 1.0 0.2 - 1.0 EU/dL    Nitrites NEGATIVE  NEG      Leukocyte Esterase NEGATIVE  NEG      WBC 0-4 0 - 4 /hpf    RBC 0-5 0 - 5 /hpf    Epithelial cells FEW FEW /lpf    Bacteria NEGATIVE  NEG /hpf    Hyaline cast 0-2 0 - 5 /lpf   BILIRUBIN, CONFIRM    Collection Time: 11/14/17 10:27 AM   Result Value Ref Range    Bilirubin UA, confirm NEGATIVE  NEG         Vital Signs-Reviewed the patient's vital signs.    Patient Vitals for the past 12 hrs:   Temp Pulse Resp BP SpO2   11/14/17 1100 - 66 14 143/64 96 %   11/14/17 1013 98.3 °F (36.8 °C) 71 24 128/67 99 %       Medications Given in the ED:  Medications   sodium chloride (NS) flush 5-10 mL (not administered)   sodium chloride (NS) flush 5-10 mL (not administered)   sodium chloride 0.9 % bolus infusion 500 mL (0 mL IntraVENous IV Completed 11/14/17 1145)       EKG interpretation: (Preliminary) 10:19 AM  Rhythm: Normal sinus rhythm with sinus arrhythmia; and regular . Rate (approx.): 66; Axis: normal; ID interval: normal; QRS interval: normal ; ST/T wave: normal  Written by Richard Sparks, ED Scribe, as dictated by Alisia Russ. Donya Low MD    Diagnosis   Clinical Impression:   1. Viral syndrome    2. Chronic nonintractable headache, unspecified headache type         Plan:  1:   Follow-up Information     Follow up With Details Comments [facility], DO Call in 2 days As needed, If symptoms worsen 14 [address]  [phone]          2: No discharge medications. Return to ED if Worse    Disposition Note:  11:46 AM  Patient's results have been reviewed with them. Patient and/or family have verbally conveyed their understanding and agreement of the patient's signs, symptoms, diagnosis, treatment and prognosis and additionally agree to follow up as recommended or return to the Emergency Room should their condition change prior to follow-up. Discharge instructions have also been provided to the patient with some educational information regarding their diagnosis as well a list of reasons why they would want to return to the ER prior to their follow-up appointment should their condition change.   _______________________________   Attestations: This note is prepared by Richard Sparks, acting as Scribe for Alisia Russ. Donya Low, [facility] Donya Low MD: The scribe's documentation has been prepared under my direction and personally reviewed by me in its entirety.  I confirm that the note above accurately reflects all work, treatment, procedures, and medical decision making performed by me.   _______________________________

## 2017-12-05 ENCOUNTER — OFFICE VISIT (OUTPATIENT)
Dept: FAMILY MEDICINE CLINIC | Age: 77
End: 2017-12-05

## 2017-12-05 VITALS
HEIGHT: 61 IN | OXYGEN SATURATION: 97 % | RESPIRATION RATE: 16 BRPM | TEMPERATURE: 98.4 F | SYSTOLIC BLOOD PRESSURE: 107 MMHG | HEART RATE: 68 BPM | DIASTOLIC BLOOD PRESSURE: 73 MMHG

## 2017-12-05 DIAGNOSIS — G44.229 CHRONIC TENSION-TYPE HEADACHE, NOT INTRACTABLE: ICD-10-CM

## 2017-12-05 DIAGNOSIS — G89.29 CHRONIC PAIN OF LEFT KNEE: ICD-10-CM

## 2017-12-05 DIAGNOSIS — Z78.9 STATIN INTOLERANCE: ICD-10-CM

## 2017-12-05 DIAGNOSIS — M25.562 CHRONIC PAIN OF LEFT KNEE: ICD-10-CM

## 2017-12-05 DIAGNOSIS — R25.1 TREMOR: ICD-10-CM

## 2017-12-05 DIAGNOSIS — G30.1 SENILE DEMENTIA OF ALZHEIMER'S TYPE (HCC): Chronic | ICD-10-CM

## 2017-12-05 DIAGNOSIS — R07.89 OTHER CHEST PAIN: ICD-10-CM

## 2017-12-05 DIAGNOSIS — I10 ESSENTIAL HYPERTENSION: Primary | ICD-10-CM

## 2017-12-05 DIAGNOSIS — J98.8 VIRAL RESPIRATORY ILLNESS: ICD-10-CM

## 2017-12-05 DIAGNOSIS — R29.898 WEAKNESS OF BOTH LOWER EXTREMITIES: ICD-10-CM

## 2017-12-05 DIAGNOSIS — J30.2 CHRONIC SEASONAL ALLERGIC RHINITIS DUE TO OTHER ALLERGEN: ICD-10-CM

## 2017-12-05 DIAGNOSIS — M48.061 SPINAL STENOSIS OF LUMBAR REGION, UNSPECIFIED WHETHER NEUROGENIC CLAUDICATION PRESENT: ICD-10-CM

## 2017-12-05 DIAGNOSIS — E78.00 HYPERCHOLESTEREMIA: ICD-10-CM

## 2017-12-05 DIAGNOSIS — R92.8 ABNORMAL MAMMOGRAM OF LEFT BREAST: ICD-10-CM

## 2017-12-05 DIAGNOSIS — B97.89 VIRAL RESPIRATORY ILLNESS: ICD-10-CM

## 2017-12-05 DIAGNOSIS — R26.81 GAIT INSTABILITY: ICD-10-CM

## 2017-12-05 DIAGNOSIS — I87.8 LOWER EXTREMITY VENOUS STASIS: ICD-10-CM

## 2017-12-05 DIAGNOSIS — F41.8 DEPRESSION WITH ANXIETY: ICD-10-CM

## 2017-12-05 DIAGNOSIS — F02.80 SENILE DEMENTIA OF ALZHEIMER'S TYPE (HCC): Chronic | ICD-10-CM

## 2017-12-05 DIAGNOSIS — I25.10 CORONARY ARTERY DISEASE INVOLVING NATIVE CORONARY ARTERY OF NATIVE HEART WITHOUT ANGINA PECTORIS: ICD-10-CM

## 2017-12-05 PROBLEM — J30.9 ALLERGIC RHINITIS DUE TO ALLERGEN: Status: ACTIVE | Noted: 2017-12-05

## 2017-12-05 RX ORDER — TRAMADOL HYDROCHLORIDE 50 MG/1
TABLET ORAL
COMMUNITY
Start: 2017-11-10 | End: 2018-04-05 | Stop reason: ALTCHOICE

## 2017-12-05 RX ORDER — MONTELUKAST SODIUM 10 MG/1
TABLET ORAL
COMMUNITY
Start: 2017-11-22

## 2017-12-05 RX ORDER — SERTRALINE HYDROCHLORIDE 50 MG/1
50 TABLET, FILM COATED ORAL DAILY
Qty: 30 TAB | Refills: 2 | Status: SHIPPED | OUTPATIENT
Start: 2017-12-05 | End: 2018-03-15 | Stop reason: SDUPTHER

## 2017-12-05 NOTE — PROGRESS NOTES
HISTORY OF PRESENT ILLNESS  Trung Rogers is a 76 y.o. female presents with Headache (Patient is here for a follow up visit from the ER regarding a sharp pain that was on the side of her head. Patient stated that she is still experiencing some dull pain on Left side of the frontal lobe.); Shoulder Pain (Patient stated that she is having pain on the right side of her shoulder ); ED Follow-up; Labs; Depression; and Blood Pressure Check    Agree with nurse note. At her last visit, we gave her several referrals. My nurse, Adela Lima helped her to get appts but, per chart review, she has cancelled her neuro appts 5x with Dr. Fabio Farooq. She prefers a female neurologist. She resides at [facility] and reports that she had to cancel because once the Nya Patient was broken and another time she was sick. Per chart review, she has been referred to neurology in 2016, 2015, 2013, 2012, twice in 2011, and once in 2009. She saw neurologist, MAUREEN Rivers on 8/4/15. She noted dementia, gait instability, and depression with anxiety. MAUREEN Meraz recommended the pt f/u with Dr. Godfrey Hernández and f/u with her in 1 year.  She saw Dr. Godfrey Hernández on 3/22/16. He noted she was living in a new living facility and transitioning well. He wanted her to continue taking Remeron 15 mg daily although she was steadily gaining weight.  He wanted her to increase activity and exercise. F/U 4 months. She is unsure of why she never followed up with neurology or psychiatry. At her last visit, she had a L temporal headache. She has a L sided headache today. Tylenol helps but she has not taken any today. She has a runny nose and itchy eyes. She applies a warm compress to her eyes prn. Denies blurry or double vision. She has a list of 10 things she wanted to discuss. Depression, dreaming about dead people, and nightmares are 3 of th`e 10. She missed her appointment with psychiatrist, Dr. Godfrey Hernández and denies SI/HI.  She previously took Remeron but currently does not taken medication for depression. She admits that she may be depressed because one of the residents passed away and because she asked the  to remove her negative roommate. She now lives alone in her room and at night she watches tv or does puzzles. Her brother visits her a few times every week and she speaks with her sister on the phone. She went to the ER via squad on 08/11/17 for chest pain, cough, and abdominal pain. CXR and CT scan normal. ABD US showed cholelithiasis and incidental R kidney cyst. Dx'd atypical chest pain, abdominal pain epigasric, umbilical hernia, R renal cyst, cough, and sore throat. Tx'd with Pepcid 20 mg IV, Tylenol 1,000 mg orally, and IVF. She went to [facility] ER on 09/04/17 for L knee pain and fall. Negative L knee and L femur xrays. Dx'd L knee pain and s/p fall. Rx'd Tramadol 50 mg x15 tabs. She tells me her L knee no longer hurts and she thinks that doing exercises helped. She was taken to the ER via squad on 11/14/17 for generalized body aches and fever of 102. Dx'd viral syndrome and chronic nonintractable headache. Tx'd with IVF. Hypertensive pt with CAD, hypercholesterolemia and statin intolerance presents to the office with a BP of 107/73. She has not seen cardiologist, Dr. Mikel Vang since 03/2014. He wanted to see her in 6 months but she did not follow up. Patient denies vision changes, dizziness, chest pain, SOB, or new swelling. She is fasting today for labs. Health Maintenance    She had a mammogram on 05/02/17 at [facility]. A 1.5 cm oval mass was noted in the L breast lateral retroareolar region was indeterminate. US was recommended. there were grouped linearly aligned calcifications in the L retroareolar region are indeterminate. Magnification views are recommended. She is unsure if she ever had additional imaging.      Written by denisse Bourgeois, as dictated by Dr. Amee Lee DO.    ROS    Review of Systems negative except as noted above in HPI. ALLERGIES:    Allergies   Allergen Reactions    Aristocort [Triamcinolone] Hives     Aristocort shot    Codeine Itching    Lasix [Furosemide] Itching    Bupropion Other (comments)     Severe headache    Darvocet A500 [Propoxyphene N-Acetaminophen] Other (comments)     headache    Influenza Virus Vaccine Whole Other (comments)     pharyngitis    Mevacor [Lovastatin] Other (comments)     myalgias    Percocet [Oxycodone-Acetaminophen] Itching     Worse headache    Rhinocort Other (comments)     nosebleed    Simvastatin Other (comments)     myalgias       CURRENT MEDICATIONS:    Outpatient Prescriptions Marked as Taking for the 12/5/17 encounter (Office Visit) with Ld Lal, DO   Medication Sig Dispense Refill    montelukast (SINGULAIR) 10 mg tablet       traMADol (ULTRAM) 50 mg tablet       sertraline (ZOLOFT) 50 mg tablet Take 1 Tab by mouth daily. Indications: ANXIETY WITH DEPRESSION 30 Tab 2    cholecalciferol (VITAMIN D3) 1,000 unit tablet Take 1 Tab by mouth daily. 90 Tab 3    pantoprazole (PROTONIX) 40 mg tablet Take 1 Tab by mouth daily. 90 Tab 3    loratadine (CLARITIN) 10 mg tablet Take 1 Tab by mouth daily. 90 Tab 3    aspirin 81 mg chewable tablet Take 1 Tab by mouth daily. Indications: prevention of cerebrovascular accident 80 Tab 3    ondansetron (ZOFRAN ODT) 8 mg disintegrating tablet Take 1 Tab by mouth every eight (8) hours as needed for Nausea. 10 Tab 0    guaiFENesin-dextromethorphan (SILTUSSIN DM CARMONA)  mg/5 mL liqd Take 10 mL by mouth every four (4) hours as needed. Indications: COLD SYMPTOMS 236 mL 0    MAPAP 325 mg tablet TAKE 2 TABLETS BY MOUTH EVERY 6-8 HOURS AS NEEDED FOR PAIN 90 Tab 0    mirtazapine (REMERON SOL-TAB) 15 mg disintegrating tablet Take 15 mg by mouth nightly.  fluticasone (FLONASE) 50 mcg/actuation nasal spray 2 Sprays by Both Nostrils route daily.  Indications: ALLERGIC RHINITIS 1 Bottle 6    OTHER Please dispense an Ultra light Wheelchair DX.severe osteoarthritis, fibromyalgia, Bell's Palsy, DJD,muscle weakness, lumbar stenosis, knee and shoulder pain 1 Device 0    amlodipine (NORVASC) 2.5 mg tablet Take 1 tablet by mouth daily. 30 tablet 11    bumetanide (BUMEX) 1 mg tablet TAKE 1 TABLET BY MOUTH EVERY DAY 30 tablet 11    potassium chloride SR (KLOR-CON 10) 10 mEq tablet TAKE 1 TABLET BY MOUTH EVERY DAY 30 tablet 11    lisinopril (PRINIVIL, ZESTRIL) 5 mg tablet Take 1 tablet by mouth daily. 30 tablet 11       PAST MEDICAL HISTORY:    Past Medical History:   Diagnosis Date    Abnormal cardiovascular stress test 04/18/08    Dr. Milly Miller    Allergy, unspecified not elsewhere classified     Bell's palsy 02/2005    Left. Dr. Lord Villar CAD (coronary artery disease)     Dr. Marquis Kerr. Dr. Nicolasa Flores Chest pain 2002    Dr. Sangeeta Montaño Chronic headache 2013    due to depression. Dr. Tex Fuentes.  Chronic pain     LEGS AND FEET; BACK UNDER RIGHT SHOULDER    Congenital eye disorder     Lazy eyes. Dr. Cedillo Sole     Dr. Chapin Menjivar.  Depression 2011    Dr. Jd Youngblood (psychologist). Dr. Tex Fuentes. Dr. Elizabeth Ashby.  DJD (degenerative joint disease)     knees, low back. Dr. Linda Reddy. Dr. Tahir Mtz. Dr. Karli Clements Gait instability 02/10/09    Dr. Sweetie Chavez. Dr. Tex Fuentes.  Gallstones     GERD (gastroesophageal reflux disease)     Hiatal hernia 1977    Hypercholesteremia     Hypertension     Ill-defined condition     \"JUST ONE NIGHT I WENT TO STAND UP AND I JUST COULDN'T STAND\"  PT NOT SURE WHY    Leg weakness 12/2009    Dr. Chapin Menjivar. Dr. Lizbeth Faarh.  Lower extremity venous stasis     Dr. Melanie Maciel    Lumbar spinal stenosis 12/2009    L3-5. Dr. Albert Culver.   PARAPLEGIA SINCE 2012    Lymphedema 12/19/08    Dr. Enedelia Sherman then Dr. Bethany Raman loss     Menopause     Migraine     Ocular. Dr. Christian Solomon    NORMA (obstructive sleep apnea) 01/24/07    Dr. Seth Mccall. NO CPAP    Osteoporosis 02/10/09    Dr. Meet Avery    Pancreatitis 03/2011    Post-menopausal bleeding 07/2016    due to Dr. Skip Larkin Jr.    Rotator cuff injury     Left. Dr. Rockwell Left; chronic bilateral    Seborrheic keratosis 2005    Dr. Regalado Bias 02/05/09    Dr. Verona Lozano Tremor 2013    Dr. Kelton Wayne.  Unspecified vitamin D deficiency 07/2009    Urinary incontinence 2013    Dr. Uli Olmedo Venous insufficiency 1998    R>L. with chronic leg edema. Dr. Kamilah Lei       PAST SURGICAL HISTORY:    Past Surgical History:   Procedure Laterality Date    HX HEART CATHETERIZATION  06/2008    Dr. Tamiko Solis. due to abnormal Stress Test    HX MAR AND BSO  09/14/2016    Dr. Stephanie Horner, Brad Sauer HYSTEROSCOPIC MYOMECTOMY  07/27/2016    D AND C WITH SYMPHION, RESECTION OF ENDOMETRIAL TISSUE. benign. due to postmenopausal bleeding. Dr. Lewis Palma. FAMILY HISTORY:    Family History   Problem Relation Age of Onset    Hypertension Mother     Arthritis-osteo Mother     Dementia Mother     Cancer Father      liver    Heart Attack Father     Other Father      kidney stones    Stroke Maternal Grandmother     Hypertension Sister    Vanessa Lozano Arthritis-osteo Sister     Diabetes Paternal Aunt     Anesth Problems Neg Hx        SOCIAL HISTORY:    Social History     Social History    Marital status: SINGLE     Spouse name: N/A    Number of children: N/A    Years of education: N/A     Social History Main Topics    Smoking status: Former Smoker     Packs/day: 0.25     Years: 0.50     Types: Cigarettes     Quit date: 1/1/1962    Smokeless tobacco: Never Used      Comment: Pt smoked 3-4 cigs x 9 mos.   Pt lived with smoker dad x 40 yrs    Alcohol use No    Drug use: No    Sexual activity: Yes     Partners: Male     Birth control/ protection: Condom     Other Topics Concern    None     Social History Narrative       IMMUNIZATIONS:    Immunization History   Administered Date(s) Administered    TB Skin Test (PPD) Intradermal 08/05/2013    TD Vaccine 03/28/2008         PHYSICAL EXAMINATION    Vital Signs    Visit Vitals    /73 (BP 1 Location: Left arm, BP Patient Position: Sitting)    Pulse 68    Temp 98.4 °F (36.9 °C) (Oral)    Resp 16    Ht 5' 1\" (1.549 m)    SpO2 97%       Weight Metrics 12/5/2017 11/14/2017 8/11/2017 8/3/2017 7/25/2017 1/23/2017 9/29/2016   Weight - 264 lb 12.4 oz 273 lb 273 lb 273 lb 273 lb 273 lb   BMI - 50.03 kg/m2 51.58 kg/m2 51.58 kg/m2 51.58 kg/m2 53.32 kg/m2 53.32 kg/m2   Some encounter information is confidential and restricted. Go to Review Flowsheets activity to see all data. General appearance - Well nourished. Well appearing. Well developed. No acute distress. Obese. Head - Normocephalic. Atraumatic. Eyes - pupils equal and reactive. Sclera anicteric. Mildly injected sclera. Laxed EOM noted. Ears - Hearing is grossly normal bilaterally. Nose - normal and patent. No polyps noted. No erythema. No discharge. Mouth - mucous membranes with adequate moisture. Posterior pharynx normal with cobblestone appearance. No erythema, white exudate or obstruction. Neck - supple. Midline trachea. No carotid bruits noted bilaterally. No thyromegaly noted. Chest - clear to auscultation bilaterally anteriorly and posteriorly. No wheezes. No rales or rhonchi. Breath sounds are symmetrical bilaterally. Unlabored respirations. Heart - normal rate. Regular rhythm. Normal S1, S2.  3/6 murmur noted. No rubs, clicks or gallops noted. Abdomen - soft and distended. No masses or organomegaly. No rebound, rigidity or guarding. Bowel sounds normal x 4 quadrants. No tenderness noted. Neurological - awake, alert and oriented to person, place, and time and event.   Cranial nerves II through XII intact. Clear speech. Muscle strength is +5/5 x 4 extremities. Sensation is intact to light touch bilaterally. Wheelchair bound pt. Heme/Lymph - peripheral pulses normal x 2 extremities, difficult to assess in DP due to edema. BL non-pitting +2 leg edema. Tender to touch. Negative Vangie sign. Musculoskeletal - Intact x 4 extremities. Full ROM x 4 extremities. BL LE/knee pain with movement. Back exam - normal range of motion. No pain on palpation of the spinous processes in the cervical, thoracic, lumbar, sacral regions. No CVA tenderness. Skin - no rashes, erythema, ecchymosis, lacerations, abrasions, suspicious moles noted  Psychological -   normal behavior, dress and thought processes. Good insight. Good eye contact. Normal affect. Appropriate mood. Normal speech. DATA REVIEWED    Lab Results   Component Value Date/Time    WBC 5.9 11/14/2017 10:27 AM    Hemoglobin (POC) 12.6 12/26/2009 09:27 PM    HGB 11.9 11/14/2017 10:27 AM    Hematocrit (POC) 37 12/26/2009 09:27 PM    HCT 39.4 11/14/2017 10:27 AM    PLATELET 465 26/52/8730 10:27 AM    MCV 91.2 11/14/2017 10:27 AM     Lab Results   Component Value Date/Time    Sodium 143 11/14/2017 10:27 AM    Potassium 3.7 11/14/2017 10:27 AM    Chloride 106 11/14/2017 10:27 AM    CO2 30 11/14/2017 10:27 AM    Anion gap 7 11/14/2017 10:27 AM    Glucose 99 11/14/2017 10:27 AM    Glucose 78 08/14/2013 06:00 AM    BUN 14 11/14/2017 10:27 AM    Creatinine 0.88 11/14/2017 10:27 AM    BUN/Creatinine ratio 16 11/14/2017 10:27 AM    GFR est AA >60 11/14/2017 10:27 AM    GFR est non-AA >60 11/14/2017 10:27 AM    Calcium 8.9 11/14/2017 10:27 AM    Bilirubin, total 0.3 11/14/2017 10:27 AM    AST (SGOT) 11 11/14/2017 10:27 AM    Alk.  phosphatase 80 11/14/2017 10:27 AM    Protein, total 6.9 11/14/2017 10:27 AM    Albumin 3.0 11/14/2017 10:27 AM    Globulin 3.9 11/14/2017 10:27 AM    A-G Ratio 0.8 11/14/2017 10:27 AM    ALT (SGPT) 14 11/14/2017 10:27 AM     Lab Results   Component Value Date/Time    Cholesterol, total 240 08/14/2013 06:00 AM    HDL Cholesterol 52 08/14/2013 06:00 AM    LDL, calculated 162.6 08/14/2013 06:00 AM    VLDL, calculated 25.4 08/14/2013 06:00 AM    Triglyceride 127 08/14/2013 06:00 AM    CHOL/HDL Ratio 4.6 08/14/2013 06:00 AM     Lab Results   Component Value Date/Time    Vitamin D 25-Hydroxy 24 08/05/2010 03:54 PM    VITAMIN D, 25-HYDROXY 47.2 10/17/2014 10:57 AM       Lab Results   Component Value Date/Time    Hemoglobin A1c 5.9 10/17/2014 10:57 AM    Hemoglobin A1c, External 6.2 06/11/2015     Lab Results   Component Value Date/Time    TSH 1.260 10/17/2014 10:57 AM     Lab Results   Component Value Date/Time    Microalb/Creat ratio (ug/mg creat.) 6.8 01/20/2014 10:22 AM       ASSESSMENT and PLAN      ICD-10-CM ICD-9-CM    1. Essential hypertension I10 401.9    2. Chronic tension-type headache, not intractable G44.229 339.12 REFERRAL TO NEUROLOGY    worse on L frontal today   3. Hypercholesteremia E78.00 272.0    4. Senile dementia of Alzheimer's type G30.1 331.0 REFERRAL TO NEUROLOGY    F02.80 294.10    5. Depression with anxiety F41.8 300.4 sertraline (ZOLOFT) 50 mg tablet    worse due to grieving death of a resident/friend and pt negative roomate moving to a different room   6. Coronary artery disease involving native coronary artery of native heart without angina pectoris I25.10 414.01 REFERRAL TO CARDIOLOGY   7. Abnormal mammogram of left breast R92.8 793.80    8. Lower extremity venous stasis I87.8 459.81 REFERRAL TO CARDIOLOGY   9. Gait instability R26.81 781.2 REFERRAL TO NEUROLOGY   10. Chronic seasonal allergic rhinitis due to other allergen J30.2 477.8 montelukast (SINGULAIR) 10 mg tablet    stable   11. Tremor R25.1 781.0 REFERRAL TO NEUROLOGY    improved per pt   12. Spinal stenosis of lumbar region, unspecified whether neurogenic claudication present M48.061 724.02 traMADol (ULTRAM) 50 mg tablet   13. Weakness of both lower extremities R29.898 729.89 REFERRAL TO NEUROLOGY   14. Statin intolerance Z78.9 995.27    15. Chronic pain of left knee M25.562 719.46     G89.29 338.29     due to OA, improved today   16. Viral respiratory illness J98.8 079.99     B97.89      resolved   17. Other chest pain R07.89 786.59 REFERRAL TO CARDIOLOGY    due to URI vs other, resolved       Discussed the patient's BMI with her. The BMI follow up plan is as follows: I have counseled this patient on diet and exercise regimens. Decrease carbohydrates (white foods, sweet foods, sweet drinks and alcohol), increase green leafy vegetables and protein (lean meats and beans) with each meal.  Avoid fried foods. Eat 3-5 small meals daily. Do not skip meals. Increase water intake. Limit salt. Increase physical activity to 30 minutes daily for health benefit or 60 minutes daily to prevent weight regain, as tolerated. Get 7-8 hours uninterrupted sleep nightly. Chart reviewed and updated. Will obtain med list from [facility] as pt is unsure of which meds she takes. Continue current medications and care. Start Zoloft 50 mg daily. Take Tylenol prn for headache and shoulder pain. Ok to take up 3 g daily. Prescriptions written and sent to pharmacy; medication side effects discussed. Zoloft 50 mg. Pt given sample of Eucerin and if bumps persist then will follow up at next visit. Most recent tests reviewed. Recheck pertinent labs today while fasting. Recent office visit notes from ER reviewed. Referrals given; patient urged to keep appointments with specialists. Neuro. Cardio. Reprinted Optometry referral.   Orpha Fu patient on health concerns:  Headache. Immunizations noted. Offered empathy, support, legitimation, prayers, partnership to patient. Praised patient for progress. Follow-up Disposition:  Return in about 2 months (around 2/5/2018) for referral follow up, bp.     Patient was offered a choice/choices in the treatment plan today. Patient expresses understanding of the plan and agrees with recommendations. Patient declines any additional handouts. Patient is satisfied with previous handouts received from our office    More than 40 mins spent face to face with patient and more than 50% of this time spent in counseling and coordinating care. Written by denisse Resendiz, as dictated by Dr. Shree Johnson DO. Documentation True and Accepted by Murrel Dancer.  Ismale Suero.

## 2017-12-05 NOTE — MR AVS SNAPSHOT
Visit Information Date & Time Provider Department Dept. Phone Encounter #  
 12/5/2017 10:30 AM DO Derrick Nguyen 72 [phone] [id number] Your Appointments 2/5/2018  9:30 AM  
ROUTINE CARE with DO Ji Nguyengetachew 72 (JASMIN Harding) Appt Note: 6 MO F/U for bp, headache, results [address] [facility] [address]  
[phone]  
  
   
 [address] [facility] [address] [facility] [address] Upcoming Health Maintenance Date Due  
 GLAUCOMA SCREENING Q2Y 12/22/2017* MEDICARE YEARLY EXAM 8/4/2018 DTaP/Tdap/Td series (2 - Td) 9/29/2026 *Topic was postponed. The date shown is not the original due date. Allergies as of 12/5/2017  Review Complete On: 12/5/2017 By: Molly Cruz DO Severity Noted Reaction Type Reactions Aristocort [Triamcinolone] High 01/01/1970    Hives Aristocort shot Codeine High 08/10/2009    Itching Lasix [Furosemide] High 08/10/2009    Itching Bupropion  07/25/2011    Other (comments) Severe headache Darvocet A500 [Propoxyphene N-acetaminophen]  09/30/2010    Other (comments)  
 headache Influenza Virus Vaccine Whole  09/30/2009    Other (comments)  
 pharyngitis Mevacor [Lovastatin]  08/10/2009    Other (comments)  
 myalgias Percocet [Oxycodone-acetaminophen]  07/15/2011    Itching Worse headache Rhinocort  08/10/2009    Other (comments)  
 nosebleed Simvastatin  08/10/2009    Other (comments)  
 myalgias Current Immunizations  Reviewed on 12/5/2017 Name Date Influenza Vaccine Split  Deferred (Contraindication) TB Skin Test (PPD) Intradermal 8/5/2013 TD Vaccine 3/28/2008 ZZZ-RETIRED (DO NOT USE) Pneumococcal Vaccine (Unspecified Type)  Deferred (Patient Refused) Reviewed by Molly Cruz DO on 12/5/2017 at 12:34 PM  
You Were Diagnosed With   
  
 Codes Comments Essential hypertension    -  Primary ICD-10-CM: I10 
ICD-9-CM: 401.9 Chronic tension-type headache, not intractable     ICD-10-CM: H27.800 ICD-9-CM: 339.12 worse on L frontal today Hypercholesteremia     ICD-10-CM: E78.00 ICD-9-CM: 272.0 Senile dementia of Alzheimer's type     ICD-10-CM: G30.1, F02.80 ICD-9-CM: 331.0, 294.10 Depression with anxiety     ICD-10-CM: F41.8 ICD-9-CM: 300.4 worse due to grieving death of a resident/friend and pt negative roomate moving to a different room Coronary artery disease involving native coronary artery of native heart without angina pectoris     ICD-10-CM: I25.10 ICD-9-CM: 414.01 Abnormal mammogram of left breast     ICD-10-CM: R92.8 ICD-9-CM: 793.80 Lower extremity venous stasis     ICD-10-CM: I87.8 ICD-9-CM: 459.81 Gait instability     ICD-10-CM: R26.81 
ICD-9-CM: 880. 2 Chronic seasonal allergic rhinitis due to other allergen     ICD-10-CM: J30.2 ICD-9-CM: 477.8 stable Tremor     ICD-10-CM: R25.1 ICD-9-CM: 781.0 improved per pt Spinal stenosis of lumbar region, unspecified whether neurogenic claudication present     ICD-10-CM: M48.061 
ICD-9-CM: 724.02 Weakness of both lower extremities     ICD-10-CM: R29.898 ICD-9-CM: 729.89 Statin intolerance     ICD-10-CM: Z78.9 ICD-9-CM: 995.27 Chronic pain of left knee     ICD-10-CM: M25.562, G89.29 ICD-9-CM: 719.46, 338.29 due to OA, improved today Viral respiratory illness     ICD-10-CM: J98.8, B97.89 ICD-9-CM: 079.99 resolved Other chest pain     ICD-10-CM: R07.89 ICD-9-CM: 786.59 due to URI vs other, resolved Vitals BP Pulse Temp Resp Height(growth percentile) SpO2  
 107/73 (BP 1 Location: Left arm, BP Patient Position: Sitting) 68 98.4 °F (36.9 °C) (Oral) 16 5' 1\" (1.549 m) 97% OB Status Smoking Status Postmenopausal Former Smoker Vitals History Preferred Pharmacy Pharmacy Name Phone [facility] [place] [address] [phone] Your Updated Medication List  
  
   
This list is accurate as of: 12/5/17 12:44 PM.  Always use your most recent med list.  
  
  
  
  
 albuterol 90 mcg/actuation inhaler Commonly known as:  PROVENTIL HFA, VENTOLIN HFA, PROAIR HFA Take 2 Puffs by inhalation every six (6) hours as needed for Wheezing or Shortness of Breath. Indications: BRONCHOSPASM PREVENTION  
  
 amLODIPine 2.5 mg tablet Commonly known as:  Tad Dickson Take 1 tablet by mouth daily. aspirin 81 mg chewable tablet Take 1 Tab by mouth daily. Indications: prevention of cerebrovascular accident  
  
 bumetanide 1 mg tablet Commonly known as:  Royal Hutching TAKE 1 TABLET BY MOUTH EVERY DAY  
  
 cholecalciferol 1,000 unit tablet Commonly known as:  VITAMIN D3 Take 1 Tab by mouth daily. fluticasone 50 mcg/actuation nasal spray Commonly known as:  Maxine Shames 2 Sprays by Both Nostrils route daily. Indications: ALLERGIC RHINITIS  
  
 guaiFENesin-dextromethorphan  mg/5 mL Liqd Commonly known as:  SILTUSSIN DM CARMONA Take 10 mL by mouth every four (4) hours as needed. Indications: COLD SYMPTOMS  
  
 lisinopril 5 mg tablet Commonly known as:  Andrew Julian Take 1 tablet by mouth daily. loratadine 10 mg tablet Commonly known as:  Siria Hunting Take 1 Tab by mouth daily. Mapap 325 mg tablet Generic drug:  acetaminophen TAKE 2 TABLETS BY MOUTH EVERY 6-8 HOURS AS NEEDED FOR PAIN  
  
 mirtazapine 15 mg disintegrating tablet Commonly known as:  REMERON SOL-TAB Take 15 mg by mouth nightly. montelukast 10 mg tablet Commonly known as:  SINGULAIR  
  
 ondansetron 8 mg disintegrating tablet Commonly known as:  ZOFRAN ODT Take 1 Tab by mouth every eight (8) hours as needed for Nausea. OTHER Please dispense an Ultra light Wheelchair DX.severe osteoarthritis, fibromyalgia, Bell's Palsy, DJD,muscle weakness, lumbar stenosis, knee and shoulder pain  
  
 pantoprazole 40 mg tablet Commonly known as:  PROTONIX Take 1 Tab by mouth daily. potassium chloride SR 10 mEq tablet Commonly known as:  KLOR-CON 10  
TAKE 1 TABLET BY MOUTH EVERY DAY  
  
 sertraline 50 mg tablet Commonly known as:  ZOLOFT Take 1 Tab by mouth daily. Indications: ANXIETY WITH DEPRESSION  
  
 traMADol 50 mg tablet Commonly known as:  ULTRAM  
  
  
  
  
Prescriptions Sent to Pharmacy Refills  
 sertraline (ZOLOFT) 50 mg tablet 2 Sig: Take 1 Tab by mouth daily. Indications: ANXIETY WITH DEPRESSION Class: Normal  
 Pharmacy: [address] [place] [address] #: [phone] Route: Oral  
  
We Performed the Following REFERRAL TO CARDIOLOGY [ZPA17 Custom] REFERRAL TO NEUROLOGY [CNV18 Custom] Referral Information Referral ID Referred By Referred To  
  
 [id number] Kwame Richards MD   
   [address] [facility] [address] Phone: [phone] Fax: [phone] Visits Status Start Date End Date 1 New Request 12/5/17 12/5/18 If your referral has a status of pending review or denied, additional information will be sent to support the outcome of this decision. Referral ID Referred By Referred To  
 [id number] Alessio Pitts[address] Phone: [phone] Fax: [phone] Visits Status Start Date End Date 1 New Request 12/5/17 12/5/18 If your referral has a status of pending review or denied, additional information will be sent to support the outcome of this decision. Introducing [facility]! Becca Esquivel introduces [employer] patient portal. Now you can access parts of your medical record, email your doctor's office, and request medication refills online. 1. In your internet browser, go to [email or web address] 2. Click on the First Time User? Click Here link in the Sign In box. You will see the New Member Sign Up page. 3. Enter your [employer] Access Code exactly as it appears below. You will not need to use this code after youve completed the sign-up process. If you do not sign up before the expiration date, you must request a new code. · [employer] Access Code: [email or web address] Expires: 2/12/2018 10:21 AM 
 
4. Enter the last four digits of your Social Security Number (xxxx) and Date of Birth (mm/dd/yyyy) as indicated and click Submit. You will be taken to the next sign-up page. 5. Create a [employer] ID. This will be your [employer]on login ID and cannot be changed, so think of one that is secure and easy to remember. 6. Create a [employer]on password. You can change your password at any time. 7. Enter your Password Reset Question and Answer. This can be used at a later time if you forget your password. 8. Enter your e-mail address. You will receive e-mail notification when new information is available in [address] 9. Click Sign Up. You can now view and download portions of your medical record. 10. Click the Download Summary menu link to download a portable copy of your medical information. If you have questions, please visit the Frequently Asked Questions section of the [employer] website. Remember, [employer] is NOT to be used for urgent needs. For medical emergencies, dial 911. Now available from your iPhone and Android! Please provide this summary of care documentation to your next provider. Your primary care clinician is listed as Janet Garrison. If you have any questions after today's visit, please call [phone].

## 2017-12-05 NOTE — PROGRESS NOTES
Chief Complaint   Patient presents with    Headache     Patient is here for a follow up visit from the ER regarding a sharp pain that was on the side of her head. Patient stated that she is still experiencing some dull pain on Left side of the frontal lobe.  Shoulder Pain     Patient stated that she is having pain on the right side of her shoulder      1. Have you been to the ER, urgent care clinic since your last visit? Hospitalized since your last visit? Yes- Patient was seen at [facility]     2. Have you seen or consulted any other health care providers outside of the [address] [place] [address] since your last visit? Include any pap smears or colon screening.   Yes Patient was recently treated at [facility]     Visit Vitals    /73 (BP 1 Location: Left arm, BP Patient Position: Sitting)    Pulse 68    Temp 98.4 °F (36.9 °C) (Oral)    Resp 16    Ht 5' 1\" (1.549 m)    SpO2 97%

## 2017-12-06 LAB
25(OH)D3+25(OH)D2 SERPL-MCNC: 34.3 NG/ML (ref 30–100)
ALBUMIN SERPL-MCNC: 4 G/DL (ref 3.5–4.8)
ALBUMIN/GLOB SERPL: 1.4 {RATIO} (ref 1.2–2.2)
ALP SERPL-CCNC: 88 IU/L (ref 39–117)
ALT SERPL-CCNC: 11 IU/L (ref 0–32)
AST SERPL-CCNC: 12 IU/L (ref 0–40)
BILIRUB SERPL-MCNC: <0.2 MG/DL (ref 0–1.2)
BUN SERPL-MCNC: 13 MG/DL (ref 8–27)
BUN/CREAT SERPL: 15 (ref 12–28)
CALCIUM SERPL-MCNC: 9.2 MG/DL (ref 8.7–10.3)
CHLORIDE SERPL-SCNC: 97 MMOL/L (ref 96–106)
CHOLEST SERPL-MCNC: 244 MG/DL (ref 100–199)
CO2 SERPL-SCNC: 29 MMOL/L (ref 18–29)
CREAT SERPL-MCNC: 0.87 MG/DL (ref 0.57–1)
ERYTHROCYTE [SEDIMENTATION RATE] IN BLOOD BY WESTERGREN METHOD: 36 MM/HR (ref 0–40)
EST. AVERAGE GLUCOSE BLD GHB EST-MCNC: 131 MG/DL
GFR SERPLBLD CREATININE-BSD FMLA CKD-EPI: 64 ML/MIN/1.73
GFR SERPLBLD CREATININE-BSD FMLA CKD-EPI: 73 ML/MIN/1.73
GLOBULIN SER CALC-MCNC: 2.8 G/DL (ref 1.5–4.5)
GLUCOSE SERPL-MCNC: 83 MG/DL (ref 65–99)
HBA1C MFR BLD: 6.2 % (ref 4.8–5.6)
HDLC SERPL-MCNC: 51 MG/DL
INTERPRETATION, 910389: NORMAL
LDLC SERPL CALC-MCNC: 153 MG/DL (ref 0–99)
MAGNESIUM SERPL-MCNC: 1.8 MG/DL (ref 1.6–2.3)
POTASSIUM SERPL-SCNC: 4 MMOL/L (ref 3.5–5.2)
PROT SERPL-MCNC: 6.8 G/DL (ref 6–8.5)
SODIUM SERPL-SCNC: 141 MMOL/L (ref 134–144)
TRIGL SERPL-MCNC: 199 MG/DL (ref 0–149)
TSH SERPL DL<=0.005 MIU/L-ACNC: 1.44 UIU/ML (ref 0.45–4.5)
VLDLC SERPL CALC-MCNC: 40 MG/DL (ref 5–40)

## 2018-01-02 ENCOUNTER — OFFICE VISIT (OUTPATIENT)
Dept: NEUROLOGY | Age: 78
End: 2018-01-02

## 2018-01-02 VITALS
BODY MASS INDEX: 49.84 KG/M2 | HEIGHT: 61 IN | RESPIRATION RATE: 20 BRPM | HEART RATE: 74 BPM | DIASTOLIC BLOOD PRESSURE: 62 MMHG | WEIGHT: 264 LBS | SYSTOLIC BLOOD PRESSURE: 114 MMHG | OXYGEN SATURATION: 93 %

## 2018-01-02 DIAGNOSIS — R41.3 MEMORY LOSS: Primary | ICD-10-CM

## 2018-01-02 RX ORDER — BUTALBITAL, ACETAMINOPHEN AND CAFFEINE 300; 40; 50 MG/1; MG/1; MG/1
CAPSULE ORAL
COMMUNITY
End: 2018-05-01 | Stop reason: ALTCHOICE

## 2018-01-02 NOTE — PROGRESS NOTES
Neurology Progress Note      HISTORY PROVIDED BY: patient    Chief Complaint:   Chief Complaint   Patient presents with    Memory Loss    Headache    Tremors    Gait Problem    Extremity Weakness      Subjective:    Monroe Mario is a 76 y.o. right handed female who presents in follow up for various issues noted by her PCP - memory loss, HA, tremors, gait issues, and weakness. The patient is unable to tell me why she is here and tells me to refer to Dr. America De Leon note. This is my first visit with the pt. She presents alone from SNF. Pt reports that her main complaint today is her memory. She doesn't believe that this has been going on for very long. In review of records, she was seen by Dr. Guille Swift at [facility], last seen November, 2013 and then by MAUREEN Trent in August, 2015 in the neurology clinic, and had a diagnosis of \"dementia\" at that time. Pt denies family h/o dementia. Pt gives an example of misplacing items, but will find them later. She has been living at the [facility] for 3 years she believes. She tells me that it was not safe for her to stay home alone due to being in a WC. She tells me that she is in a WC due to her knees. Cannot tell me how long she has been in a WC, but believes it has been 5 years, does not walk at all. According to Dr. Malissa Vance note, the pt is in a WC due to spinal stenosis and dementia. Pt is aware of the spinal stenosis, denies having spine surgery. Dr. Guille Swift was also seeing the pt for tremors, but pt denies having tremors to me. Pt reports that she will sometimes has HAs, not a big concern for her because she can take tylenol with relief from HA. States she does not take this very often, it varies. Pt had a CT of head 7/25/17 for left face and head pain - unremarkable study. MRI brain w/wo 9/19/11 for gait abnormality - age related atrophy, o/w unremarkable.    Seen by psychiatry in March, 2016, noted to have depression and anxiety with improvement in cognitive function at that time with MMSE 30/30. Recent labs in Nov, 2017 were unremarkable. Past Medical History:   Diagnosis Date    Abnormal cardiovascular stress test 04/18/08    Dr. Poli Akhtar    Allergy, unspecified not elsewhere classified     Bell's palsy 02/2005    Left. Dr. Nicole Payne CAD (coronary artery disease)     Dr. Lesia Arevalo. Dr. Rosemary Sharma Chest pain 2002    Dr. Janny Villegas Chronic headache 2013    due to depression. Dr. Analy Salazar.  Chronic pain     LEGS AND FEET; BACK UNDER RIGHT SHOULDER    Congenital eye disorder     Lazy eyes. Dr. Zac Bashir.  Depression 2011    Dr. Waqas Griggs (psychologist). Dr. Analy Salazar. Dr. Rene Beltrán.  DJD (degenerative joint disease)     knees, low back. Dr. Jez Fung. Dr. Robbie Calderon. Dr. Marcelo Samano Gait instability 02/10/09    Dr. Adela Palencia. Dr. Analy Salazar.  Gallstones     GERD (gastroesophageal reflux disease)     Hiatal hernia 1977    Hypercholesteremia     Hypertension     Ill-defined condition     \"JUST ONE NIGHT I WENT TO STAND UP AND I JUST COULDN'T STAND\"  PT NOT SURE WHY    Leg weakness 12/2009    Dr. Carmela Bashir. Dr. Charisma Tony.  Lower extremity venous stasis     Dr. Lee Brewster    Lumbar spinal stenosis 12/2009    L3-5. Dr. Thelma Fisher. PARAPLEGIA SINCE 2012    Lymphedema 12/19/08    Dr. Yanelis Sharmaain then Dr. Kb Villar    Memory loss     Menopause     Migraine     Ocular. Dr. Lev Weiss    NORMA (obstructive sleep apnea) 01/24/07    Dr. Arian Miller. NO CPAP    Osteoporosis 02/10/09    Dr. Edelmira Harris    Pancreatitis 03/2011    Post-menopausal bleeding 07/2016    due to Dr. Franko Zapata Jr.    Rotator cuff injury     Left.   Dr. Stephenie Anton; chronic bilateral    Seborrheic keratosis 2005    Dr. Guido Lenz 02/05/09    Dr. Solis Diss Tremor 2013    Dr. Kyle Lin.  Unspecified vitamin D deficiency 07/2009    Urinary incontinence 2013    Dr. Lucia Fitzpatrick Venous insufficiency 1998    R>L. with chronic leg edema. Dr. Yossi Rachel      Past Surgical History:   Procedure Laterality Date    HX HEART CATHETERIZATION  06/2008    Dr. Erin Sofia. due to abnormal Stress Test    HX MAR AND BSO  09/14/2016    Dr. Caffie Blizzard, [facility] HYSTEROSCOPIC MYOMECTOMY  07/27/2016    D AND C WITH SYMPHION, RESECTION OF ENDOMETRIAL TISSUE. benign. due to postmenopausal bleeding. Dr. Freddy Moore. Social History     Social History    Marital status: SINGLE     Spouse name: N/A    Number of children: N/A    Years of education: N/A     Occupational History    Not on file. Social History Main Topics    Smoking status: Former Smoker     Packs/day: 0.25     Years: 0.50     Types: Cigarettes     Quit date: 1/1/1962    Smokeless tobacco: Never Used      Comment: Pt smoked 3-4 cigs x 9 mos. Pt lived with smoker dad x 40 yrs    Alcohol use No    Drug use: No    Sexual activity: Yes     Partners: Male     Birth control/ protection: Condom     Other Topics Concern    Not on file     Social History Narrative     Family History   Problem Relation Age of Onset    Hypertension Mother     Arthritis-osteo Mother     Dementia Mother     Cancer Father      liver    Heart Attack Father     Other Father      kidney stones    Stroke Maternal Grandmother     Hypertension Sister     Arthritis-osteo Sister     Diabetes Paternal Aunt     Anesth Problems Neg Hx          Objective:   Review of Systems   Constitutional: Positive for malaise/fatigue. HENT: Negative. Eyes: Negative. Respiratory: Positive for cough. Snoring, denies apneas, recalls having a PSG in past, but does not recall results. Cardiovascular: Positive for leg swelling. Gastrointestinal: Negative. Genitourinary: Negative.     Musculoskeletal: Positive for falls and myalgias. Skin: Negative. Neurological: Negative. Endo/Heme/Allergies: Negative. Psychiatric/Behavioral: Positive for depression. Allergies   Allergen Reactions    Aristocort [Triamcinolone] Hives     Aristocort shot    Codeine Itching    Lasix [Furosemide] Itching    Bupropion Other (comments)     Severe headache    Darvocet A500 [Propoxyphene N-Acetaminophen] Other (comments)     headache    Influenza Virus Vaccine Whole Other (comments)     pharyngitis    Mevacor [Lovastatin] Other (comments)     myalgias    Percocet [Oxycodone-Acetaminophen] Itching     Worse headache    Rhinocort Other (comments)     nosebleed    Simvastatin Other (comments)     myalgias        Meds:  Outpatient Medications Prior to Visit   Medication Sig Dispense Refill    montelukast (SINGULAIR) 10 mg tablet       traMADol (ULTRAM) 50 mg tablet       sertraline (ZOLOFT) 50 mg tablet Take 1 Tab by mouth daily. Indications: ANXIETY WITH DEPRESSION 30 Tab 2    cholecalciferol (VITAMIN D3) 1,000 unit tablet Take 1 Tab by mouth daily. 90 Tab 3    pantoprazole (PROTONIX) 40 mg tablet Take 1 Tab by mouth daily. 90 Tab 3    loratadine (CLARITIN) 10 mg tablet Take 1 Tab by mouth daily. 90 Tab 3    aspirin 81 mg chewable tablet Take 1 Tab by mouth daily. Indications: prevention of cerebrovascular accident 80 Tab 3    ondansetron (ZOFRAN ODT) 8 mg disintegrating tablet Take 1 Tab by mouth every eight (8) hours as needed for Nausea. 10 Tab 0    albuterol (PROVENTIL HFA, VENTOLIN HFA, PROAIR HFA) 90 mcg/actuation inhaler Take 2 Puffs by inhalation every six (6) hours as needed for Wheezing or Shortness of Breath. Indications: BRONCHOSPASM PREVENTION 1 Inhaler 1    guaiFENesin-dextromethorphan (SILTUSSIN DM CARMONA)  mg/5 mL liqd Take 10 mL by mouth every four (4) hours as needed.  Indications: COLD SYMPTOMS 236 mL 0    MAPAP 325 mg tablet TAKE 2 TABLETS BY MOUTH EVERY 6-8 HOURS AS NEEDED FOR PAIN 90 Tab 0    mirtazapine (REMERON SOL-TAB) 15 mg disintegrating tablet Take 15 mg by mouth nightly.  OTHER Please dispense an Ultra light Wheelchair DX.severe osteoarthritis, fibromyalgia, Bell's Palsy, DJD,muscle weakness, lumbar stenosis, knee and shoulder pain 1 Device 0    amlodipine (NORVASC) 2.5 mg tablet Take 1 tablet by mouth daily. 30 tablet 11    bumetanide (BUMEX) 1 mg tablet TAKE 1 TABLET BY MOUTH EVERY DAY 30 tablet 11    potassium chloride SR (KLOR-CON 10) 10 mEq tablet TAKE 1 TABLET BY MOUTH EVERY DAY 30 tablet 11    lisinopril (PRINIVIL, ZESTRIL) 5 mg tablet Take 1 tablet by mouth daily. 30 tablet 11    fluticasone (FLONASE) 50 mcg/actuation nasal spray 2 Sprays by Both Nostrils route daily. Indications: ALLERGIC RHINITIS 1 Bottle 6     No facility-administered medications prior to visit. Imaging:  MRI Results (most recent):    Results from Hospital Encounter encounter on 09/19/11   MRI BRAIN W AND W/O CONTRAST   Narrative **Final Report**      ICD Codes / Adm. Diagnosis: 724.02  724.02 / SPINAL STENOSIS, LUMBAR REGION    Examination:  MRI BRAIN W AND WO CON  - [id number] - Sep 19 2011  3:58PM  Accession No:  [id number]  Reason:  724.02      REPORT:  INDICATION:  Abnormality of gait. COMPARISON:  CT head 01/13/2011. SEQUENCES:  T1 sagittal images, T2 axial images, flair axial images,   gradient echo axial images,  diffusion axial images,  T1 axial images pre   and post Magnevist contrast, and T1 coronal images post Magnevist contrast.    Multiple MRI images were obtained of the brain in the sagittal, in the axial   and in the coronal planes with the above parameters including images both   pre and post 18 mL of Magnevist contrast.    FINDINGS:  Atrophic change is present. Artifact on the diffusion images   with no restricted diffusion appreciated is seen. An empty sella is   present. No abnormal contrast enhancement or shift to the midline is   evident.   Normal flow voids are present in the basilar and internal carotid   arteries. The paranasal sinuses and mastoid air cells are clear. IMPRESSION:  Atrophic change within normal limits for the patient's age. Empty sella. Signing/Reading Doctor: Elizabeth Murillo ([id number])    Approved: Elizabeth Murillo ([id number])  09/19/2011                                     CT Results (most recent):    Results from [place] encounter on 08/11/17   CT ABD PELV W CONT   Narrative EXAM:  CT ABD PELV W CONT    INDICATION: Diffuse abdominal pain. COMPARISON: Ultrasound 8/11/2017. TECHNIQUE: Helical CT of the abdomen  and pelvis  following the uneventful  intravenous administration of nonionic contrast.  Coronal and sagittal reformats  are performed. CT dose reduction was achieved through use of a standardized  protocol tailored for this examination and automatic exposure control for dose  modulation. FINDINGS:   The visualized lung bases demonstrate no mass or consolidation. The heart size  is normal. There is no pericardial or pleural effusion. The liver, spleen, pancreas, and adrenal glands are normal. The kidneys are  symmetric without hydronephrosis. There are 2 right kidney cyst, the larger  measuring 2.8 cm. The gall bladder is present  without intra- or extra-hepatic  biliary dilatation. There are no dilated bowel loops. The appendix is normal.  There are no  enlarged lymph nodes. There is no free fluid or free air. The aorta tapers  without aneurysm. There is aortoiliac atherosclerosis. The urinary bladder is nondistended. There is no pelvic mass. There is a tiny fat-containing umbilical hernia. The bony structures are  age-appropriate. Impression IMPRESSION:   There is no acute abnormality in the abdomen or pelvis. Incidental right kidney  cysts.          Reviewed records in [facility] and [facility] tab today    Lab Review   Results for orders placed or performed during the hospital encounter of 11/14/17   CBC WITH AUTOMATED DIFF   Result Value Ref Range    WBC 5.9 3.6 - 11.0 K/uL    RBC 4.32 3.80 - 5.20 M/uL    HGB 11.9 11.5 - 16.0 g/dL    HCT 39.4 35.0 - 47.0 %    MCV 91.2 80.0 - 99.0 FL    MCH 27.5 26.0 - 34.0 PG    MCHC 30.2 30.0 - 36.5 g/dL    RDW 15.2 (H) 11.5 - 14.5 %    PLATELET 128 240 - 423 K/uL    NEUTROPHILS 53 32 - 75 %    LYMPHOCYTES 32 12 - 49 %    MONOCYTES 8 5 - 13 %    EOSINOPHILS 6 0 - 7 %    BASOPHILS 1 0 - 1 %    ABS. NEUTROPHILS 3.2 1.8 - 8.0 K/UL    ABS. LYMPHOCYTES 1.9 0.8 - 3.5 K/UL    ABS. MONOCYTES 0.5 0.0 - 1.0 K/UL    ABS. EOSINOPHILS 0.4 0.0 - 0.4 K/UL    ABS. BASOPHILS 0.0 0.0 - 0.1 K/UL   METABOLIC PANEL, COMPREHENSIVE   Result Value Ref Range    Sodium 143 136 - 145 mmol/L    Potassium 3.7 3.5 - 5.1 mmol/L    Chloride 106 97 - 108 mmol/L    CO2 30 21 - 32 mmol/L    Anion gap 7 5 - 15 mmol/L    Glucose 99 65 - 100 mg/dL    BUN 14 6 - 20 MG/DL    Creatinine 0.88 0.55 - 1.02 MG/DL    BUN/Creatinine ratio 16 12 - 20      GFR est AA >60 >60 ml/min/1.73m2    GFR est non-AA >60 >60 ml/min/1.73m2    Calcium 8.9 8.5 - 10.1 MG/DL    Bilirubin, total 0.3 0.2 - 1.0 MG/DL    ALT (SGPT) 14 12 - 78 U/L    AST (SGOT) 11 (L) 15 - 37 U/L    Alk.  phosphatase 80 45 - 117 U/L    Protein, total 6.9 6.4 - 8.2 g/dL    Albumin 3.0 (L) 3.5 - 5.0 g/dL    Globulin 3.9 2.0 - 4.0 g/dL    A-G Ratio 0.8 (L) 1.1 - 2.2     MAGNESIUM   Result Value Ref Range    Magnesium 2.1 1.6 - 2.4 mg/dL   PROTHROMBIN TIME + INR   Result Value Ref Range    INR 1.0 0.9 - 1.1      Prothrombin time 9.9 9.0 - 11.1 sec   URINALYSIS W/MICROSCOPIC   Result Value Ref Range    Color YELLOW/STRAW      Appearance CLEAR CLEAR      Specific gravity 1.019 1.003 - 1.030      pH (UA) 7.5 5.0 - 8.0      Protein NEGATIVE  NEG mg/dL    Glucose NEGATIVE  NEG mg/dL    Ketone NEGATIVE  NEG mg/dL    Blood NEGATIVE  NEG      Urobilinogen 1.0 0.2 - 1.0 EU/dL    Nitrites NEGATIVE  NEG      Leukocyte Esterase NEGATIVE  NEG      WBC 0-4 0 - 4 /hpf    RBC 0-5 0 - 5 /hpf    Epithelial cells FEW FEW /lpf    Bacteria NEGATIVE  NEG /hpf    Hyaline cast 0-2 0 - 5 /lpf   BILIRUBIN, CONFIRM   Result Value Ref Range    Bilirubin UA, confirm NEGATIVE  NEG     EKG, 12 LEAD, INITIAL   Result Value Ref Range    Ventricular Rate 66 BPM    Atrial Rate 66 BPM    P-R Interval 182 ms    QRS Duration 108 ms    Q-T Interval 432 ms    QTC Calculation (Bezet) 452 ms    Calculated P Axis 28 degrees    Calculated R Axis 2 degrees    Calculated T Axis 7 degrees    Diagnosis       Normal sinus rhythm with sinus arrhythmia  Nonspecific T wave abnormality  Confirmed by Eva Burnham ([id number]) on 11/14/2017 8:39:44 PM          Exam:  Visit Vitals    /62    Pulse 74    Resp 20    Ht 5' 1\" (1.549 m)    Wt 119.7 kg (264 lb)    SpO2 93%    BMI 49.88 kg/m2     General:  Alert, cooperative, no distress. Head:  Normocephalic, without obvious abnormality, atraumatic. Respiratory:  Heart:   Non labored breathing  Regular rate and rhythm, no murmurs   Neck:   2+ carotids, no bruits   Extremities: Warm, no cyanosis or edema. Pulses: 2+ radial pulses. Neurologic:  MS: Alert, speech intact. Language intact. Attention and fund of knowledge appropriate. Recent and remote memory intact.   Cranial Nerves:  II: visual fields Full to confrontation   II: pupils Equal, round, reactive to light   II: optic disc    III,VII: ptosis none   III,IV,VI: extraocular muscles  EOMI, right esotropia, no nystagmus or diplopia   V: facial light touch sensation  normal   VII: facial muscle function   symmetric   VIII: hearing intact   IX: soft palate elevation  normal   XI: trapezius strength     XI: sternocleidomastoid strength    XII: tongue  Midline     Motor: normal bulk and tone, no tremor seen, Strength: 5/5 throughout UE, 5-/5 HF, no PD  Sensory: intact to LT, PP  Coordination: FTN and EMI intact  Gait: WC bound  Reflexes: 1+ symmetric  Mini Mental State Exam 1/2/2018   What is the Year 1   What is the Season 1   What is the Date 1   What is the Day 1   What is the Month 1   Where are we State 1   Where are we Country 1   Where are we [place] or [facility] 1   [place] are we Floor 1   Name three objects, then ask the patient to say them 3   Name a pencil 1   Name a watch 1   Have the patient repeat this phrase \"No ifs, ands, or buts\" 1   Three stage command: Take the paper in your right hand 1   Fold the paper in half 1   Put the paper on the floor 1   Some recent data might be hidden              Assessment/Plan   Pt is a 76 y.o. right handed female with various issues noted by her PCP - memory loss, HA, tremors, gait issues, and weakness, with patient only concerned about her memory, with diagnosis of dementia in 2015, but does well on limited MMSE today, with h/o tremors in 2013 - pt currently denies tremors and none seen on exam, not concerned about her headaches that respond to tylenol, and known h/o lumbar spinal stenosis leaving her non-ambulatory and WC bound. MRI brain w/wo 9/19/11 for gait abnormality - age related atrophy, o/w unremarkable. Exam with diminished reflexes throughout, mild weakness in LE, WC bound, does well on limited MMSE. May have mild or early dementia, but unusual that her MMSE exam had improved over time and has not declined significantly since 2013. May have had a pseudodementia related to mood disorder. Will continue to monitor over time and hold off on starting dementia meds for now. Recommend completing memory labs, B12/MMA at next lab draw with PCP. F/u in clinic in six months, instructed to call in the interim if needed. ICD-10-CM ICD-9-CM    1. Memory loss R41.3 780.93        Signed:   Deneen Ortez MD  1/2/2018

## 2018-01-02 NOTE — PATIENT INSTRUCTIONS
[facility]   Statement to Patients  April 1, 2014      In an effort to ensure the large volume of patient prescription refills is processed in the most efficient and expeditious manner, we are asking our patients to assist us by calling your Pharmacy for all prescription refills, this will include also your  Mail Order Pharmacy. The pharmacy will contact our office electronically to continue the refill process. Please do not wait until the last minute to call your pharmacy. We need at least 48 hours (2days) to fill prescriptions. We also encourage you to call your pharmacy before going to  your prescription to make sure it is ready. With regard to controlled substance prescription refill requests (narcotic refills) that need to be picked up at our office, we ask your cooperation by providing us with at least 72 hours (3days) notice that you will need a refill. We will not refill narcotic prescription refill requests after 4:00pm on any weekday, Monday through Thursday, or after 2:00pm on Fridays, or on the weekends. We encourage everyone to explore another way of getting your prescription refill request processed using [employer], our patient web portal through our electronic medical record system. [employer] is an efficient and effective way to communicate your medication request directly to the office and  downloadable as an zev on your smart phone . [employer] also features a review functionality that allows you to view your medication list as well as leave messages for your physician. Are you ready to get connected? If so please review the attatched instructions or speak to any of our staff to get you set up right away! Thank you so much for your cooperation. Should you have any questions please contact our Practice Administrator.     The Physicians and Staff,  [facility]           Please bring all medication bottles, including vitamins, supplements and any over-the-counter medications, to your next office visit.

## 2018-01-02 NOTE — MR AVS SNAPSHOT
Visit Information Date & Time Provider Department Dept. Phone Encounter #  
 1/2/2018  9:40 AM MD Moncho Garcia [facility] [phone] Follow-up Instructions Return in about 6 months (around 7/2/2018). Your Appointments 1/9/2018 10:30 AM  
ESTABLISHED PATIENT with MD Madison Henley [facility]) Appt Note: Keara Emmanuel 32 [address]  
[phone] [address]  
  
    
 2/5/2018  9:30 AM  
ROUTINE CARE with DO Derrick Mcnulty 72 (JASMIN Harding) Appt Note: 6 MO F/U for bp, headache, results [address] Caryle Jungling [address] [facility] [address]  
[phone]  
  
   
 [address] Upcoming Health Maintenance Date Due  
 GLAUCOMA SCREENING Q2Y 4/27/2018* MEDICARE YEARLY EXAM 8/4/2018 DTaP/Tdap/Td series (2 - Td) 9/29/2026 *Topic was postponed. The date shown is not the original due date. Allergies as of 1/2/2018  Review Complete On: 1/2/2018 By: Zi Choudhary LPN Severity Noted Reaction Type Reactions Aristocort [Triamcinolone] High 01/01/1970    Hives Aristocort shot Codeine High 08/10/2009    Itching Lasix [Furosemide] High 08/10/2009    Itching Bupropion  07/25/2011    Other (comments) Severe headache Darvocet A500 [Propoxyphene N-acetaminophen]  09/30/2010    Other (comments)  
 headache Influenza Virus Vaccine Whole  09/30/2009    Other (comments)  
 pharyngitis Mevacor [Lovastatin]  08/10/2009    Other (comments)  
 myalgias Percocet [Oxycodone-acetaminophen]  07/15/2011    Itching Worse headache Rhinocort  08/10/2009    Other (comments)  
 nosebleed Simvastatin  08/10/2009    Other (comments)  
 myalgias Current Immunizations  Reviewed on 12/5/2017 Name Date Influenza Vaccine Split  Deferred (Contraindication) TB Skin Test (PPD) Intradermal 8/5/2013 TD Vaccine 3/28/2008 ZZZ-RETIRED (DO NOT USE) Pneumococcal Vaccine (Unspecified Type)  Deferred (Patient Refused) Not reviewed this visit You Were Diagnosed With   
  
 Codes Comments Memory loss    -  Primary ICD-10-CM: R41.3 ICD-9-CM: 780.93 Vitals BP Pulse Resp Height(growth percentile) Weight(growth percentile) SpO2  
 114/62 74 20 5' 1\" (1.549 m) 264 lb (119.7 kg) 93% BMI OB Status Smoking Status 49.88 kg/m2 Postmenopausal Former Smoker Vitals History BMI and BSA Data Body Mass Index Body Surface Area  
 49.88 kg/m 2 2.27 m 2 Preferred Pharmacy Pharmacy Name Phone [address] [facility], [place] [address] [phone] Your Updated Medication List  
  
   
This list is accurate as of: 1/2/18 10:38 AM.  Always use your most recent med list.  
  
  
  
  
 albuterol 90 mcg/actuation inhaler Commonly known as:  PROVENTIL HFA, VENTOLIN HFA, PROAIR HFA Take 2 Puffs by inhalation every six (6) hours as needed for Wheezing or Shortness of Breath. Indications: BRONCHOSPASM PREVENTION  
  
 amLODIPine 2.5 mg tablet Commonly known as:  Chalino Free Take 1 tablet by mouth daily. aspirin 81 mg chewable tablet Take 1 Tab by mouth daily. Indications: prevention of cerebrovascular accident  
  
 bumetanide 1 mg tablet Commonly known as:  Alleen Maggie TAKE 1 TABLET BY MOUTH EVERY DAY  
  
 cholecalciferol 1,000 unit tablet Commonly known as:  VITAMIN D3 Take 1 Tab by mouth daily. FIORICET -40 mg per capsule Generic drug:  butalbital-acetaminophen-caff Take  by mouth every four (4) hours as needed for Pain. fluticasone 50 mcg/actuation nasal spray Commonly known as:  Burak Garber 2 Sprays by Both Nostrils route daily. Indications: ALLERGIC RHINITIS  
  
 guaiFENesin-dextromethorphan  mg/5 mL Liqd Commonly known as:  SILTUSSIN DM CRAMONA Take 10 mL by mouth every four (4) hours as needed. Indications: COLD SYMPTOMS  
  
 lisinopril 5 mg tablet Commonly known as:  Enrigue Sails Take 1 tablet by mouth daily. loratadine 10 mg tablet Commonly known as:  Sherie Furrow Take 1 Tab by mouth daily. Mapap 325 mg tablet Generic drug:  acetaminophen TAKE 2 TABLETS BY MOUTH EVERY 6-8 HOURS AS NEEDED FOR PAIN  
  
 mirtazapine 15 mg disintegrating tablet Commonly known as:  REMERON SOL-TAB Take 15 mg by mouth nightly. montelukast 10 mg tablet Commonly known as:  SINGULAIR  
  
 ondansetron 8 mg disintegrating tablet Commonly known as:  ZOFRAN ODT Take 1 Tab by mouth every eight (8) hours as needed for Nausea. OTHER Please dispense an Ultra light Wheelchair DX.severe osteoarthritis, fibromyalgia, Bell's Palsy, DJD,muscle weakness, lumbar stenosis, knee and shoulder pain  
  
 pantoprazole 40 mg tablet Commonly known as:  PROTONIX Take 1 Tab by mouth daily. potassium chloride SR 10 mEq tablet Commonly known as:  KLOR-CON 10  
TAKE 1 TABLET BY MOUTH EVERY DAY  
  
 sertraline 50 mg tablet Commonly known as:  ZOLOFT Take 1 Tab by mouth daily. Indications: ANXIETY WITH DEPRESSION  
  
 traMADol 50 mg tablet Commonly known as:  ULTRAM  
  
  
  
  
Follow-up Instructions Return in about 6 months (around 7/2/2018). Patient Instructions PRESCRIPTION REFILL POLICY [facility] Statement to Patients April 1, 2014 In an effort to ensure the large volume of patient prescription refills is processed in the most efficient and expeditious manner, we are asking our patients to assist us by calling your Pharmacy for all prescription refills, this will include also your  Mail Order Pharmacy.  The pharmacy will contact our office electronically to continue the refill process. Please do not wait until the last minute to call your pharmacy. We need at least 48 hours (2days) to fill prescriptions. We also encourage you to call your pharmacy before going to  your prescription to make sure it is ready. With regard to controlled substance prescription refill requests (narcotic refills) that need to be picked up at our office, we ask your cooperation by providing us with at least 72 hours (3days) notice that you will need a refill. We will not refill narcotic prescription refill requests after 4:00pm on any weekday, Monday through Thursday, or after 2:00pm on Fridays, or on the weekends. We encourage everyone to explore another way of getting your prescription refill request processed using edjing, our patient web portal through our electronic medical record system. edjing is an efficient and effective way to communicate your medication request directly to the office and  downloadable as an zev on your smart phone . edjing also features a review functionality that allows you to view your medication list as well as leave messages for your physician. Are you ready to get connected? If so please review the attatched instructions or speak to any of our staff to get you set up right away! Thank you so much for your cooperation. Should you have any questions please contact our Practice Administrator. The Physicians and Staff,  [facility] Please bring all medication bottles, including vitamins, supplements and any over-the-counter medications, to your next office visit. Introducing [facility] [place] [facility]! [facility] introduces edjing patient portal. Now you can access parts of your medical record, email your doctor's office, and request medication refills online. 1. In your internet browser, go to [email or web address] 2. Click on the First Time User? Click Here link in the Sign In box. You will see the New Member Sign Up page. 3. Enter your M[employer] Access Code exactly as it appears below. You will not need to use this code after youve completed the sign-up process. If you do not sign up before the expiration date, you must request a new code. · Manga Corta Access Code: [email or web address] Expires: 2/12/2018 10:21 AM 
 
4. Enter the last four digits of your Social Security Number (xxxx) and Date of Birth (mm/dd/yyyy) as indicated and click Submit. You will be taken to the next sign-up page. 5. Create a Manga Corta ID. This will be your Manga Corta login ID and cannot be changed, so think of one that is secure and easy to remember. 6. Create a Manga Corta password. You can change your password at any time. 7. Enter your Password Reset Question and Answer. This can be used at a later time if you forget your password. 8. Enter your e-mail address. You will receive e-mail notification when new information is available in [address] 9. Click Sign Up. You can now view and download portions of your medical record. 10. Click the Download Summary menu link to download a portable copy of your medical information. If you have questions, please visit the Frequently Asked Questions section of the Manga Corta website. Remember, Manga Corta is NOT to be used for urgent needs. For medical emergencies, dial 911. Now available from your iPhone and Android! Please provide this summary of care documentation to your next provider. Your primary care clinician is listed as Chris Sanchez. If you have any questions after today's visit, please call [phone].

## 2018-01-05 ENCOUNTER — TELEPHONE (OUTPATIENT)
Dept: NEUROLOGY | Age: 78
End: 2018-01-05

## 2018-01-05 NOTE — TELEPHONE ENCOUNTER
----- Message from [employer]  sent at 1/4/2018  2:44 PM EST -----  Regarding: /telephone  Pt is requesting a call back from the practice. Best contact [phone].

## 2018-01-09 ENCOUNTER — OFFICE VISIT (OUTPATIENT)
Dept: CARDIOLOGY CLINIC | Age: 78
End: 2018-01-09

## 2018-01-09 VITALS
HEART RATE: 80 BPM | OXYGEN SATURATION: 92 % | RESPIRATION RATE: 12 BRPM | HEIGHT: 61 IN | SYSTOLIC BLOOD PRESSURE: 113 MMHG | DIASTOLIC BLOOD PRESSURE: 67 MMHG

## 2018-01-09 DIAGNOSIS — R26.81 GAIT INSTABILITY: ICD-10-CM

## 2018-01-09 DIAGNOSIS — I89.0 LYMPHEDEMA: ICD-10-CM

## 2018-01-09 DIAGNOSIS — M48.061 SPINAL STENOSIS OF LUMBAR REGION WITHOUT NEUROGENIC CLAUDICATION: ICD-10-CM

## 2018-01-09 DIAGNOSIS — G30.1 SENILE DEMENTIA OF ALZHEIMER'S TYPE (HCC): Chronic | ICD-10-CM

## 2018-01-09 DIAGNOSIS — K21.00 GASTROESOPHAGEAL REFLUX DISEASE WITH ESOPHAGITIS: ICD-10-CM

## 2018-01-09 DIAGNOSIS — Z78.9 STATIN INTOLERANCE: ICD-10-CM

## 2018-01-09 DIAGNOSIS — E78.00 HYPERCHOLESTEREMIA: ICD-10-CM

## 2018-01-09 DIAGNOSIS — F02.80 SENILE DEMENTIA OF ALZHEIMER'S TYPE (HCC): Chronic | ICD-10-CM

## 2018-01-09 DIAGNOSIS — F41.8 DEPRESSION WITH ANXIETY: ICD-10-CM

## 2018-01-09 DIAGNOSIS — I10 ESSENTIAL HYPERTENSION: Primary | ICD-10-CM

## 2018-01-09 DIAGNOSIS — E66.01 MORBID OBESITY (HCC): Chronic | ICD-10-CM

## 2018-01-09 DIAGNOSIS — R51.9 CHRONIC NONINTRACTABLE HEADACHE, UNSPECIFIED HEADACHE TYPE: ICD-10-CM

## 2018-01-09 DIAGNOSIS — Z99.3 WHEELCHAIR DEPENDENT: ICD-10-CM

## 2018-01-09 DIAGNOSIS — I25.10 CORONARY ARTERY DISEASE INVOLVING NATIVE CORONARY ARTERY OF NATIVE HEART WITHOUT ANGINA PECTORIS: ICD-10-CM

## 2018-01-09 DIAGNOSIS — G89.29 CHRONIC NONINTRACTABLE HEADACHE, UNSPECIFIED HEADACHE TYPE: ICD-10-CM

## 2018-01-09 NOTE — MR AVS SNAPSHOT
Visit Information Date & Time Provider Department Dept. Phone Encounter #  
 1/9/2018 10:30 AM Oswald Chau MD [address] [facility] 1 [id number] Your Appointments 2/5/2018  9:30 AM  
ROUTINE CARE with DO Derrick Puente 74 (JASMIN Harding) Appt Note: 6 MO F/U for bp, headache, results [address] [facility] [address]  
[phone]  
  
   
 [address]  
  
    
 7/5/2018 10:20 AM  
Follow Up with Deneen Ortez MD  
[facility] at [facility] [address] [facility] Appt Note: 6 month f/u memory loss NN 1/2/18 [address] [place] [address] Drive [address] [facility] [address]  
[phone]  
  
   
 [address] [address] Upcoming Health Maintenance Date Due  
 GLAUCOMA SCREENING Q2Y 4/27/2018* MEDICARE YEARLY EXAM 8/4/2018 DTaP/Tdap/Td series (2 - Td) 9/29/2026 *Topic was postponed. The date shown is not the original due date. Allergies as of 1/9/2018  Review Complete On: 1/9/2018 By: Abdoulaye Garcia LPN Severity Noted Reaction Type Reactions Aristocort [Triamcinolone] High 01/01/1970    Hives Aristocort shot Codeine High 08/10/2009    Itching Lasix [Furosemide] High 08/10/2009    Itching Bupropion  07/25/2011    Other (comments) Severe headache Darvocet A500 [Propoxyphene N-acetaminophen]  09/30/2010    Other (comments)  
 headache Influenza Virus Vaccine Whole  09/30/2009    Other (comments)  
 pharyngitis Mevacor [Lovastatin]  08/10/2009    Other (comments)  
 myalgias Percocet [Oxycodone-acetaminophen]  07/15/2011    Itching Worse headache Rhinocort  08/10/2009    Other (comments)  
 nosebleed Simvastatin  08/10/2009    Other (comments)  
 myalgias Current Immunizations  Reviewed on 12/5/2017 Name Date Influenza Vaccine Split  Deferred (Contraindication) TB Skin Test (PPD) Intradermal 8/5/2013 TD Vaccine 3/28/2008 ZZZ-RETIRED (DO NOT USE) Pneumococcal Vaccine (Unspecified Type)  Deferred (Patient Refused) Not reviewed this visit Vitals BP Pulse Resp Height(growth percentile) SpO2 OB Status 113/67 (BP 1 Location: Right arm, BP Patient Position: Sitting) 80 12 5' 1\" (1.549 m) 92% Postmenopausal  
 Smoking Status Former Smoker Vitals History Preferred Pharmacy Pharmacy Name Phone [address] [facility] [place] [address] [phone] Your Updated Medication List  
  
   
This list is accurate as of: 1/9/18 12:00 PM.  Always use your most recent med list.  
  
  
  
  
 albuterol 90 mcg/actuation inhaler Commonly known as:  PROVENTIL HFA, VENTOLIN HFA, PROAIR HFA Take 2 Puffs by inhalation every six (6) hours as needed for Wheezing or Shortness of Breath. Indications: BRONCHOSPASM PREVENTION  
  
 amLODIPine 2.5 mg tablet Commonly known as:  Alicenell Manual Take 1 tablet by mouth daily. aspirin 81 mg chewable tablet Take 1 Tab by mouth daily. Indications: prevention of cerebrovascular accident  
  
 bumetanide 1 mg tablet Commonly known as:  Lebanon Beth TAKE 1 TABLET BY MOUTH EVERY DAY  
  
 cholecalciferol 1,000 unit tablet Commonly known as:  VITAMIN D3 Take 1 Tab by mouth daily. FIORICET -40 mg per capsule Generic drug:  butalbital-acetaminophen-caff Take  by mouth every four (4) hours as needed for Pain. fluticasone 50 mcg/actuation nasal spray Commonly known as:  Lucy Bukc 2 Sprays by Both Nostrils route daily. Indications: ALLERGIC RHINITIS  
  
 guaiFENesin-dextromethorphan  mg/5 mL Liqd Commonly known as:  SILTUSSIN DM CARMONA Take 10 mL by mouth every four (4) hours as needed. Indications: COLD SYMPTOMS  
  
 lisinopril 5 mg tablet Commonly known as:  Lu Rawls  
 Take 1 tablet by mouth daily. loratadine 10 mg tablet Commonly known as:  Opal Peggy Take 1 Tab by mouth daily. Mapap 325 mg tablet Generic drug:  acetaminophen TAKE 2 TABLETS BY MOUTH EVERY 6-8 HOURS AS NEEDED FOR PAIN  
  
 mirtazapine 15 mg disintegrating tablet Commonly known as:  REMERON SOL-TAB Take 15 mg by mouth nightly. montelukast 10 mg tablet Commonly known as:  SINGULAIR  
  
 ondansetron 8 mg disintegrating tablet Commonly known as:  ZOFRAN ODT Take 1 Tab by mouth every eight (8) hours as needed for Nausea. OTHER Please dispense an Ultra light Wheelchair DX.severe osteoarthritis, fibromyalgia, Bell's Palsy, DJD,muscle weakness, lumbar stenosis, knee and shoulder pain  
  
 pantoprazole 40 mg tablet Commonly known as:  PROTONIX Take 1 Tab by mouth daily. potassium chloride SR 10 mEq tablet Commonly known as:  KLOR-CON 10  
TAKE 1 TABLET BY MOUTH EVERY DAY  
  
 sertraline 50 mg tablet Commonly known as:  ZOLOFT Take 1 Tab by mouth daily. Indications: ANXIETY WITH DEPRESSION  
  
 traMADol 50 mg tablet Commonly known as:  ULTRAM  
  
  
  
  
Introducing My Own Med! [employer] introduces [employer] patient portal. Now you can access parts of your medical record, email your doctor's office, and request medication refills online. 1. In your internet browser, go to [email or web address] 2. Click on the First Time User? Click Here link in the Sign In box. You will see the New Member Sign Up page. 3. Enter your [employer] Access Code exactly as it appears below. You will not need to use this code after youve completed the sign-up process. If you do not sign up before the expiration date, you must request a new code. · [facility] [facility] Access Code: [email or web address] Expires: 2/12/2018 10:21 AM 
 
4.  Enter the last four digits of your Social Security Number (xxxx) and Date of Birth (mm/dd/yyyy) as indicated and click Submit. You will be taken to the next sign-up page. 5. Create a Mobisante ID. This will be your Mobisante login ID and cannot be changed, so think of one that is secure and easy to remember. 6. Create a Mobisante password. You can change your password at any time. 7. Enter your Password Reset Question and Answer. This can be used at a later time if you forget your password. 8. Enter your e-mail address. You will receive e-mail notification when new information is available in [address] 9. Click Sign Up. You can now view and download portions of your medical record. 10. Click the Download Summary menu link to download a portable copy of your medical information. If you have questions, please visit the Frequently Asked Questions section of the [employer]is[employer] website. Remember, [employer] is NOT to be used for urgent needs. For medical emergencies, dial 911. Now available from your iPhone and Android! Please provide this summary of care documentation to your next provider. Your primary care clinician is listed as Jonathon Clarke. If you have any questions after today's visit, please call [phone].

## 2018-01-09 NOTE — PROGRESS NOTES
Chief Complaint   Patient presents with   [facility].     Annual appt, pt c/o swelling leg bilateral states she is not ambulatory. 1. Have you been to the ER, urgent care clinic since your last visit? Hospitalized since your last visit? Yes When: 11/2017, \[facility] [place] [facility]\""C, body aches    2. Have you seen or consulted any other health care providers outside of the [facility] since your last visit? Include any pap smears or colon screening. No      Pt states she is unaware of medications she is currently taking .

## 2018-01-10 NOTE — PROGRESS NOTES
[facility]   [address] [facility], [address]                                          NEW PATIENT HPI/FOLLOW-UP      NAME:  Imtiaz Forman   :   1940   MRN:   [id number]   PCP:  Brad Sin DO           Subjective: The patient is a 76y.o. year old female  who returns for a routine follow-up after long hiatus. Since the last visit, patient has become wheelchair bound and is confined to [facility] for 6 yrs. Denies chest pain, medication intolerance, palpitations, shortness of breath, PND/orthopnea wheezing, sputum, syncope, dizziness or light headedness. Doing satisfactorily wheelchair bound. Review of Systems  General: Pt denies excessive weight gain or loss. Pt is able to conduct ADL's. Respiratory: +LEIVA, -wheezing or stridor. Cardiovascular: Denies precordial pain, palpitations, edema or PND  Gastrointestinal: Denies poor appetite, indigestion, abdominal pain or blood in stool  Peripheral vascular: Denies claudication, leg cramps  Neuropsychiatric: Denies paresthesias,tingling,numbness,anxiety,depression,fatigue  Musculoskeletal: Denies pain,tenderness, soreness,swelling      Past Medical History:   Diagnosis Date    Abnormal cardiovascular stress test 08    Dr. Moncada Distance    Allergy, unspecified not elsewhere classified     Bell's palsy 2005    Left. Dr. Trent Men CAD (coronary artery disease)     Dr. Villagomez Brain     Dr. Opal Adame. Dr. Miguel Angel Bell Chest pain     Dr. Barajas Seek Chronic headache     due to depression. Dr. Lonnie Man.  Chronic pain     LEGS AND FEET; BACK UNDER RIGHT SHOULDER    Congenital eye disorder     Lazy eyes. Dr. Juvenal Yin Cap.  Depression     Dr. Tamela Mario (psychologist). Dr. Lonnie Man. Dr. Aissatou Ojeda.  DJD (degenerative joint disease)     knees, low back. Dr. Malaika Goldstein.   Dr. Garo Alejandro Fibromyalgia 1980s    Dr. Lisa Curiel. Dr. Andrade Artist Gait instability 02/10/09    Dr. Sivakumar Fofana. Dr. Devoria Moritz.  Gallstones     GERD (gastroesophageal reflux disease)     Hiatal hernia 1977    Hypercholesteremia     Hypertension     Ill-defined condition     \"JUST ONE NIGHT I WENT TO STAND UP AND I JUST COULDN'T STAND\"  PT NOT SURE WHY    Leg weakness 12/2009    Dr. Jori Lynn. Dr. Rosana Altamirano.  Lower extremity venous stasis     Dr. Chidi Cavanaugh    Lumbar spinal stenosis 12/2009    L3-5. Dr. Franck Barba. PARAPLEGIA SINCE 2012    Lymphedema 12/19/08    Dr. Mya Pittman then Dr. Clara Ordoñez    Memory loss     Menopause     Migraine     Ocular. Dr. Kelsie Munroe    NORMA (obstructive sleep apnea) 01/24/07    Dr. Adan Cruz. NO CPAP    Osteoporosis 02/10/09    Dr. Vargas Hall    Pancreatitis 03/2011    Post-menopausal bleeding 07/2016    due to Dr. Esme Jones Jr.    Rotator cuff injury     Left. Dr. Miesha Alvarez; chronic bilateral    Seborrheic keratosis 2005    Dr. Norm Potts 02/05/09    Dr. Rajiv Em   [address] [place] [address] Shadi Tremor 2013    Dr. Devoria Moritz.  Unspecified vitamin D deficiency 07/2009    Urinary incontinence 2013    Dr. Laura Arreguin Venous insufficiency 1998    R>L. with chronic leg edema.   Dr. Clara Ordoñez     Patient Active Problem List    Diagnosis Date Noted    Wheelchair dependent 01/09/2018    Allergic rhinitis due to allergen 12/05/2017    Coronary artery disease involving native coronary artery of native heart without angina pectoris 08/03/2017    Statin intolerance 08/03/2017    Abnormal mammogram of left breast 08/03/2017    Esotropia 09/29/2016    Cataract 09/29/2016    Essential hypertension 06/02/2015    Vitamin D deficiency 01/27/2015    Hyperglycemia 01/27/2015    Tremor     Chronic headache     Depression with anxiety 08/13/2013    Senile dementia of Alzheimer's type 08/13/2013    Hypokalemia 03/29/2011    Morbid obesity ([address] 03/29/2011    Lower extremity venous stasis     Lumbar spinal stenosis 12/01/2009    Leg weakness 12/01/2009    Hypercholesteremia     GERD (gastroesophageal reflux disease)     Osteoporosis 02/10/2009    Gait instability 02/10/2009    Lymphedema 12/19/2008      Past Surgical History:   Procedure Laterality Date    HX HEART CATHETERIZATION  06/2008    Dr. Nahomy Stevens. due to abnormal Stress Test    HX MAR AND BSO  09/14/2016    Dr. Leslie Quiros, Re[place] Hidden HYSTEROSCOPIC MYOMECTOMY  07/27/2016    D AND C WITH SYMPHION, RESECTION OF ENDOMETRIAL TISSUE. benign. due to postmenopausal bleeding. Dr. Justin Smart.      Allergies   Allergen Reactions    Aristocort [Triamcinolone] Hives     Aristocort shot    Codeine Itching    Lasix [Furosemide] Itching    Bupropion Other (comments)     Severe headache    Darvocet A500 [Propoxyphene N-Acetaminophen] Other (comments)     headache    Influenza Virus Vaccine Whole Other (comments)     pharyngitis    Mevacor [Lovastatin] Other (comments)     myalgias    Percocet [Oxycodone-Acetaminophen] Itching     Worse headache    Rhinocort Other (comments)     nosebleed    Simvastatin Other (comments)     myalgias      Family History   Problem Relation Age of Onset    Hypertension Mother    [facility] Arthritis-osteo Mother     Dementia Mother     Cancer Father      liver    Heart Attack Father     Other Father      kidney stones    Stroke Maternal Grandmother     Hypertension Sister     Arthritis-osteo Sister     Diabetes Paternal Aunt     Anesth Problems Neg Hx       Social History     Social History    Marital status: SINGLE     Spouse name: N/A    Number of children: N/A    Years of education: N/A     Occupational History    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 Pt lived with smoker dad x 40 yrs    Alcohol use No    Drug use: No    Sexual activity: Yes     Partners: Male     Birth control/ protection: Condom     Other Topics Concern    Not on file     Social History Narrative    Lives in [place] at [facility]       Current Outpatient Prescriptions   Medication Sig    butalbital-acetaminophen-caff (FIORICET) -40 mg per capsule Take  by mouth every four (4) hours as needed for Pain.  montelukast (SINGULAIR) 10 mg tablet     traMADol (ULTRAM) 50 mg tablet     sertraline (ZOLOFT) 50 mg tablet Take 1 Tab by mouth daily. Indications: ANXIETY WITH DEPRESSION    cholecalciferol (VITAMIN D3) 1,000 unit tablet Take 1 Tab by mouth daily.  pantoprazole (PROTONIX) 40 mg tablet Take 1 Tab by mouth daily.  loratadine (CLARITIN) 10 mg tablet Take 1 Tab by mouth daily.  aspirin 81 mg chewable tablet Take 1 Tab by mouth daily. Indications: prevention of cerebrovascular accident    ondansetron (ZOFRAN ODT) 8 mg disintegrating tablet Take 1 Tab by mouth every eight (8) hours as needed for Nausea.  albuterol (PROVENTIL HFA, VENTOLIN HFA, PROAIR HFA) 90 mcg/actuation inhaler Take 2 Puffs by inhalation every six (6) hours as needed for Wheezing or Shortness of Breath. Indications: BRONCHOSPASM PREVENTION    guaiFENesin-dextromethorphan (SILTUSSIN DM CARMONA)  mg/5 mL liqd Take 10 mL by mouth every four (4) hours as needed. Indications: COLD SYMPTOMS    MAPAP 325 mg tablet TAKE 2 TABLETS BY MOUTH EVERY 6-8 HOURS AS NEEDED FOR PAIN    mirtazapine (REMERON SOL-TAB) 15 mg disintegrating tablet Take 15 mg by mouth nightly.  fluticasone (FLONASE) 50 mcg/actuation nasal spray 2 Sprays by Both Nostrils route daily.  Indications: ALLERGIC RHINITIS    OTHER Please dispense an Ultra light Wheelchair DX.severe osteoarthritis, fibromyalgia, Bell's Palsy, DJD,muscle weakness, lumbar stenosis, knee and shoulder pain    amlodipine (NORVASC) 2.5 mg tablet Take 1 tablet by mouth daily.  bumetanide (BUMEX) 1 mg tablet TAKE 1 TABLET BY MOUTH EVERY DAY    potassium chloride SR (KLOR-CON 10) 10 mEq tablet TAKE 1 TABLET BY MOUTH EVERY DAY    lisinopril (PRINIVIL, ZESTRIL) 5 mg tablet Take 1 tablet by mouth daily. No current facility-administered medications for this visit. I have reviewed the nurses notes, vitals, problem list, allergy list, medical history, family medical, social history and medications. Objective:     Physical Exam:     Vitals:    01/09/18 1115 01/09/18 1122   BP: 123/74 113/67   Pulse: 80    Resp: 12    SpO2: 92%    Height: 5' 1\" (1.549 m)     There is no height or weight on file to calculate BMI. General: Well developed, morbidly obese in no acute distress. HEENT: No carotid bruits, no JVD, trach is midline. Heart:  Normal S1/S2 negative S3 or S4. Regular, no murmur, gallop or rub.   Respiratory: Clear bilaterally, no wheezing or rales  Abdomen:   Soft, non-tender, bowel sounds are active.   Extremities:  + lymphedema, normal cap refill, no cyanosis. Neuro: A&Ox3, speech clear, gait stable. Skin: Skin color is normal. No rashes or lesions. No diaphoresis. Vascular: 2+ pulses symmetric in all extremities        Data Review:       Cardiographics:    EKG: Not done--11/17 EKG results noted below.     Cardiology Labs:    Results for orders placed or performed during the hospital encounter of 11/14/17   EKG, 12 LEAD, INITIAL   Result Value Ref Range    Ventricular Rate 66 BPM    Atrial Rate 66 BPM    P-R Interval 182 ms    QRS Duration 108 ms    Q-T Interval 432 ms    QTC Calculation (Bezet) 452 ms    Calculated P Axis 28 degrees    Calculated R Axis 2 degrees    Calculated T Axis 7 degrees    Diagnosis       Normal sinus rhythm with sinus arrhythmia  Nonspecific T wave abnormality  Confirmed by Dara Torres ([id number]) on 11/14/2017 8:39:44 PM     Results for orders placed or performed in visit on 11/30/10 AMB POC EKG ROUTINE W/ 12 LEADS, INTER & REP    Impression    Normal sinus rhythm at 77 bpm.  Will fax to Dr. Lemar Cowden. EKG was performed standing. Not able to get pt on  exam table. Lab Results   Component Value Date/Time    Cholesterol, total 244 12/05/2017 12:21 PM    HDL Cholesterol 51 12/05/2017 12:21 PM    LDL, calculated 153 12/05/2017 12:21 PM    Triglyceride 199 12/05/2017 12:21 PM    CHOL/HDL Ratio 4.6 08/14/2013 06:00 AM       Lab Results   Component Value Date/Time    Sodium 141 12/05/2017 12:21 PM    Potassium 4.0 12/05/2017 12:21 PM    Chloride 97 12/05/2017 12:21 PM    CO2 29 12/05/2017 12:21 PM    Anion gap 7 11/14/2017 10:27 AM    Glucose 83 12/05/2017 12:21 PM    Glucose 78 08/14/2013 06:00 AM    BUN 13 12/05/2017 12:21 PM    Creatinine 0.87 12/05/2017 12:21 PM    BUN/Creatinine ratio 15 12/05/2017 12:21 PM    GFR est AA 73 12/05/2017 12:21 PM    GFR est non-AA 64 12/05/2017 12:21 PM    Calcium 9.2 12/05/2017 12:21 PM    Bilirubin, total <0.2 12/05/2017 12:21 PM    AST (SGOT) 12 12/05/2017 12:21 PM    Alk. phosphatase 88 12/05/2017 12:21 PM    Protein, total 6.8 12/05/2017 12:21 PM    Albumin 4.0 12/05/2017 12:21 PM    Globulin 3.9 11/14/2017 10:27 AM    A-G Ratio 1.4 12/05/2017 12:21 PM    ALT (SGPT) 11 12/05/2017 12:21 PM          Assessment:       ICD-10-CM ICD-9-CM    1. Essential hypertension I10 401.9    2. Hypercholesteremia E78.00 272.0    3. Coronary artery disease involving native coronary artery of native heart without angina pectoris I25.10 414.01    4. Morbid obesity ([address] E66.01 278.01    5. Senile dementia of Alzheimer's type G30.1 331.0     F02.80 294.10    6. Lymphedema I89.0 457.1    7. Gastroesophageal reflux disease with esophagitis K21.0 530.11    8. Gait instability R26.81 781.2    9. Spinal stenosis of lumbar region without neurogenic claudication M48.061 724.02    10. Depression with anxiety F41.8 300.4    11. Statin intolerance Z78.9 995.27    12. Wheelchair dependent Z99.3 V46.3    13. Chronic nonintractable headache, unspecified headache type R51 784.0          Discussion: Patient presents at this time remarkably stable from a cardiac perspective. Is wheelchair bound in SNF but spirits up. Visited frequently by relatives. Pleased with present cardiac status. Plan: 1. Continue same meds. Lipid profile and labs followed by PCP. 2.Encouraged to exercise to tolerance, lose weight and follow low fat, low cholesterol, low sodium predominantly Plant-based (consider Mediterranean) diet. Call with questions or concerns. Will follow up any test results by phone and/or f/u here in office if needed. Rosendo Butler 3.Follow up: 6 months to 1 year as transport difficult. I have discussed the diagnosis with the patient and the intended plan as seen in the above orders. The patient has received an after-visit summary and questions were answered concerning future plans. I have discussed any concerning medication side effects and warnings with the patient as well.     Camden Pearl MD  1/9/2018

## 2018-01-27 ENCOUNTER — HOSPITAL ENCOUNTER (EMERGENCY)
Age: 78
Discharge: HOME | End: 2018-01-28
Attending: EMERGENCY MEDICINE
Payer: MEDICARE

## 2018-01-27 DIAGNOSIS — R10.13 DYSPEPSIA: ICD-10-CM

## 2018-01-27 DIAGNOSIS — R10.13 ABDOMINAL PAIN, EPIGASTRIC: Primary | ICD-10-CM

## 2018-01-27 PROCEDURE — 96375 TX/PRO/DX INJ NEW DRUG ADDON: CPT

## 2018-01-27 PROCEDURE — 96374 THER/PROPH/DIAG INJ IV PUSH: CPT

## 2018-01-27 PROCEDURE — 99284 EMERGENCY DEPT VISIT MOD MDM: CPT

## 2018-01-28 VITALS
HEIGHT: 61 IN | WEIGHT: 290 LBS | SYSTOLIC BLOOD PRESSURE: 135 MMHG | DIASTOLIC BLOOD PRESSURE: 63 MMHG | RESPIRATION RATE: 14 BRPM | HEART RATE: 80 BPM | OXYGEN SATURATION: 97 % | BODY MASS INDEX: 54.75 KG/M2 | TEMPERATURE: 97.9 F

## 2018-01-28 LAB
ALBUMIN SERPL-MCNC: 3.1 G/DL (ref 3.5–5)
ALBUMIN/GLOB SERPL: 0.8 {RATIO} (ref 1.1–2.2)
ALP SERPL-CCNC: 110 U/L (ref 45–117)
ALT SERPL-CCNC: 42 U/L (ref 12–78)
ANION GAP SERPL CALC-SCNC: 8 MMOL/L (ref 5–15)
AST SERPL-CCNC: 107 U/L (ref 15–37)
ATRIAL RATE: 72 BPM
BASOPHILS # BLD: 0 K/UL (ref 0–0.1)
BASOPHILS NFR BLD: 0 % (ref 0–1)
BILIRUB SERPL-MCNC: 0.4 MG/DL (ref 0.2–1)
BUN SERPL-MCNC: 14 MG/DL (ref 6–20)
BUN/CREAT SERPL: 15 (ref 12–20)
CALCIUM SERPL-MCNC: 8.5 MG/DL (ref 8.5–10.1)
CALCULATED P AXIS, ECG09: 32 DEGREES
CALCULATED T AXIS, ECG11: 11 DEGREES
CHLORIDE SERPL-SCNC: 102 MMOL/L (ref 97–108)
CO2 SERPL-SCNC: 30 MMOL/L (ref 21–32)
CREAT SERPL-MCNC: 0.96 MG/DL (ref 0.55–1.02)
DIAGNOSIS, 93000: NORMAL
DIFFERENTIAL METHOD BLD: ABNORMAL
EOSINOPHIL # BLD: 0.1 K/UL (ref 0–0.4)
EOSINOPHIL NFR BLD: 1 % (ref 0–7)
ERYTHROCYTE [DISTWIDTH] IN BLOOD BY AUTOMATED COUNT: 15.1 % (ref 11.5–14.5)
GLOBULIN SER CALC-MCNC: 3.8 G/DL (ref 2–4)
GLUCOSE SERPL-MCNC: 193 MG/DL (ref 65–100)
HCT VFR BLD AUTO: 37.2 % (ref 35–47)
HGB BLD-MCNC: 11.4 G/DL (ref 11.5–16)
IMM GRANULOCYTES # BLD: 0 K/UL (ref 0–0.04)
IMM GRANULOCYTES NFR BLD AUTO: 0 % (ref 0–0.5)
LIPASE SERPL-CCNC: 240 U/L (ref 73–393)
LYMPHOCYTES # BLD: 1.9 K/UL (ref 0.8–3.5)
LYMPHOCYTES NFR BLD: 19 % (ref 12–49)
MCH RBC QN AUTO: 28.4 PG (ref 26–34)
MCHC RBC AUTO-ENTMCNC: 30.6 G/DL (ref 30–36.5)
MCV RBC AUTO: 92.5 FL (ref 80–99)
MONOCYTES # BLD: 0.8 K/UL (ref 0–1)
MONOCYTES NFR BLD: 8 % (ref 5–13)
NEUTS SEG # BLD: 7.2 K/UL (ref 1.8–8)
NEUTS SEG NFR BLD: 72 % (ref 32–75)
NRBC # BLD: 0 K/UL (ref 0–0.01)
NRBC BLD-RTO: 0 PER 100 WBC
P-R INTERVAL, ECG05: 192 MS
PLATELET # BLD AUTO: 287 K/UL (ref 150–400)
PMV BLD AUTO: 11 FL (ref 8.9–12.9)
POTASSIUM SERPL-SCNC: 3.1 MMOL/L (ref 3.5–5.1)
PROT SERPL-MCNC: 6.9 G/DL (ref 6.4–8.2)
Q-T INTERVAL, ECG07: 414 MS
QRS DURATION, ECG06: 112 MS
QTC CALCULATION (BEZET), ECG08: 453 MS
RBC # BLD AUTO: 4.02 M/UL (ref 3.8–5.2)
SODIUM SERPL-SCNC: 140 MMOL/L (ref 136–145)
VENTRICULAR RATE, ECG03: 72 BPM
WBC # BLD AUTO: 10.1 K/UL (ref 3.6–11)

## 2018-01-28 PROCEDURE — 36415 COLL VENOUS BLD VENIPUNCTURE: CPT | Performed by: EMERGENCY MEDICINE

## 2018-01-28 PROCEDURE — 74011250637 HC RX REV CODE- 250/637: Performed by: EMERGENCY MEDICINE

## 2018-01-28 PROCEDURE — 80053 COMPREHEN METABOLIC PANEL: CPT | Performed by: EMERGENCY MEDICINE

## 2018-01-28 PROCEDURE — 83690 ASSAY OF LIPASE: CPT | Performed by: EMERGENCY MEDICINE

## 2018-01-28 PROCEDURE — 93005 ELECTROCARDIOGRAM TRACING: CPT

## 2018-01-28 PROCEDURE — 85025 COMPLETE CBC W/AUTO DIFF WBC: CPT | Performed by: EMERGENCY MEDICINE

## 2018-01-28 PROCEDURE — 74011250636 HC RX REV CODE- 250/636: Performed by: EMERGENCY MEDICINE

## 2018-01-28 RX ORDER — FAMOTIDINE 20 MG/1
20 TABLET, FILM COATED ORAL 2 TIMES DAILY
Qty: 20 TAB | Refills: 0 | Status: SHIPPED | OUTPATIENT
Start: 2018-01-28 | End: 2018-04-05 | Stop reason: ALTCHOICE

## 2018-01-28 RX ORDER — POTASSIUM CHLORIDE 20 MEQ/1
40 TABLET, EXTENDED RELEASE ORAL
Status: COMPLETED | OUTPATIENT
Start: 2018-01-28 | End: 2018-01-28

## 2018-01-28 RX ORDER — MORPHINE SULFATE 4 MG/ML
2 INJECTION, SOLUTION INTRAMUSCULAR; INTRAVENOUS ONCE
Status: COMPLETED | OUTPATIENT
Start: 2018-01-28 | End: 2018-01-28

## 2018-01-28 RX ORDER — ONDANSETRON 2 MG/ML
8 INJECTION INTRAMUSCULAR; INTRAVENOUS
Status: COMPLETED | OUTPATIENT
Start: 2018-01-28 | End: 2018-01-28

## 2018-01-28 RX ADMIN — ONDANSETRON HYDROCHLORIDE 8 MG: 2 INJECTION, SOLUTION INTRAMUSCULAR; INTRAVENOUS at 00:43

## 2018-01-28 RX ADMIN — MORPHINE SULFATE 2 MG: 4 INJECTION, SOLUTION INTRAMUSCULAR; INTRAVENOUS at 00:43

## 2018-01-28 RX ADMIN — POTASSIUM CHLORIDE 40 MEQ: 20 TABLET, EXTENDED RELEASE ORAL at 01:57

## 2018-01-28 NOTE — ED PROVIDER NOTES
EMERGENCY DEPARTMENT HISTORY AND PHYSICAL EXAM      Date: 1/27/2018  Patient Name: Cristel Prince    History of Presenting Illness     Chief Complaint   Patient presents with    Abdominal Pain       History Provided By: Patient    HPI: Cristel Prince, 76 y.o. female with PMHx significant for GERD, CAD, presents via EMS from [facility] to the ED with cc of acute onset constant 10/10 mid-abdominal pain ~9:00 PM today. Pt reports associated nausea and one episode of vomiting. She states she has been eating and drinking normally. Pt denies taking any medication for her symptoms. She denies a hx of gall bladder disease or COPD. Pt denies any recent sick contact. She specifically denies any recent diarrhea, fevers, CP, or SOB. PCP: Ирина Clinton,     There are no other complaints, changes, or physical findings at this time. Current Outpatient Prescriptions   Medication Sig Dispense Refill    famotidine (PEPCID) 20 mg tablet Take 1 Tab by mouth two (2) times a day. 20 Tab 0    butalbital-acetaminophen-caff (FIORICET) -40 mg per capsule Take  by mouth every four (4) hours as needed for Pain.  montelukast (SINGULAIR) 10 mg tablet       traMADol (ULTRAM) 50 mg tablet       sertraline (ZOLOFT) 50 mg tablet Take 1 Tab by mouth daily. Indications: ANXIETY WITH DEPRESSION 30 Tab 2    cholecalciferol (VITAMIN D3) 1,000 unit tablet Take 1 Tab by mouth daily. 90 Tab 3    pantoprazole (PROTONIX) 40 mg tablet Take 1 Tab by mouth daily. 90 Tab 3    loratadine (CLARITIN) 10 mg tablet Take 1 Tab by mouth daily. 90 Tab 3    aspirin 81 mg chewable tablet Take 1 Tab by mouth daily. Indications: prevention of cerebrovascular accident [address] Tab 3    ondansetron (ZOFRAN ODT) 8 mg disintegrating tablet Take 1 Tab by mouth every eight (8) hours as needed for Nausea.  10 Tab 0    albuterol (PROVENTIL HFA, VENTOLIN HFA, PROAIR HFA) 90 mcg/actuation inhaler Take 2 Puffs by inhalation every six (6) hours as needed for Wheezing or Shortness of Breath. Indications: BRONCHOSPASM PREVENTION 1 Inhaler 1    guaiFENesin-dextromethorphan (SILTUSSIN DM CARMONA)  mg/5 mL liqd Take 10 mL by mouth every four (4) hours as needed. Indications: COLD SYMPTOMS 236 mL 0    MAPAP 325 mg tablet TAKE 2 TABLETS BY MOUTH EVERY 6-8 HOURS AS NEEDED FOR PAIN 90 Tab 0    mirtazapine (REMERON SOL-TAB) 15 mg disintegrating tablet Take 15 mg by mouth nightly.  fluticasone (FLONASE) 50 mcg/actuation nasal spray 2 Sprays by Both Nostrils route daily. Indications: ALLERGIC RHINITIS 1 Bottle 6    OTHER Please dispense an Ultra light Wheelchair DX.severe osteoarthritis, fibromyalgia, Bell's Palsy, DJD,muscle weakness, lumbar stenosis, knee and shoulder pain 1 Device 0    amlodipine (NORVASC) 2.5 mg tablet Take 1 tablet by mouth daily. 30 tablet 11    bumetanide (BUMEX) 1 mg tablet TAKE 1 TABLET BY MOUTH EVERY DAY 30 tablet 11    potassium chloride SR (KLOR-CON 10) 10 mEq tablet TAKE 1 TABLET BY MOUTH EVERY DAY 30 tablet 11    lisinopril (PRINIVIL, ZESTRIL) 5 mg tablet Take 1 tablet by mouth daily. 30 tablet 11       Past History     Past Medical History:  Past Medical History:   Diagnosis Date    Abnormal cardiovascular stress test 04/18/08    Dr. Casie Burciaga    Allergy, unspecified not elsewhere classified     Bell's palsy 02/2005    Left. Dr. Michelet Padilla CAD (coronary artery disease)     Dr. Vielka Meier. Dr. Ebony Plunkett Chest pain 2002    Dr. Landry Monday Chronic headache 2013    due to depression. Dr. Ermias Cutler.  Chronic pain     LEGS AND FEET; BACK UNDER RIGHT SHOULDER    Congenital eye disorder     Lazy eyes. Dr. Mady Oconnell.  Depression 2011    Dr. Talib Bay (psychologist). Dr. Ermias Cutler. Dr. Jd Syed.  DJD (degenerative joint disease)     knees, low back. Dr. Nedra Hawkins.   Dr. Kristel Zamora Dr. Deandra Goetz. Dr. William Daniels Gait instability 02/10/09    Dr. Jeni Carrel. Dr. Oscar Mcrae.  Gallstones     GERD (gastroesophageal reflux disease)     Hiatal hernia 1977    Hypercholesteremia     Hypertension     Ill-defined condition     \"JUST ONE NIGHT I WENT TO STAND UP AND I JUST COULDN'T STAND\"  PT NOT SURE WHY    Leg weakness 12/2009    Dr. Britney Brooke. Dr. Kristina Pavon.  Lower extremity venous stasis     Dr. Sharolyn Mortimer    Lumbar spinal stenosis 12/2009    L3-5. Dr. Sheyla Corral. PARAPLEGIA SINCE 2012    Lymphedema 12/19/08    Dr. Joaquín Devine then Dr. Ramon Mora    Memory loss     Menopause     Migraine     Ocular. Dr. Werner Etienne    NORMA (obstructive sleep apnea) 01/24/07    Dr. Kita Mendez. NO CPAP    Osteoporosis 02/10/09    Dr. Yuri Brower    Pancreatitis 03/2011    Post-menopausal bleeding 07/2016    due to Dr. Viktor Velazco Jr.    Rotator cuff injury     Left. Dr. Phuong Saldivar; chronic bilateral    Seborrheic keratosis 2005    Dr. Bella Turner 02/05/09    Dr. Thiago Maroom   [employer] Tremor 2013    Dr. Oscar Mcrae.  Unspecified vitamin D deficiency 07/2009    Urinary incontinence 2013    Dr. Marilee Killian Venous insufficiency 1998    R>L. with chronic leg edema. Dr. Ramon Mora       Past Surgical History:  Past Surgical History:   Procedure Laterality Date    HX HEART CATHETERIZATION  06/2008    Dr. Terra Doll. due to abnormal Stress Test    HX MAR AND BSO  09/14/2016    Shahnaz Adamson HYSTEROSCOPIC MYOMECTOMY  07/27/2016    D AND C WITH SYMPHION, RESECTION OF ENDOMETRIAL TISSUE. benign. due to postmenopausal bleeding. Dr. Calos Vital.        Family History:  Family History   Problem Relation Age of Onset    Hypertension Mother     Arthritis-osteo Mother     Dementia Mother     Cancer Father      liver    Heart Attack Father     Other Father      kidney stones    Stroke Maternal Grandmother     Hypertension Sister  Arthritis-osteo Sister     Diabetes Paternal Aunt     Anesth Problems Neg Hx        Social History:  Social History   Substance Use Topics    Smoking status: Former Smoker     Packs/day: 0.25     Years: 0.50     Types: Cigarettes     Quit date: 1/1/1962    Smokeless tobacco: Never Used      Comment: Pt smoked 3-4 cigs x 9 mos. Pt lived with smoker dad x 40 yrs    Alcohol use No       Allergies: Allergies   Allergen Reactions    Aristocort [Triamcinolone] Hives     Aristocort shot    Codeine Itching    Lasix [Furosemide] Itching    Bupropion Other (comments)     Severe headache    Darvocet A500 [Propoxyphene N-Acetaminophen] Other (comments)     headache    Influenza Virus Vaccine Whole Other (comments)     pharyngitis    Mevacor [Lovastatin] Other (comments)     myalgias    Percocet [Oxycodone-Acetaminophen] Itching     Worse headache    Rhinocort Other (comments)     nosebleed    Simvastatin Other (comments)     myalgias         Review of Systems   Review of Systems   Constitutional: Negative for activity change, appetite change, chills, fever and unexpected weight change. HENT: Negative for congestion. Eyes: Negative for visual disturbance. Respiratory: Negative for cough and shortness of breath. Cardiovascular: Negative for chest pain. Gastrointestinal: Positive for abdominal pain, nausea and vomiting. Negative for diarrhea. Genitourinary: Negative for dysuria. Musculoskeletal: Negative for back pain. Skin: Negative for rash. Neurological: Negative for headaches. Physical Exam   Physical Exam   Constitutional: She is oriented to person, place, and time. She appears well-developed and well-nourished. Morbidly obese female   HENT:   Head: Normocephalic and atraumatic. Mouth/Throat: Oropharynx is clear and moist.   Eyes: Conjunctivae are normal. Pupils are equal, round, and reactive to light. Right eye exhibits no discharge. Left eye exhibits no discharge.    R lazy eye with dysconjugate gaze   Neck: Normal range of motion. Neck supple. Cardiovascular: Normal rate, regular rhythm and normal heart sounds. No murmur heard. Pulmonary/Chest: Effort normal and breath sounds normal. No respiratory distress. She has no wheezes. She has no rales. Abdominal: Soft. Bowel sounds are normal. She exhibits no distension and no mass. There is no tenderness. There is no rebound and no guarding. Musculoskeletal: Normal range of motion. Neurological: She is alert and oriented to person, place, and time. No cranial nerve deficit. She exhibits normal muscle tone. Skin: Skin is warm and dry. No rash noted. She is not diaphoretic. Bilateral lower extremities with chronic skin changes c/w chronic edema   Nursing note and vitals reviewed. Diagnostic Study Results     Labs -     Recent Results (from the past 12 hour(s))   CBC WITH AUTOMATED DIFF    Collection Time: 01/28/18 12:20 AM   Result Value Ref Range    WBC 10.1 3.6 - 11.0 K/uL    RBC 4.02 3.80 - 5.20 M/uL    HGB 11.4 (L) 11.5 - 16.0 g/dL    HCT 37.2 35.0 - 47.0 %    MCV 92.5 80.0 - 99.0 FL    MCH 28.4 26.0 - 34.0 PG    MCHC 30.6 30.0 - 36.5 g/dL    RDW 15.1 (H) 11.5 - 14.5 %    PLATELET 120 888 - 903 K/uL    MPV 11.0 8.9 - 12.9 FL    NRBC 0.0 0  WBC    ABSOLUTE NRBC 0.00 0.00 - 0.01 K/uL    NEUTROPHILS 72 32 - 75 %    LYMPHOCYTES 19 12 - 49 %    MONOCYTES 8 5 - 13 %    EOSINOPHILS 1 0 - 7 %    BASOPHILS 0 0 - 1 %    IMMATURE GRANULOCYTES 0 0.0 - 0.5 %    ABS. NEUTROPHILS 7.2 1.8 - 8.0 K/UL    ABS. LYMPHOCYTES 1.9 0.8 - 3.5 K/UL    ABS. MONOCYTES 0.8 0.0 - 1.0 K/UL    ABS. EOSINOPHILS 0.1 0.0 - 0.4 K/UL    ABS. BASOPHILS 0.0 0.0 - 0.1 K/UL    ABS. IMM.  GRANS. 0.0 0.00 - 0.04 K/UL    DF AUTOMATED     METABOLIC PANEL, COMPREHENSIVE    Collection Time: 01/28/18 12:20 AM   Result Value Ref Range    Sodium 140 136 - 145 mmol/L    Potassium 3.1 (L) 3.5 - 5.1 mmol/L    Chloride 102 97 - 108 mmol/L    CO2 30 21 - 32 mmol/L Anion gap 8 5 - 15 mmol/L    Glucose 193 (H) 65 - 100 mg/dL    BUN 14 6 - 20 MG/DL    Creatinine 0.96 0.55 - 1.02 MG/DL    BUN/Creatinine ratio 15 12 - 20      GFR est AA >60 >60 ml/min/1.73m2    GFR est non-AA 56 (L) >60 ml/min/1.73m2    Calcium 8.5 8.5 - 10.1 MG/DL    Bilirubin, total 0.4 0.2 - 1.0 MG/DL    ALT (SGPT) 42 12 - 78 U/L    AST (SGOT) 107 (H) 15 - 37 U/L    Alk. phosphatase 110 45 - 117 U/L    Protein, total 6.9 6.4 - 8.2 g/dL    Albumin 3.1 (L) 3.5 - 5.0 g/dL    Globulin 3.8 2.0 - 4.0 g/dL    A-G Ratio 0.8 (L) 1.1 - 2.2     LIPASE    Collection Time: 01/28/18 12:20 AM   Result Value Ref Range    Lipase 240 73 - 393 U/L   EKG, 12 LEAD, INITIAL    Collection Time: 01/28/18 12:37 AM   Result Value Ref Range    Ventricular Rate 72 BPM    Atrial Rate 72 BPM    P-R Interval 192 ms    QRS Duration 112 ms    Q-T Interval 414 ms    QTC Calculation (Bezet) 453 ms    Calculated P Axis 32 degrees    Calculated T Axis 11 degrees    Diagnosis       Normal sinus rhythm  Minimal voltage criteria for LVH, may be normal variant  Nonspecific T wave abnormality  When compared with ECG of 14-NOV-2017 10:19,  Nonspecific T wave abnormality, worse in Lateral leads         Radiologic Studies -   No orders to display     Medical Decision Making   I am the first provider for this patient. I reviewed the vital signs, available nursing notes, past medical history, past surgical history, family history and social history. Vital Signs-Reviewed the patient's vital signs.   Patient Vitals for the past 12 hrs:   Temp Pulse Resp BP SpO2   01/28/18 0319 - 80 14 - 97 %   01/28/18 0301 - 80 15 - 98 %   01/28/18 0230 - 78 13 - 99 %   01/28/18 0159 - 88 16 - 95 %   01/28/18 0130 - 77 14 135/63 99 %   01/28/18 0100 - 72 19 127/58 94 %   01/28/18 0030 - 70 20 128/63 95 %   01/27/18 2354 97.9 °F (36.6 °C) 73 22 145/73 97 %       Pulse Oximetry Analysis - 99% on RA    Cardiac Monitor:   Rate: 72 bpm  Rhythm: Normal Sinus Rhythm      EKG interpretation: (Preliminary) 00:37  Rhythm: normal sinus rhythm; and regular . Rate (approx.): 72; Axis: normal; SD interval: normal; QRS interval: normal ; ST/T wave: non-specific changes. Written by Haseeb Rebolledo, ED Scribe, as dictated by Acacia Turner MD.    Records Reviewed: Nursing Notes, Old Medical Records, Ambulance Run Sheet, Previous Radiology Studies and Previous Laboratory Studies    Provider Notes (Medical Decision Making):   Obese female with extensive medical hx presenting with vague symptoms including dyspepsia. Differential includes pancreatitis, dyspepsia, ACS, viral syndrome, low suspicion for intestinal pathology including mesenteric ischemia or bowel obstruction. ED Course:   Initial assessment performed. The patients presenting problems have been discussed, and they are in agreement with the care plan formulated and outlined with them. I have encouraged them to ask questions as they arise throughout their visit. 2:18 AM  Re-evaluated pt. She states she is feeling much better. She has not had any pain or nausea since Zofran and Morphine. No further symptoms. Tolerating pepsi without any difficulty. Discharge Note:  2:18 AM  The patient has been re-evaluated and is ready for discharge. Reviewed available results with patient. Counseled patient on diagnosis and care plan. Patient has expressed understanding, and all questions have been answered. Patient agrees with plan and agrees to follow up as recommended, or to return to the ED if their symptoms worsen. Discharge instructions have been provided and explained to the patient, along with reasons to return to the ED. PLAN:  1. Current Discharge Medication List      START taking these medications    Details   famotidine (PEPCID) 20 mg tablet Take 1 Tab by mouth two (2) times a day. Qty: 20 Tab, Refills: 0           2.    Follow-up Information     Follow up With Details Comments Contact Info    [facility]  If symptoms worsen [address] [place] [address]  [phone]        Return to ED if worse     Diagnosis     Clinical Impression:   1. Abdominal pain, epigastric    2. Dyspepsia        Attestations: This note is prepared by Jonah Min, acting as Scribe for Jd Farah MD.    The scribe's documentation has been prepared under my direction and personally reviewed by me in its entirety. I confirm that the note above accurately reflects all work, treatment, procedures, and medical decision making performed by me.   Jd Farah MD

## 2018-01-28 NOTE — DISCHARGE INSTRUCTIONS

## 2018-01-28 NOTE — ED NOTES
Pt tolerated water. Pt in no acute distress at this time with no complaints. VSS. Call bell within reach.

## 2018-01-28 NOTE — ED TRIAGE NOTES
Pt arrived via EMS complaining of severe sudden onset abdominal pain approximately 2100. Pt also c/o nausea and vomited once before leaving home. Pt denies chest pain or SOB. Will continue to monitor. Call bell within reach.

## 2018-01-28 NOTE — ED NOTES
Bedside shift change report given to Jennifer Quintero RN (oncoming nurse) by nAn Gordon RN (offgoing nurse). Report included the following information SBAR, Kardex, ED Summary, [facility] and Recent Results. Pt waiting for transport back to assisted living facility at this time.

## 2018-02-07 ENCOUNTER — TELEPHONE (OUTPATIENT)
Dept: FAMILY MEDICINE CLINIC | Age: 78
End: 2018-02-07

## 2018-02-07 NOTE — TELEPHONE ENCOUNTER
----- Message from Mimi Hastings sent at 2/6/2018  3:27 PM EST -----  Regarding: Dr. Coty Wood  Pt is requesting a call back regarding leg cramps she has been experiencing.     Best contact number [phone]

## 2018-02-14 NOTE — TELEPHONE ENCOUNTER
Writer called pt back about her leg cramps. Writer spoke with pt, pt verified . Pt stated that she still is currently having leg cramps, but they are not as bad or as frequent as they were when she called. Writer told pt that she would need an office visit to be seen with Dr. Sarah Blake or a NP if Dr. Sarah Blake does not have any open slots. Pt verbalized understanding and stated that she would call the office back and let her know what days she would be able to have someone bring her into the office.

## 2018-02-16 NOTE — TELEPHONE ENCOUNTER
Pt called into office with leg swelling and pain. Diana Amaral, transferred call to writer to be triaged. Writer got on the phone to triage phone call. Pt stated that she was only calling to schedule an appointment to see Dr. Isaac Arndt due to her missing her most recent appointment. Writer then asked pt about sx's she was having, if her legs were hot to the touch, any chest pain, SOB. Pt denied all sx's; stated that her legs were nothing new. Pt stated that she was only calling because she needed an appointment to schedule with Dr. Isaac Arndt. Writer asked pt if she had checked with her brother about transportation and when she would be able to come in to schedule an appointment. Writer stated to pt that per our last phone conversation pt was going to call back with dates that she would be able to come in. Pt stated that she would need something in March and would prefer to see Dr. Isaac Arndt and not a NP. Writer verbalized understanding.  was able to find an appointment on March 2nd, 2018 at 0830am with Dr. Isaac Arndt. Dmitryr took 0845am slot for pt so she would be able to come in. Writer asked pt if that would work for her, pt stated that yes, it would work, and that she should be able to have someone bring her in on that day.  scheduled pt for 03/02/2018 at 0830 with Dr. Isaac Arndt for leg swelling and routine care. Pt read appointment time, date, and provider she would be seeing back to dmitryr. And writer confirmed with pt. Pt verbalized understanding and appreciation.

## 2018-03-15 DIAGNOSIS — F41.8 DEPRESSION WITH ANXIETY: ICD-10-CM

## 2018-03-15 RX ORDER — SERTRALINE HYDROCHLORIDE 50 MG/1
50 TABLET, FILM COATED ORAL DAILY
Qty: 30 TAB | Refills: 11 | Status: SHIPPED | OUTPATIENT
Start: 2018-03-15 | End: 2018-04-05 | Stop reason: ALTCHOICE

## 2018-03-15 NOTE — TELEPHONE ENCOUNTER
PCP: Latrice Barrios DO    Last appt: 2/5/2018  Future Appointments  Date Time Provider Emmy Gutierrez   3/22/2018 11:30 AM Latrice Barrios DO [facility] SCHED   7/5/2018 10:20 AM Flo Alberts MD [address]   7/11/2018 10:30 AM Justyn Painter MD [address] [place] [address]       Requested Prescriptions      No prescriptions requested or ordered in this encounter     Lab Results   Component Value Date/Time    Sodium 140 01/28/2018 12:20 AM    Potassium 3.1 (L) 01/28/2018 12:20 AM    Chloride 102 01/28/2018 12:20 AM    CO2 30 01/28/2018 12:20 AM    Anion gap 8 01/28/2018 12:20 AM    Glucose 193 (H) 01/28/2018 12:20 AM    Glucose 78 08/14/2013 06:00 AM    BUN 14 01/28/2018 12:20 AM    Creatinine 0.96 01/28/2018 12:20 AM    BUN/Creatinine ratio 15 01/28/2018 12:20 AM    GFR est AA >60 01/28/2018 12:20 AM    GFR est non-AA 56 (L) 01/28/2018 12:20 AM    Calcium 8.5 01/28/2018 12:20 AM     Lab Results   Component Value Date/Time    Hemoglobin A1c 6.2 (H) 12/05/2017 12:21 PM    Hemoglobin A1c, External 6.2 06/11/2015     Lab Results   Component Value Date/Time    Cholesterol, total 244 (H) 12/05/2017 12:21 PM    HDL Cholesterol 51 12/05/2017 12:21 PM    LDL, calculated 153 (H) 12/05/2017 12:21 PM    VLDL, calculated 40 12/05/2017 12:21 PM    Triglyceride 199 (H) 12/05/2017 12:21 PM    CHOL/HDL Ratio 4.6 08/14/2013 06:00 AM     Lab Results   Component Value Date/Time    TSH 1.440 12/05/2017 12:21 PM

## 2018-04-05 ENCOUNTER — OFFICE VISIT (OUTPATIENT)
Dept: FAMILY MEDICINE CLINIC | Age: 78
End: 2018-04-05

## 2018-04-05 VITALS
HEIGHT: 61 IN | OXYGEN SATURATION: 95 % | HEART RATE: 72 BPM | TEMPERATURE: 98 F | RESPIRATION RATE: 16 BRPM | SYSTOLIC BLOOD PRESSURE: 128 MMHG | DIASTOLIC BLOOD PRESSURE: 73 MMHG

## 2018-04-05 DIAGNOSIS — L83 ACANTHOSIS NIGRICANS: ICD-10-CM

## 2018-04-05 DIAGNOSIS — F41.8 DEPRESSION WITH ANXIETY: ICD-10-CM

## 2018-04-05 DIAGNOSIS — I25.10 CORONARY ARTERY DISEASE INVOLVING NATIVE CORONARY ARTERY OF NATIVE HEART WITHOUT ANGINA PECTORIS: ICD-10-CM

## 2018-04-05 DIAGNOSIS — E87.6 HYPOKALEMIA: ICD-10-CM

## 2018-04-05 DIAGNOSIS — R14.3 FLATULENCE: ICD-10-CM

## 2018-04-05 DIAGNOSIS — F02.80 SENILE DEMENTIA OF ALZHEIMER'S TYPE (HCC): Chronic | ICD-10-CM

## 2018-04-05 DIAGNOSIS — J30.89 SEASONAL ALLERGIC RHINITIS DUE TO OTHER ALLERGIC TRIGGER: ICD-10-CM

## 2018-04-05 DIAGNOSIS — R74.01 ELEVATED AST (SGOT): ICD-10-CM

## 2018-04-05 DIAGNOSIS — D64.9 LOW HEMOGLOBIN: ICD-10-CM

## 2018-04-05 DIAGNOSIS — R29.898 WEAKNESS OF BOTH LOWER EXTREMITIES: ICD-10-CM

## 2018-04-05 DIAGNOSIS — Z99.3 WHEELCHAIR DEPENDENT: ICD-10-CM

## 2018-04-05 DIAGNOSIS — G30.1 SENILE DEMENTIA OF ALZHEIMER'S TYPE (HCC): Chronic | ICD-10-CM

## 2018-04-05 DIAGNOSIS — Z78.9 STATIN INTOLERANCE: ICD-10-CM

## 2018-04-05 DIAGNOSIS — R26.81 GAIT INSTABILITY: ICD-10-CM

## 2018-04-05 DIAGNOSIS — M48.061 SPINAL STENOSIS OF LUMBAR REGION WITHOUT NEUROGENIC CLAUDICATION: ICD-10-CM

## 2018-04-05 DIAGNOSIS — I10 ESSENTIAL HYPERTENSION: Primary | ICD-10-CM

## 2018-04-05 DIAGNOSIS — R10.13 EPIGASTRIC ABDOMINAL PAIN: ICD-10-CM

## 2018-04-05 DIAGNOSIS — Z87.39 HISTORY OF ROTATOR CUFF SYNDROME: ICD-10-CM

## 2018-04-05 DIAGNOSIS — M79.603 UPPER EXTREMITY PAIN, INFERIOR, UNSPECIFIED LATERALITY: ICD-10-CM

## 2018-04-05 DIAGNOSIS — M54.2 NECK PAIN ON RIGHT SIDE: ICD-10-CM

## 2018-04-05 DIAGNOSIS — R41.3 MEMORY LOSS: ICD-10-CM

## 2018-04-05 DIAGNOSIS — M79.89 ARM SWELLING: ICD-10-CM

## 2018-04-05 DIAGNOSIS — M17.0 PRIMARY OSTEOARTHRITIS OF BOTH KNEES: ICD-10-CM

## 2018-04-05 DIAGNOSIS — R73.9 HYPERGLYCEMIA: ICD-10-CM

## 2018-04-05 DIAGNOSIS — I87.8 LOWER EXTREMITY VENOUS STASIS: ICD-10-CM

## 2018-04-05 NOTE — PROGRESS NOTES
Uli Orozco is a 76 y.o. female      Chief Complaint   Patient presents with   [facility] Follow Up     1/27/18 @ [address] for Abdominal Pain and Dyspepsia    Other     Bilateral Dark Fingers    Leg Swelling     Bilateral Leg / Foot Swelling.  Arm swelling     Bilateral Arm Swelling and Burning     Cyst     Bilateral Legs Knot       1. Have you been to the ER, urgent care clinic since your last visit? Yes, ER 1/27/18 at [address] for Abdominal Pain / Dyspepsia  Hospitalized since your last visit? No    2. Have you seen or consulted any other health care providers outside of the [facility] since your last visit? Include any pap smears or colon screening.  No

## 2018-04-05 NOTE — PROGRESS NOTES
HISTORY OF PRESENT ILLNESS  Kyaw Agarwal is a 76 y.o. female presents with ED Follow-up (1/27/18 @ [facility] for Abdominal Pain and Dyspepsia); Skin Problem (Bilateral Dark Fingers); Leg Swelling (Bilateral Leg / Foot Swelling. ); Arm swelling (Bilateral Arm Swelling and Burning ); Cyst (Bilateral Legs Knot); and Referral Follow Up    Agree with nurse note. Hypertensive, hyperglycemic pt with CAD, statin intolerance, and elevated AST presents to the office with a BP of 128/73. She saw cardiologist, Dr. Gena Urias on 01/09/18 and noted she was doing well from a cardiac perspective. F/U 6 months. Pt with dementia and depression with anxiety. She saw neurologist, Dr. Melany Monson on 01/02/18 for memory. Dx'd with dementia in 2015. Dr. Lencho Calero noted that MMSE exam has improved overtime and has not significantly declined since 2013. She was thinking that pt may have had a pseudodementia related to mood disorder. Will continue to monitor and hold off on starting dementia meds now. Recommended memory labs, B12/MMA at next lab draw with PCP. F/U 6 months. She is no longer taking Zoloft. She was taken to the ER via squad on 01/27/18 for abdominal pain, nausea, and vomitting. K 3.1. Glucose 193. . Cr 0.96. Lipase 240. WBC 10.1. Hgb 11.4. Dx'd abdominal pain and dyspepsia. Tx'd with Morphine 2 mg IV, Zofran 8 mg IV, and Klor-Con 40 mEq orally. Rx'd Pepcid 20 mg. Resolved. Wheelchair bound pt with LE venous stasis, gait instability, LE weakness, BL knee OA, and lumbar spinal stenosis. She does not stand much and uses a sliding board to get from her bed into her wheelchair. She has assistance with washing her legs and back. She feels her legs are swelling more and her legs feel weak sometimes. She has also noticed knots in her legs. Her L knee hurts off and on, which improves with Tylenol. She is interested in having PT where she lives [facility]). Pt with hx of rotator cuff syndrome.  She has pain extending from the R side of her neck into her arm. She also has R arm axillary and shoulder pain which she describes as \"achy. \" Her pain worsens with raising her R arm. She is also concerned about arm swelling, R>L. Denies fall, trauma, chest pain, SOB, or other associated sxs. She has a cough with mucus, itchy/watery eyes, runny nose with clear mucus since this morning. She is not taking anything for allergies. Patient denies fever, chills, ear pain, dizziness, post nasal drainage, sore throat, headache, chest pain or tightness, SOB, wheezing, GI symptoms, bladder symptoms and body aches. She mentions passing a lot of gas regardless of what she eats. Denies diarrhea, constipation, abdominal pain, nausea, vomiting, or other associated sxs. She is concerned about her fingers being darker than the rest of her skin. Denies hand swelling. Written by denisse Chaney, as dictated by Dr. Pop Childers DO.    ROS    Review of Systems negative except as noted above in HPI. ALLERGIES:    Allergies   Allergen Reactions    Aristocort [Triamcinolone] Hives     Aristocort shot    Codeine Itching    Lasix [Furosemide] Itching    Bupropion Other (comments)     Severe headache    Darvocet A500 [Propoxyphene N-Acetaminophen] Other (comments)     headache    Influenza Virus Vaccine Whole Other (comments)     pharyngitis    Mevacor [Lovastatin] Other (comments)     myalgias    Percocet [Oxycodone-Acetaminophen] Itching     Worse headache    Rhinocort Other (comments)     nosebleed    Simvastatin Other (comments)     myalgias       CURRENT MEDICATIONS:    Outpatient Prescriptions Marked as Taking for the 4/5/18 encounter (Office Visit) with Josi Archer DO   Medication Sig Dispense Refill    aspirin 81 mg chewable tablet Take 1 Tab by mouth daily.  Indications: prevention of cerebrovascular accident 80 Tab 3    OTHER Please dispense an Ultra light Wheelchair DX.severe osteoarthritis, fibromyalgia, Bell's Palsy, DJD,muscle weakness, lumbar stenosis, knee and shoulder pain 1 Device 0    amlodipine (NORVASC) 2.5 mg tablet Take 1 tablet by mouth daily. 30 tablet 11    bumetanide (BUMEX) 1 mg tablet TAKE 1 TABLET BY MOUTH EVERY DAY 30 tablet 11    lisinopril (PRINIVIL, ZESTRIL) 5 mg tablet Take 1 tablet by mouth daily. 30 tablet 11       PAST MEDICAL HISTORY:    Past Medical History:   Diagnosis Date    Abnormal cardiovascular stress test 04/18/08    Dr. Flora Martins    Allergy, unspecified not elsewhere classified     Bell's palsy 02/2005    Left. Dr. Pop Groves CAD (coronary artery disease)     Dr. Holli Bream Dr. Lahoma Fothergill. Dr. Harmony Vega Chest pain 2002    Dr. Dallas Dickinson Chronic headache 2013    due to depression. Dr. Zohra Wood.  Chronic pain     LEGS AND FEET; BACK UNDER RIGHT SHOULDER    Congenital eye disorder     Lazy eyes. Dr. Niecy Flores.  Depression 2011    Dr. Heike Cross (psychologist). Dr. Zohra Wood. Dr. Adalid Taylor.  DJD (degenerative joint disease)     knees, low back. Dr. Roque Montague. Dr. Zenobia Weathers. Dr. Kierra River Gait instability 02/10/09    Dr. Marie Winchester. Dr. Zohra Wood.  Gallstones     GERD (gastroesophageal reflux disease)     Hiatal hernia 1977    Hypercholesteremia     Hypertension     Ill-defined condition     \"JUST ONE NIGHT I WENT TO STAND UP AND I JUST COULDN'T STAND\"  PT NOT SURE WHY    Leg weakness 12/2009    Dr. Kain Flores. Dr. Chirag Li.  Lower extremity venous stasis     Dr. Judy Partida    Lumbar spinal stenosis 12/2009    L3-5. Dr. Ayesha Luciano. PARAPLEGIA SINCE 2012    Lymphedema 12/19/08    Dr. Karen Fish then Dr. Bernabe Pavon Memory loss 2013    Dr. Robbi Sanders.  Menopause     Migraine     Ocular. Dr. Roxie Santizo    NORMA (obstructive sleep apnea) 01/24/07    Dr. Gustavo Ann.  NO CPAP    Osteoporosis 02/10/09    Dr. Gely Francis    Pancreatitis 03/2011    Post-menopausal bleeding 07/2016    due to Dr. Alexa Wyman Rotator cuff injury     Left. Dr. Lupe Ann; chronic bilateral    Seborrheic keratosis 2005    Dr. Bina Page 02/05/09    Dr. Darren Comer   [facility] Tremor 2013    Dr. Jenna Perla.  Unspecified vitamin D deficiency 07/2009    Urinary incontinence 2013    Dr. Kyaw Weathers Venous insufficiency 1998    R>L. with chronic leg edema. Dr. Gustavo Saldana       PAST SURGICAL HISTORY:    Past Surgical History:   Procedure Laterality Date    HX HEART CATHETERIZATION  06/2008    Dr. Maria Fernanda Randle. due to abnormal Stress Test    HX MAR AND BSO  09/14/2016    Dr. Suhail Kern, [place] HYSTEROSCOPIC MYOMECTOMY  07/27/2016    D AND C WITH SYMPHION, RESECTION OF ENDOMETRIAL TISSUE. benign. due to postmenopausal bleeding. Dr. Valeriano Clay. 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   Pt lived with smoker dad x 40 yrs    Alcohol use No    Drug use: No    Sexual activity: Yes     Partners: Male     Birth control/ protection: Condom     Other Topics Concern    None     Social History Narrative    L[facility] in [facility] [address]:    Immunization History   Administered Date(s) Administered    TB Skin Test (PPD) Intradermal 08/05/2013    TD Vaccine 03/28/2008         PHYSICAL EXAMINATION    Vital Signs    Visit Vitals    /73 (BP 1 Location: Left arm, BP Patient Position: Sitting)    Pulse 72    Temp 98 °F (36.7 °C) (Oral)    Resp 16    Ht 5' 1\" (1.549 m)    SpO2 95%       Weight Metrics 4/5/2018 1/27/2018 1/9/2018 1/2/2018 12/5/2017 11/14/2017 8/11/2017   Weight - 290 lb - 264 lb - 264 lb 12.4 oz 273 lb   BMI - 54.8 kg/m2 - 49.88 kg/m2 - 50.03 kg/m2 51.58 kg/m2   Some encounter information is confidential and restricted. Go to Review Flowsheets activity to see all data. General appearance - Well nourished. Well appearing. Well developed. No acute distress. Obese. Head - Normocephalic. Atraumatic. Eyes - pupils equal and reactive. Sclera anicteric. Mildly injected sclera. Laxed EOM noted  Ears - Hearing is grossly normal bilaterally. Nose - normal and patent. No polyps noted. No erythema. No discharge. Mouth - mucous membranes with adequate moisture. Posterior pharynx normal with cobblestone appearance. No erythema, white exudate or obstruction. Neck - supple. Midline trachea. No carotid bruits noted bilaterally. No thyromegaly noted. Chest - clear to auscultation bilaterally anteriorly and posteriorly. No wheezes. No rales or rhonchi. Breath sounds are symmetrical bilaterally. Unlabored respirations. Heart - normal rate. Regular rhythm. Normal S1, S2.  3/6 murmur noted. No rubs, clicks or gallops noted. Abdomen - soft and distended. No masses or organomegaly. No rebound, rigidity or guarding. Bowel sounds normal x 4 quadrants. No tenderness noted. Neurological - awake, alert and oriented to person, place, and time and event. Cranial nerves II through XII intact. Clear speech.   Muscle strength is +5/5 x 4 extremities. Sensation is intact to light touch bilaterally. Wheelchair bound pt. Heme/Lymph - chronic BL LE edema is noted. Musculoskeletal - Intact x 4 extremities. Decreased ROM x 4 extremities. BL LE and shoulder pain with movement. Pain on palpation in RUE, R shoulder girdle, and L elbow. Pain on palpation at BL hips where wheelchair hits her. Pain on palpation at BL knees and BL legs with chronic skin changes noted due to chronic BL edema. Back exam - normal range of motion. No pain on palpation of the spinous processes in the cervical, thoracic, lumbar, sacral regions. No CVA tenderness. Skin - no rashes, erythema, ecchymosis, lacerations, abrasions, suspicious moles noted. Acanthosis nigricans noted across BL fingers. Psychological -   normal behavior, dress and thought processes. Good insight. Good eye contact. Normal affect. Appropriate mood. Normal speech. DATA REVIEWED    Lab Results   Component Value Date/Time    WBC 10.1 01/28/2018 12:20 AM    Hemoglobin (POC) 12.6 12/26/2009 09:27 PM    HGB 11.4 (L) 01/28/2018 12:20 AM    Hematocrit (POC) 37 12/26/2009 09:27 PM    HCT 37.2 01/28/2018 12:20 AM    PLATELET 557 10/44/0573 12:20 AM    MCV 92.5 01/28/2018 12:20 AM     Lab Results   Component Value Date/Time    Sodium 140 01/28/2018 12:20 AM    Potassium 3.1 (L) 01/28/2018 12:20 AM    Chloride 102 01/28/2018 12:20 AM    CO2 30 01/28/2018 12:20 AM    Anion gap 8 01/28/2018 12:20 AM    Glucose 193 (H) 01/28/2018 12:20 AM    Glucose 78 08/14/2013 06:00 AM    BUN 14 01/28/2018 12:20 AM    Creatinine 0.96 01/28/2018 12:20 AM    BUN/Creatinine ratio 15 01/28/2018 12:20 AM    GFR est AA >60 01/28/2018 12:20 AM    GFR est non-AA 56 (L) 01/28/2018 12:20 AM    Calcium 8.5 01/28/2018 12:20 AM    Bilirubin, total 0.4 01/28/2018 12:20 AM    AST (SGOT) 107 (H) 01/28/2018 12:20 AM    Alk.  phosphatase 110 01/28/2018 12:20 AM    Protein, total 6.9 01/28/2018 12:20 AM    Albumin 3.1 (L) 01/28/2018 12:20 AM    Globulin 3.8 01/28/2018 12:20 AM    A-G Ratio 0.8 (L) 01/28/2018 12:20 AM    ALT (SGPT) 42 01/28/2018 12:20 AM     Lab Results   Component Value Date/Time    Cholesterol, total 244 (H) 12/05/2017 12:21 PM    HDL Cholesterol 51 12/05/2017 12:21 PM    LDL, calculated 153 (H) 12/05/2017 12:21 PM    VLDL, calculated 40 12/05/2017 12:21 PM    Triglyceride 199 (H) 12/05/2017 12:21 PM    CHOL/HDL Ratio 4.6 08/14/2013 06:00 AM     Lab Results   Component Value Date/Time    Vitamin D 25-Hydroxy 24 (L) 08/05/2010 03:54 PM    VITAMIN D, 25-HYDROXY 34.3 12/05/2017 12:21 PM       Lab Results   Component Value Date/Time    Hemoglobin A1c 6.2 (H) 12/05/2017 12:21 PM    Hemoglobin A1c, External 6.2 06/11/2015     Lab Results   Component Value Date/Time    TSH 1.440 12/05/2017 12:21 PM     Lab Results   Component Value Date/Time    Microalb/Creat ratio (ug/mg creat.) 6.8 01/20/2014 10:22 AM     Lab Results   Component Value Date/Time    Vitamin B12 959 (H) 08/14/2013 06:00 AM    Folate 29.6 (H) 08/14/2013 06:00 AM       ASSESSMENT and PLAN      ICD-10-CM ICD-9-CM    1. Essential hypertension I10 401.9    2. Seasonal allergic rhinitis due to other allergic trigger J30.89 477.8    3. Hypokalemia E87.6 276.8     due to vomiting vs other    4. Memory loss R41.3 780.93    5. Elevated AST (SGOT) R74.0 790.4    6. Senile dementia of Alzheimer's type G30.1 331.0     F02.80 294.10     improving and stable since 2013   7. Coronary artery disease involving native coronary artery of native heart without angina pectoris I25.10 414.01    8. Neck pain on right side M54.2 723.1 REFERRAL TO PHYSICAL THERAPY    with radiation into R hand, intermittently   9. Upper extremity pain, inferior, unspecified laterality M79.603 729.5 REFERRAL TO PHYSICAL THERAPY    BL due to neck vs old Rotator Cuff injuries vs other   10. Lower extremity venous stasis I87.8 459.81 REFERRAL TO LYMPHEDEMA CLINIC   11.  Arm swelling M79.89 729.81 REFERRAL TO LYMPHEDEMA CLINIC    Bilaterally? 12. Acanthosis nigricans L83 701.2    13. Wheelchair dependent Z99.3 V46.3    14. Statin intolerance Z78.9 995.27    15. Hyperglycemia R73.9 790.29    16. Gait instability R26.81 781.2 REFERRAL TO PHYSICAL THERAPY   17. Spinal stenosis of lumbar region without neurogenic claudication M48.061 724.02 REFERRAL TO PHYSICAL THERAPY   18. Weakness of both lower extremities R29.898 729.89 REFERRAL TO PHYSICAL THERAPY    due to lumbar stenosis vs deconditioning vs BL knee OA pain vs other   19. History of rotator cuff syndrome Z87.39 V13.59     Bilaterally   20. Epigastric abdominal pain R10.13 789.06     due to dyspepsia, resolved after Pepcid   21. Primary osteoarthritis of both knees M17.0 715.16     severe x years, non operable per ortho   22. Depression with anxiety F41.8 300.4    23. Low hemoglobin D64.9 285.9    24. Flatulence R14.3 787. 3        Discussed the patient's BMI with her. The BMI follow up plan is as follows: I have counseled this patient on diet and exercise regimens. Decrease carbohydrates (white foods, sweet foods, sweet drinks and alcohol), increase green leafy vegetables and protein (lean meats and beans) with each meal.  Avoid fried foods. Eat 3-5 small meals daily. Do not skip meals. Increase water intake. Increase physical activity to 30 minutes daily for health benefit, as tolerated. Get 7-8 hours uninterrupted sleep nightly. Chart reviewed and updated. Continue current medications and care. Cautioned pt not to exceed 3 g of Tylenol daily. Most recent tests reviewed. Recent office visit notes from ER, Dr. Dasha Barth and Dr. Alberto Lawson reviewed. Referrals given; patient urged to keep appointments with specialists. [facility]hedema Clinic. PT. Counseled patient on health concerns:  Acanthosis nigricans, allergies, and edema. Relevant handouts given and discussed with patient. Immunizations noted.   Offered empathy, support, legitimation, prayers, partnership to patient. Praised patient for progress. Follow-up Disposition:  Return in about 3 months (around 7/5/2018) for swelling, neck pain, arm pain . Patient was offered a choice/choices in the treatment plan today. Patient expresses understanding of the plan and agrees with recommendations. More than 40 mins spent face to face with patient and more than 50% of this time spent in counseling and coordinating care. Written by denisse Zamora, as dictated by Dr. Nyle Lefort, DO. Documentation True and Accepted by Cielo An. Patient Instructions          Knee Arthritis: Exercises  Your Care Instructions  Here are some examples of exercises for knee arthritis. Start each exercise slowly. Ease off the exercise if you start to have pain. Your doctor or physical therapist will tell you when you can start these exercises and which ones will work best for you. How to do the exercises  Knee flexion with heel slide    1. Lie on your back with your knees bent. 2. Slide your heel back by bending your affected knee as far as you can. Then hook your other foot around your ankle to help pull your heel even farther back. 3. Hold for about 6 seconds, then rest for up to 10 seconds. 4. Repeat 8 to 12 times. 5. Switch legs and repeat steps 1 through 4, even if only one knee is sore. Quad sets    1. Sit with your affected leg straight and supported on the floor or a firm bed. Place a small, rolled-up towel under your knee. Your other leg should be bent, with that foot flat on the floor. 2. Tighten the thigh muscles of your affected leg by pressing the back of your knee down into the towel. 3. Hold for about 6 seconds, then rest for up to 10 seconds. 4. Repeat 8 to 12 times. 5. Switch legs and repeat steps 1 through 4, even if only one knee is sore. Straight-leg raises to the front    1.  Lie on your back with your good knee bent so that your foot rests flat on the floor. Your affected leg should be straight. Make sure that your low back has a normal curve. You should be able to slip your hand in between the floor and the small of your back, with your palm touching the floor and your back touching the back of your hand. 2. Tighten the thigh muscles in your affected leg by pressing the back of your knee flat down to the floor. Hold your knee straight. 3. Keeping the thigh muscles tight and your leg straight, lift your affected leg up so that your heel is about 12 inches off the floor. Hold for about 6 seconds, then lower slowly. 4. Relax for up to 10 seconds between repetitions. 5. Repeat 8 to 12 times. 6. Switch legs and repeat steps 1 through 5, even if only one knee is sore. Active knee flexion    1. Lie on your stomach with your knees straight. If your kneecap is uncomfortable, roll up a washcloth and put it under your leg just above your kneecap. 2. Lift the foot of your affected leg by bending the knee so that you bring the foot up toward your buttock. If this motion hurts, try it without bending your knee quite as far. This may help you avoid any painful motion. 3. Slowly move your leg up and down. 4. Repeat 8 to 12 times. 5. Switch legs and repeat steps 1 through 4, even if only one knee is sore. Quadriceps stretch (facedown)    1. Lie flat on your stomach, and rest your face on the floor. 2. Wrap a towel or belt strap around the lower part of your affected leg. Then use the towel or belt strap to slowly pull your heel toward your buttock until you feel a stretch. 3. Hold for about 15 to 30 seconds, then relax your leg against the towel or belt strap. 4. Repeat 2 to 4 times. 5. Switch legs and repeat steps 1 through 4, even if only one knee is sore. Stationary exercise bike    1. If you do not have a stationary exercise bike at home, you can find one to ride at your local health club or community center.   2. Adjust the height of the bike seat so that your knee is slightly bent when your leg is extended downward. If your knee hurts when the pedal reaches the top, you can raise the seat so that your knee does not bend as much. 3. Start slowly. At first, try to do 5 to 10 minutes of cycling with little to no resistance. Then increase your time and the resistance bit by bit until you can do 20 to 30 minutes without pain. 4. If you start to have pain, rest your knee until your pain gets back to the level that is normal for you. Or cycle for less time or with less effort. Follow-up care is a key part of your treatment and safety. Be sure to make and go to all appointments, and call your doctor if you are having problems. It's also a good idea to know your test results and keep a list of the medicines you take. Where can you learn more? Go to http://luigiViralheatatif.info/. Enter C159 in the search box to learn more about \"Knee Arthritis: Exercises. \"  Current as of: March 21, 2017  Content Version: 11.4  © 6797-5915 [employer]. Care instructions adapted under license by [employer] (which disclaims liability or warranty for this information). If you have questions about a medical condition or this instruction, always ask your healthcare professional. [address] any warranty or liability for your use of this information. Shoulder Arthritis: Exercises  Your Care Instructions  Here are some examples of typical rehabilitation exercises for your condition. Start each exercise slowly. Ease off the exercise if you start to have pain. Your doctor or physical therapist will tell you when you can start these exercises and which ones will work best for you. How to do the exercises  Shoulder flexion (lying down)    To make a wand for this exercise, use a piece of PVC pipe or a broom handle with the broom removed. Make the wand about a foot wider than your shoulders.   6. Lie on your back, holding a wand with both hands. Your palms should face down as you hold the wand. 7. Keeping your elbows straight, slowly raise your arms over your head. Raise them until you feel a stretch in your shoulders, upper back, and chest.  8. Hold for 15 to 30 seconds. 9. Repeat 2 to 4 times. Shoulder rotation (lying down)    To make a wand for this exercise, use a piece of PVC pipe or a broom handle with the broom removed. Make the wand about a foot wider than your shoulders. 6. Lie on your back. Hold a wand with both hands with your elbows bent and palms up. 7. Keep your elbows close to your body, and move the wand across your body toward the sore arm. 8. Hold for 8 to 12 seconds. 9. Repeat 2 to 4 times. Shoulder internal rotation with towel    7. Hold a towel above and behind your head with the arm that is not sore. 8. With your sore arm, reach behind your back and grasp the towel. 9. With the arm above your head, pull the towel upward. Do this until you feel a stretch on the front and outside of your sore shoulder. 10. Hold 15 to 30 seconds. 11. Repeat 2 to 4 times. Shoulder blade squeeze    6. Stand with your arms at your sides, and squeeze your shoulder blades together. Do not raise your shoulders up as you squeeze. 7. Hold 6 seconds. 8. Repeat 8 to 12 times. Resisted rows    For this exercise, you will need elastic exercise material, such as surgical tubing or Thera-Band. 6. Put the band around a solid object at about waist level. (A bedpost will work well.) Each hand should hold an end of the band. 7. With your elbows at your sides and bent to 90 degrees, pull the band back. Your shoulder blades should move toward each other. Return to the starting position. 8. Repeat 8 to 12 times. External rotator strengthening exercise    5. Start by tying a piece of elastic exercise material to a doorknob. You can use surgical tubing or Thera-Band. (You may also hold one end of the band in each hand.)  6.  Stand or sit with your shoulder relaxed and your elbow bent 90 degrees. Your upper arm should rest comfortably against your side. Squeeze a rolled towel between your elbow and your body for comfort. This will help keep your arm at your side. 7. Hold one end of the elastic band with the hand of the painful arm. 8. Start with your forearm across your belly. Slowly rotate the forearm out away from your body. Keep your elbow and upper arm tucked against the towel roll or the side of your body until you begin to feel tightness in your shoulder. Slowly move your arm back to where you started. 9. Repeat 8 to 12 times. Internal rotator strengthening exercise    1. Start by tying a piece of elastic exercise material to a doorknob. You can use surgical tubing or Thera-Band. 2. Stand or sit with your shoulder relaxed and your elbow bent 90 degrees. Your upper arm should rest comfortably against your side. Squeeze a rolled towel between your elbow and your body for comfort. This will help keep your arm at your side. 3. Hold one end of the elastic band in the hand of the painful arm. 4. Slowly rotate your forearm toward your body until it touches your belly. Slowly move it back to where you started. 5. Keep your elbow and upper arm firmly tucked against the towel roll or at your side. 6. Repeat 8 to 12 times. Pendulum swing    If you have pain in your back, do not do this exercise. 1. Hold on to a table or the back of a chair with your good arm. Then bend forward a little and let your sore arm hang straight down. This exercise does not use the arm muscles. Rather, use your legs and your hips to create movement that makes your arm swing freely. 2. Use the movement from your hips and legs to guide the slightly swinging arm back and forth like a pendulum (or elephant trunk). Then guide it in circles that start small (about the size of a dinner plate). Make the circles a bit larger each day, as your pain allows.   3. Do this exercise for 5 minutes, 5 to 7 times each day. 4. As you have less pain, try bending over a little farther to do this exercise. This will increase the amount of movement at your shoulder. Follow-up care is a key part of your treatment and safety. Be sure to make and go to all appointments, and call your doctor if you are having problems. It's also a good idea to know your test results and keep a list of the medicines you take. Where can you learn more? Go to http://luigi-atif.info/. Enter H562 in the search box to learn more about \"Shoulder Arthritis: Exercises. \"  Current as of: March 21, 2017  Content Version: 11.4  © 2672-9279 [employer]. Care instructions adapted under license by [employer] (which disclaims liability or warranty for this information). If you have questions about a medical condition or this instruction, always ask your healthcare professional. [place] [address] any warranty or liability for your use of this information. Rotator Cuff: Exercises  Your Care Instructions  Here are some examples of typical rehabilitation exercises for your condition. Start each exercise slowly. Ease off the exercise if you start to have pain. Your doctor or physical therapist will tell you when you can start these exercises and which ones will work best for you. How to do the exercises  Pendulum swing    If you have pain in your back, do not do this exercise. 10. Hold on to a table or the back of a chair with your good arm. Then bend forward a little and let your sore arm hang straight down. This exercise does not use the arm muscles. Rather, use your legs and your hips to create movement that makes your arm swing freely. 11. Use the movement from your hips and legs to guide the slightly swinging arm back and forth like a pendulum (or elephant trunk). Then guide it in circles that start small (about the size of a dinner plate).  Make the circles a bit larger each day, as your pain allows. 12. Do this exercise for 5 minutes, 5 to 7 times each day. 13. As you have less pain, try bending over a little farther to do this exercise. This will increase the amount of movement at your shoulder. Posterior stretching exercise    10. Hold the elbow of your injured arm with your other hand. 11. Use your hand to pull your injured arm gently up and across your body. You will feel a gentle stretch across the back of your injured shoulder. 12. Hold for at least 15 to 30 seconds. Then slowly lower your arm. 13. Repeat 2 to 4 times. Up-the-back stretch    Your doctor or physical therapist may want you to wait to do this stretch until you have regained most of your range of motion and strength. You can do this stretch in different ways. Hold any of these stretches for at least 15 to 30 seconds. Repeat them 2 to 4 times. 12. Put your hand in your back pocket. Let it rest there to stretch your shoulder. 13. With your other hand, hold your injured arm (palm outward) behind your back by the wrist. Pull your arm up gently to stretch your shoulder. 14. Next, put a towel over your other shoulder. Put the hand of your injured arm behind your back. Now hold the back end of the towel. With the other hand, hold the front end of the towel in front of your body. Pull gently on the front end of the towel. This will bring your hand farther up your back to stretch your shoulder. Overhead stretch    9. Standing about an arm's length away, grasp onto a solid surface. You could use a countertop, a doorknob, or the back of a sturdy chair. 10. With your knees slightly bent, bend forward with your arms straight. Lower your upper body, and let your shoulders stretch. 11. As your shoulders are able to stretch farther, you may need to take a step or two backward. 12. Hold for at least 15 to 30 seconds. Then stand up and relax.  If you had stepped back during your stretch, step forward so you can keep your hands on the solid surface. 13. Repeat 2 to 4 times. Shoulder flexion (lying down)    To make a wand for this exercise, use a piece of PVC pipe or a broom handle with the broom removed. Make the wand about a foot wider than your shoulders. 9. Lie on your back, holding a wand with both hands. Your palms should face down as you hold the wand. 10. Keeping your elbows straight, slowly raise your arms over your head. Raise them until you feel a stretch in your shoulders, upper back, and chest.  11. Hold for 15 to 30 seconds. 12. Repeat 2 to 4 times. Shoulder rotation (lying down)    To make a wand for this exercise, use a piece of PVC pipe or a broom handle with the broom removed. Make the wand about a foot wider than your shoulders. 10. Lie on your back. Hold a wand with both hands with your elbows bent and palms up. 11. Keep your elbows close to your body, and move the wand across your body toward the sore arm. 12. Hold for 8 to 12 seconds. 13. Repeat 2 to 4 times. Wall climbing (to the side)    Avoid any movement that is straight to your side, and be careful not to arch your back. Your arm should stay about 30 degrees to the front of your side. 7. Stand with your side to a wall so that your fingers can just touch it at an angle about 30 degrees toward the front of your body. 8. Walk the fingers of your injured arm up the wall as high as pain permits. Try not to shrug your shoulder up toward your ear as you move your arm up. 9. Hold that position for a count of at least 15 to 20.  10. Walk your fingers back down to the starting position. 11. Repeat at least 2 to 4 times. Try to reach higher each time. Wall climbing (to the front)    During this stretching exercise, be careful not to arch your back. 5. Face a wall, and stand so your fingers can just touch it. 6. Keeping your shoulder down, walk the fingers of your injured arm up the wall as high as pain permits.  (Don't shrug your shoulder up toward your ear.)  7. Hold your arm in that position for at least 15 to 30 seconds. 8. Slowly walk your fingers back down to where you started. 9. Repeat at least 2 to 4 times. Try to reach higher each time. Shoulder blade squeeze    1. Stand with your arms at your sides, and squeeze your shoulder blades together. Do not raise your shoulders up as you squeeze. 2. Hold 6 seconds. 3. Repeat 8 to 12 times. Scapular exercise: Arm reach    1. Lie flat on your back. This exercise is a very slight motion that starts with your arms raised (elbows straight, arms straight). 2. From this position, reach higher toward the ellen or ceiling. Keep your elbows straight. All motion should be from your shoulder blade only. 3. Relax your arms back to where you started. 4. Repeat 8 to 12 times. Arm raise to the side    During this strengthening exercise, your arm should stay about 30 degrees to the front of your side. 1. Slowly raise your injured arm to the side, with your thumb facing up. Raise your arm 60 degrees at the most (shoulder level is 90 degrees). 2. Hold the position for 3 to 5 seconds. Then lower your arm back to your side. If you need to, bring your \"good\" arm across your body and place it under the elbow as you lower your injured arm. Use your good arm to keep your injured arm from dropping down too fast.  3. Repeat 8 to 12 times. 4. When you first start out, don't hold any extra weight in your hand. As you get stronger, you may use a 1-pound to 2-pound dumbbell or a small can of food. Shoulder flexor and extensor exercise    These are isometric exercises. That means you contract your muscles without actually moving. 1. Push forward (flex): Stand facing a wall or doorjamb, about 6 inches or less back. Hold your injured arm against your body. Make a closed fist with your thumb on top. Then gently push your hand forward into the wall with about 25% to 50% of your strength.  Don't let your body move backward as you push. Hold for about 6 seconds. Relax for a few seconds. Repeat 8 to 12 times. 2. Push backward (extend): Stand with your back flat against a wall. Your upper arm should be against the wall, with your elbow bent 90 degrees (your hand straight ahead). Push your elbow gently back against the wall with about 25% to 50% of your strength. Don't let your body move forward as you push. Hold for about 6 seconds. Relax for a few seconds. Repeat 8 to 12 times. Scapular exercise: Wall push-ups    This exercise is best done with your fingers somewhat turned out, rather than straight up and down. 1. Stand facing a wall, about 12 inches to 18 inches away. 2. Place your hands on the wall at shoulder height. 3. Slowly bend your elbows and bring your face to the wall. Keep your back and hips straight. 4. Push back to where you started. 5. Repeat 8 to 12 times. 6. When you can do this exercise against a wall comfortably, you can try it against a counter. You can then slowly progress to the end of a couch, then to a sturdy chair, and finally to the floor. Scapular exercise: Retraction    For this exercise, you will need elastic exercise material, such as surgical tubing or Thera-Band. 1. Put the band around a solid object at about waist level. (A bedpost will work well.) Each hand should hold an end of the band. 2. With your elbows at your sides and bent to 90 degrees, pull the band back. Your shoulder blades should move toward each other. Then move your arms back where you started. 3. Repeat 8 to 12 times. 4. If you have good range of motion in your shoulders, try this exercise with your arms lifted out to the sides. Keep your elbows at a 90-degree angle. Raise the elastic band up to about shoulder level. Pull the band back to move your shoulder blades toward each other. Then move your arms back where you started. Internal rotator strengthening exercise    1.  Start by tying a piece of elastic exercise material to a doorknob. You can use surgical tubing or Thera-Band. 2. Stand or sit with your shoulder relaxed and your elbow bent 90 degrees. Your upper arm should rest comfortably against your side. Squeeze a rolled towel between your elbow and your body for comfort. This will help keep your arm at your side. 3. Hold one end of the elastic band in the hand of the painful arm. 4. Slowly rotate your forearm toward your body until it touches your belly. Slowly move it back to where you started. 5. Keep your elbow and upper arm firmly tucked against the towel roll or at your side. 6. Repeat 8 to 12 times. External rotator strengthening exercise    1. Start by tying a piece of elastic exercise material to a doorknob. You can use surgical tubing or Thera-Band. (You may also hold one end of the band in each hand.)  2. Stand or sit with your shoulder relaxed and your elbow bent 90 degrees. Your upper arm should rest comfortably against your side. Squeeze a rolled towel between your elbow and your body for comfort. This will help keep your arm at your side. 3. Hold one end of the elastic band with the hand of the painful arm. 4. Start with your forearm across your belly. Slowly rotate the forearm out away from your body. Keep your elbow and upper arm tucked against the towel roll or the side of your body until you begin to feel tightness in your shoulder. Slowly move your arm back to where you started. 5. Repeat 8 to 12 times. Follow-up care is a key part of your treatment and safety. Be sure to make and go to all appointments, and call your doctor if you are having problems. It's also a good idea to know your test results and keep a list of the medicines you take. Where can you learn more? Go to http://luigi-atif.info/. Enter [employer] in the search box to learn more about \"Rotator Cuff: Exercises. \"  Current as of: March 21, 2017  Content Version: 11.4  © 7504-7546 [employer].  Care instructions adapted under license by [employer] (which disclaims liability or warranty for this information). If you have questions about a medical condition or this instruction, always ask your healthcare professional. [address] any warranty or liability for your use of this information. Healthy Upper Back: Exercises  Your Care Instructions  Here are some examples of exercises for your upper back. Start each exercise slowly. Ease off the exercise if you start to have pain. Your doctor or physical therapist will tell you when you can start these exercises and which ones will work best for you. How to do the exercises  Lower neck and upper back stretch    14. Stretch your arms out in front of your body. Clasp one hand on top of your other hand. 15. Gently reach out so that you feel your shoulder blades stretching away from each other. 16. Gently bend your head forward. 17. Hold for 15 to 30 seconds. 18. Repeat 2 to 4 times. Midback stretch    If you have knee pain, do not do this exercise. 14. Kneel on the floor, and sit back on your ankles. 15. Lean forward, place your hands on the floor, and stretch your arms out in front of you. Rest your head between your arms. 16. Gently push your chest toward the floor, reaching as far in front of you as possible. 17. Hold for 15 to 30 seconds. 18. Repeat 2 to 4 times. Shoulder rolls    15. Sit comfortably with your feet shoulder-width apart. You can also do this exercise while standing. 16. Roll your shoulders up, then back, and then down in a smooth, circular motion. 17. Repeat 2 to 4 times. Wall push-up    14. Stand against a wall with your feet about 12 to 24 inches back from the wall. If you feel any pain when you do this exercise, stand closer to the wall. 15. Place your hands on the wall slightly wider apart than your shoulders, and lean forward. 16. Gently lean your body toward the wall.  Then push back to your starting position. Keep the motion smooth and controlled. 17. Repeat 8 to 12 times. Resisted shoulder blade squeeze    For this exercise, you will need elastic exercise material, such as surgical tubing or Thera-Band. 13. Sit or stand, holding the band in both hands in front of you. Keep your elbows close to your sides, bent at a 90-degree angle. Your palms should face up. 14. Squeeze your shoulder blades together, and move your arms to the outside, stretching the band. Be sure to keep your elbows at your sides while you do this. 15. Relax. 16. Repeat 8 to 12 times. Resisted rows    For this exercise, you will need elastic exercise material, such as surgical tubing or Thera-Band. 14. Put the band around a solid object, such as a bedpost, at about waist level. Hold one end of the band in each hand. 15. With your elbows at your sides and bent to 90 degrees, pull the band back to move your shoulder blades toward each other. Return to the starting position. 16. Repeat 8 to 12 times. Follow-up care is a key part of your treatment and safety. Be sure to make and go to all appointments, and call your doctor if you are having problems. It's also a good idea to know your test results and keep a list of the medicines you take. Where can you learn more? Go to http://luigi-atif.info/. Enter A827 in the search box to learn more about \"Healthy Upper Back: Exercises. \"  Current as of: March 21, 2017  Content Version: 11.4  © 4026-6084 [employer]. Care instructions adapted under license by [employer] (which disclaims liability or warranty for this information). If you have questions about a medical condition or this instruction, always ask your healthcare professional. [address] any warranty or liability for your use of this information.        Neck: Exercises  Your Care Instructions  Here are some examples of typical rehabilitation exercises for your condition. Start each exercise slowly. Ease off the exercise if you start to have pain. Your doctor or physical therapist will tell you when you can start these exercises and which ones will work best for you. How to do the exercises  Neck stretch    19. This stretch works best if you keep your shoulder down as you lean away from it. To help you remember to do this, start by relaxing your shoulders and lightly holding on to your thighs or your chair. 20. Tilt your head toward your shoulder and hold for 15 to 30 seconds. Let the weight of your head stretch your muscles. 21. If you would like a little added stretch, use your hand to gently and steadily pull your head toward your shoulder. For example, keeping your right shoulder down, lean your head to the left. 22. Repeat 2 to 4 times toward each shoulder. Diagonal neck stretch    19. Turn your head slightly toward the direction you will be stretching, and tilt your head diagonally toward your chest and hold for 15 to 30 seconds. 20. If you would like a little added stretch, use your hand to gently and steadily pull your head forward on the diagonal.  21. Repeat 2 to 4 times toward each side. Dorsal glide stretch    The dorsal glide stretches the back of the neck. If you feel pain, do not glide so far back. Some people find this exercise easier to do while lying on their backs with an ice pack on the neck. 18. Sit or stand tall and look straight ahead. 19. Slowly tuck your chin as you glide your head backward over your body  20. Hold for a count of 6, and then relax for up to 10 seconds. 21. Repeat 8 to 12 times. Chest and shoulder stretch    18. Sit or stand tall and glide your head backward as in the dorsal glide stretch. 19. Raise both arms so that your hands are next to your ears. 20. Take a deep breath, and as you breathe out, lower your elbows down and behind your back.  You will feel your shoulder blades slide down and together, and at the same time you will feel a stretch across your chest and the front of your shoulders. 21. Hold for about 6 seconds, and then relax for up to 10 seconds. 22. Repeat 8 to 12 times. Strengthening: Hands on head    17. Move your head backward, forward, and side to side against gentle pressure from your hands, holding each position for about 6 seconds. 18. Repeat 8 to 12 times. Follow-up care is a key part of your treatment and safety. Be sure to make and go to all appointments, and call your doctor if you are having problems. It's also a good idea to know your test results and keep a list of the medicines you take. Where can you learn more? Go to http://luigi-atif.info/. Enter P975 in the search box to learn more about \"Neck: Exercises. \"  Current as of: March 21, 2017  Content Version: 11.4  © 2693-2658 [employer]. Care instructions adapted under license by [employer] (which disclaims liability or warranty for this information). If you have questions about a medical condition or this instruction, always ask your healthcare professional. [address] any warranty or liability for your use of this information. Managing Your Allergies: Care Instructions  Your Care Instructions    Managing your allergies is an important part of staying healthy. Your doctor will help you find out what may be causing the allergies. Common causes of allergy symptoms are house dust and dust mites, animal dander, mold, and pollen. As soon as you know what triggers your symptoms, try to reduce your exposure to your triggers. This can help prevent allergy symptoms, asthma, and other health problems. Ask your doctor about allergy medicine or immunotherapy. These treatments may help reduce or prevent allergy symptoms. Follow-up care is a key part of your treatment and safety.  Be sure to make and go to all appointments, and call your doctor if you are having problems. It's also a good idea to know your test results and keep a list of the medicines you take. How can you care for yourself at home? · If you think that dust or dust mites are causing your allergies:  ¨ Wash sheets, pillowcases, and other bedding every week in hot water. ¨ Use airtight, dust-proof covers for pillows, duvets, and mattresses. Avoid plastic covers, because they tend to tear quickly and do not \"breathe. \" Wash according to the instructions. ¨ Remove extra blankets and pillows that you don't need. ¨ Use blankets that are machine-washable. ¨ Don't use home humidifiers. They can help mites live longer. · Use air-conditioning. Change or clean all filters every month. Keep windows closed. Use high-efficiency air filters. Don't use window or attic fans, which draw dust into the air. · If you're allergic to pet dander, keep pets outside or, at the very least, out of your bedroom. Old carpet and cloth-covered furniture can hold a lot of animal dander. You may need to replace them. · Look for signs of cockroaches. Use cockroach baits to get rid of them. Then clean your home well. · If you're allergic to mold, don't keep indoor plants, because molds can grow in soil. Get rid of furniture, rugs, and drapes that smell musty. Check for mold in the bathroom. · If you're allergic to pollen, stay inside when pollen counts are high. · Don't smoke or let anyone else smoke in your house. Don't use fireplaces or wood-burning stoves. Avoid paint fumes, perfumes, and other strong odors. When should you call for help? Give an epinephrine shot if:  ? · You think you are having a severe allergic reaction. ? After giving an epinephrine shot call 911, even if you feel better. ?Call 911 if:  ? · You have symptoms of a severe allergic reaction. These may include:  ¨ Sudden raised, red areas (hives) all over your body. ¨ Swelling of the throat, mouth, lips, or tongue. ¨ Trouble breathing.   ¨ Passing out (losing consciousness). Or you may feel very lightheaded or suddenly feel weak, confused, or restless. ? · You have been given an epinephrine shot, even if you feel better. ?Call your doctor now or seek immediate medical care if:  ? · You have symptoms of an allergic reaction, such as:  ¨ A rash or hives (raised, red areas on the skin). ¨ Itching. ¨ Swelling. ¨ Belly pain, nausea, or vomiting. ? Watch closely for changes in your health, and be sure to contact your doctor if:  ? · Your allergies get worse. ? · You need help controlling your allergies. ? · You have questions about allergy testing. ? · You do not get better as expected. Where can you learn more? Go to http://luigi-atif.info/. Enter L249 in the search box to learn more about \"Managing Your Allergies: Care Instructions. \"  Current as of: September 29, 2016  Content Version: 11.4  © 0989-1120 [employer]. Care instructions adapted under license by [employer] (which disclaims liability or warranty for this information). If you have questions about a medical condition or this instruction, always ask your healthcare professional. [place] [address] any warranty or liability for your use of this information.

## 2018-04-05 NOTE — MR AVS SNAPSHOT
[facility] 
 
 
 [address] 
[address] [place] [address] 
[phone] Patient: Marilee Moody MRN:  [id number] Visit Information Date & Time Provider Department Dept. Phone Encounter #  
 2018 10:30 AM Abram Purdy DO [facility] [phone] [id number] Follow-up Instructions Return in about 3 months (around 2018) for swelling, neck pain, arm pain . Your Appointments 2018 10:20 AM  
Follow Up with Justine Pradhan MD  
[facility] at [address]) Appt Note: 6 month f/u memory loss NN 18 [address] [place] [address] [address] [facility] [address]  
[phone]  
  
   
 [address] [place] [id number]  
  
    
 2018 10:30 AM  
ESTABLISHED PATIENT with Shana Cook MD  
[facility]) Appt Note: 6 MO. F/U  
 [address] [place] [address]  
[phone] [address] [facility] [address] Upcoming Health Maintenance Date Due  
 MEDICARE YEARLY EXAM 2018 GLAUCOMA SCREENING Q2Y 2020 DTaP/Tdap/Td series (2 - Td) 2026 Allergies as of 2018  Review Complete On: 2018 By: Abram Purdy DO Severity Noted Reaction Type Reactions Aristocort [Triamcinolone] High 1970    Hives Aristocort shot Codeine High 08/10/2009    Itching Lasix [Furosemide] High 08/10/2009    Itching Bupropion  2011    Other (comments) Severe headache Darvocet A500 [Propoxyphene N-acetaminophen]  2010    Other (comments)  
 headache Influenza Virus Vaccine Whole  2009    Other (comments)  
 pharyngitis Mevacor [Lovastatin]  08/10/2009    Other (comments)  
 myalgias Percocet [Oxycodone-acetaminophen]  07/15/2011    Itching Worse headache Rhinocort  08/10/2009    Other (comments)  
 nosebleed Simvastatin  08/10/2009    Other (comments)  
 myalgias Current Immunizations  Reviewed on 4/5/2018 Name Date Influenza Vaccine Split  Deferred (Contraindication) TB Skin Test (PPD) Intradermal 8/5/2013 TD Vaccine 3/28/2008 ZZZ-RETIRED (DO NOT USE) Pneumococcal Vaccine (Unspecified Type)  Deferred (Patient Refused) Reviewed by Nnamdi Green DO on 4/5/2018 at 11:47 AM  
You Were Diagnosed With   
  
 Codes Comments Essential hypertension    -  Primary ICD-10-CM: I10 
ICD-9-CM: 401.9 Seasonal allergic rhinitis due to other allergic trigger     ICD-10-CM: J30.89 ICD-9-CM: 477.8 Hypokalemia     ICD-10-CM: E87.6 ICD-9-CM: 276.8 due to vomiting vs other Memory loss     ICD-10-CM: R41.3 ICD-9-CM: 780.93 Elevated AST (SGOT)     ICD-10-CM: R74.0 ICD-9-CM: 790.4 Senile dementia of Alzheimer's type     ICD-10-CM: G30.1, F02.80 ICD-9-CM: 331.0, 294.10 improving and stable since 2013 Coronary artery disease involving native coronary artery of native heart without angina pectoris     ICD-10-CM: I25.10 ICD-9-CM: 414.01 Neck pain on right side     ICD-10-CM: M54.2 ICD-9-CM: 723.1 with radiation into R hand, intermittently Upper extremity pain, inferior, unspecified laterality     ICD-10-CM: M79.603 ICD-9-CM: 729.5 BL due to neck vs old Rotator Cuff injuries vs other Lower extremity venous stasis     ICD-10-CM: I87.8 ICD-9-CM: 459.81 Arm swelling     ICD-10-CM: M79.89 ICD-9-CM: 729.81 Bilaterally? Acanthosis nigricans     ICD-10-CM: L83 
ICD-9-CM: 582. 2 Wheelchair dependent     ICD-10-CM: Z99.3 ICD-9-CM: V46.3 Statin intolerance     ICD-10-CM: Z78.9 ICD-9-CM: 995.27 Hyperglycemia     ICD-10-CM: R73.9 ICD-9-CM: 790.29 Gait instability     ICD-10-CM: R26.81 
ICD-9-CM: 781.2  Spinal stenosis of lumbar region without neurogenic claudication     ICD-10-CM: M48.061 
 ICD-9-CM: 724.02 Weakness of both lower extremities     ICD-10-CM: R29.898 ICD-9-CM: 729.89 due to lumbar stenosis vs deconditioning vs BL knee OA pain vs other History of rotator cuff syndrome     ICD-10-CM: Z87.39 
ICD-9-CM: V13.59 Bilaterally Epigastric abdominal pain     ICD-10-CM: R10.13 ICD-9-CM: 789.06 due to dyspepsia, resolved after Pepcid Primary osteoarthritis of both knees     ICD-10-CM: M17.0 ICD-9-CM: 715.16 severe x years, non operable per ortho Depression with anxiety     ICD-10-CM: F41.8 ICD-9-CM: 300.4 Low hemoglobin     ICD-10-CM: D64.9 ICD-9-CM: 285.9 Flatulence     ICD-10-CM: R14.3 ICD-9-CM: 418. 3 Vitals BP Pulse Temp Resp Height(growth percentile) SpO2  
 128/73 (BP 1 Location: Left arm, BP Patient Position: Sitting) 72 98 °F (36.7 °C) (Oral) 16 5' 1\" (1.549 m) 95% OB Status Smoking Status Postmenopausal Former Smoker Vitals History Preferred Pharmacy Pharmacy Name Phone [address] [facility], [place] [address] [phone] Your Updated Medication List  
  
   
This list is accurate as of 4/5/18 12:02 PM.  Always use your most recent med list.  
  
  
  
  
 albuterol 90 mcg/actuation inhaler Commonly known as:  PROVENTIL HFA, VENTOLIN HFA, PROAIR HFA Take 2 Puffs by inhalation every six (6) hours as needed for Wheezing or Shortness of Breath. Indications: BRONCHOSPASM PREVENTION  
  
 amLODIPine 2.5 mg tablet Commonly known as:  Bartlett Bars Take 1 tablet by mouth daily. aspirin 81 mg chewable tablet Take 1 Tab by mouth daily. Indications: prevention of cerebrovascular accident  
  
 bumetanide 1 mg tablet Commonly known as:  Sofia Carry TAKE 1 TABLET BY MOUTH EVERY DAY  
  
 FIORICET -40 mg per capsule Generic drug:  butalbital-acetaminophen-caff Take  by mouth every four (4) hours as needed for Pain. fluticasone 50 mcg/actuation nasal spray Commonly known as:  Lupe Para 2 Sprays by Both Nostrils route daily. Indications: ALLERGIC RHINITIS  
  
 lisinopril 5 mg tablet Commonly known as:  Saravanan Bernal Take 1 tablet by mouth daily. loratadine 10 mg tablet Commonly known as:  Ayaz [place] Take 1 Tab by mouth daily. montelukast 10 mg tablet Commonly known as:  THAIR  
  
 OTHER Please dispense an Ultra light Wheelchair DX.severe osteoarthritis, fibromyalgia, Bell's Palsy, DJD,muscle weakness, lumbar stenosis, knee and shoulder pain We Performed the Following REFERRAL TO LYMPHEDEMA CLINIC [YAR057 Custom] REFERRAL TO PHYSICAL THERAPY [ELQ39 Custom] Comments:  
 HOME PT AT THE [place] 
PT IS NOT MOBILE Follow-up Instructions Return in about 3 months (around 7/5/2018) for swelling, neck pain, arm pain . Referral Information Referral ID Referred By Referred To  
  
 [id number] Gregoria Marking R Not Available Visits Status Start Date End Date 1 New Request 4/5/18 4/5/19 If your referral has a status of pending review or denied, additional information will be sent to support the outcome of this decision. Referral ID Referred By Referred To  
 [id number] Gregoria Marking R Not Available Visits Status Start Date End Date 1 New Request 4/5/18 4/5/19 If your referral has a status of pending review or denied, additional information will be sent to support the outcome of this decision. Patient Instructions Knee Arthritis: Exercises Your Care Instructions Here are some examples of exercises for knee arthritis. Start each exercise slowly. Ease off the exercise if you start to have pain. Your doctor or physical therapist will tell you when you can start these exercises and which ones will work best for you. How to do the exercises Knee flexion with heel slide 1. Lie on your back with your knees bent. 2. Slide your heel back by bending your affected knee as far as you can. Then hook your other foot around your ankle to help pull your heel even farther back. 3. Hold for about 6 seconds, then rest for up to 10 seconds. 4. Repeat 8 to 12 times. 5. Switch legs and repeat steps 1 through 4, even if only one knee is sore. [facility] 1. Sit with your affected leg straight and supported on the floor or a firm bed. Place a small, rolled-up towel under your knee. Your other leg should be bent, with that foot flat on the floor. 2. Tighten the thigh muscles of your affected leg by pressing the back of your knee down into the towel. 3. Hold for about 6 seconds, then rest for up to 10 seconds. 4. Repeat 8 to 12 times. 5. Switch legs and repeat steps 1 through 4, even if only one knee is sore. Straight-leg raises to the front 1. Lie on your back with your good knee bent so that your foot rests flat on the floor. Your affected leg should be straight. Make sure that your low back has a normal curve. You should be able to slip your hand in between the floor and the small of your back, with your palm touching the floor and your back touching the back of your hand. 2. Tighten the thigh muscles in your affected leg by pressing the back of your knee flat down to the floor. Hold your knee straight. 3. Keeping the thigh muscles tight and your leg straight, lift your affected leg up so that your heel is about 12 inches off the floor. Hold for about 6 seconds, then lower slowly. 4. Relax for up to 10 seconds between repetitions. 5. Repeat 8 to 12 times. 6. Switch legs and repeat steps 1 through 5, even if only one knee is sore. Active knee flexion 1. Lie on your stomach with your knees straight. If your kneecap is uncomfortable, roll up a washcloth and put it under your leg just above your kneecap.  
2. Lift the foot of your affected leg by bending the knee so that you bring the foot up toward your buttock. If this motion hurts, try it without bending your knee quite as far. This may help you avoid any painful motion. 3. Slowly move your leg up and down. 4. Repeat 8 to 12 times. 5. Switch legs and repeat steps 1 through 4, even if only one knee is sore. Quadriceps stretch (facedown) 1. Lie flat on your stomach, and rest your face on the floor. 2. Wrap a towel or belt strap around the lower part of your affected leg. Then use the towel or belt strap to slowly pull your heel toward your buttock until you feel a stretch. 3. Hold for about 15 to 30 seconds, then relax your leg against the towel or belt strap. 4. Repeat 2 to 4 times. 5. Switch legs and repeat steps 1 through 4, even if only one knee is sore. Stationary exercise bike 1. If you do not have a stationary exercise bike at home, you can find one to ride at your local health club or community center. 2. Adjust the height of the bike seat so that your knee is slightly bent when your leg is extended downward. If your knee hurts when the pedal reaches the top, you can raise the seat so that your knee does not bend as much. 3. Start slowly. At first, try to do 5 to 10 minutes of cycling with little to no resistance. Then increase your time and the resistance bit by bit until you can do 20 to 30 minutes without pain. 4. If you start to have pain, rest your knee until your pain gets back to the level that is normal for you. Or cycle for less time or with less effort. Follow-up care is a key part of your treatment and safety. Be sure to make and go to all appointments, and call your doctor if you are having problems. It's also a good idea to know your test results and keep a list of the medicines you take. Where can you learn more? Go to http://luigi-atif.info/. Enter C159 in the search box to learn more about \"Knee Arthritis: Exercises. \" Current as of: March 21, 2017 Content Version: 11.4 © 4351-3081 [employer]. Care instructions adapted under license by [employer] (which disclaims liability or warranty for this information). If you have questions about a medical condition or this instruction, always ask your healthcare professional. [address] any warranty or liability for your use of this information. Shoulder Arthritis: Exercises Your Care Instructions Here are some examples of typical rehabilitation exercises for your condition. Start each exercise slowly. Ease off the exercise if you start to have pain. Your doctor or physical therapist will tell you when you can start these exercises and which ones will work best for you. How to do the exercises Shoulder flexion (lying down) To make a wand for this exercise, use a piece of PVC pipe or a broom handle with the broom removed. Make the wand about a foot wider than your shoulders. 6. Lie on your back, holding a wand with both hands. Your palms should face down as you hold the wand. 7. Keeping your elbows straight, slowly raise your arms over your head. Raise them until you feel a stretch in your shoulders, upper back, and chest. 
8. Hold for 15 to 30 seconds. 9. Repeat 2 to 4 times. Shoulder rotation (lying down) To make a wand for this exercise, use a piece of PVC pipe or a broom handle with the broom removed. Make the wand about a foot wider than your shoulders. 6. Lie on your back. Hold a wand with both hands with your elbows bent and palms up. 7. Keep your elbows close to your body, and move the wand across your body toward the sore arm. 8. Hold for 8 to 12 seconds. 9. Repeat 2 to 4 times. Shoulder internal rotation with towel 7. Hold a towel above and behind your head with the arm that is not sore. 8. With your sore arm, reach behind your back and grasp the towel. 9. With the arm above your head, pull the towel upward.  Do this until you feel a stretch on the front and outside of your sore shoulder. 10. Hold 15 to 30 seconds. 11. Repeat 2 to 4 times. Shoulder blade squeeze 6. Stand with your arms at your sides, and squeeze your shoulder blades together. Do not raise your shoulders up as you squeeze. 7. Hold 6 seconds. 8. Repeat 8 to 12 times. Resisted rows For this exercise, you will need elastic exercise material, such as surgical tubing or Thera-Band. 6. Put the band around a solid object at about waist level. (A bedpost will work well.) Each hand should hold an end of the band. 7. With your elbows at your sides and bent to 90 degrees, pull the band back. Your shoulder blades should move toward each other. Return to the starting position. 8. Repeat 8 to 12 times. External rotator strengthening exercise 5. Start by tying a piece of elastic exercise material to a doorknob. You can use surgical tubing or Thera-Band. (You may also hold one end of the band in each hand.) 6. Stand or sit with your shoulder relaxed and your elbow bent 90 degrees. Your upper arm should rest comfortably against your side. Squeeze a rolled towel between your elbow and your body for comfort. This will help keep your arm at your side. 7. Hold one end of the elastic band with the hand of the painful arm. 8. Start with your forearm across your belly. Slowly rotate the forearm out away from your body. Keep your elbow and upper arm tucked against the towel roll or the side of your body until you begin to feel tightness in your shoulder. Slowly move your arm back to where you started. 9. Repeat 8 to 12 times. Internal rotator strengthening exercise 1. Start by tying a piece of elastic exercise material to a doorknob. You can use surgical tubing or Thera-Band. 2. Stand or sit with your shoulder relaxed and your elbow bent 90 degrees. Your upper arm should rest comfortably against your side.  Squeeze a rolled towel between your elbow and your body for comfort. This will help keep your arm at your side. 3. Hold one end of the elastic band in the hand of the painful arm. 4. Slowly rotate your forearm toward your body until it touches your belly. Slowly move it back to where you started. 5. Keep your elbow and upper arm firmly tucked against the towel roll or at your side. 6. Repeat 8 to 12 times. Pendulum swing If you have pain in your back, do not do this exercise. 1. Hold on to a table or the back of a chair with your good arm. Then bend forward a little and let your sore arm hang straight down. This exercise does not use the arm muscles. Rather, use your legs and your hips to create movement that makes your arm swing freely. 2. Use the movement from your hips and legs to guide the slightly swinging arm back and forth like a pendulum (or elephant trunk). Then guide it in circles that start small (about the size of a dinner plate). Make the circles a bit larger each day, as your pain allows. 3. Do this exercise for 5 minutes, 5 to 7 times each day. 4. As you have less pain, try bending over a little farther to do this exercise. This will increase the amount of movement at your shoulder. Follow-up care is a key part of your treatment and safety. Be sure to make and go to all appointments, and call your doctor if you are having problems. It's also a good idea to know your test results and keep a list of the medicines you take. Where can you learn more? Go to http://luigi-atif.info/. Enter H562 in the search box to learn more about \"Shoulder Arthritis: Exercises. \" Current as of: March 21, 2017 Content Version: 11.4 © 5394-9634 [employer]. Care instructions adapted under license by [employer] (which disclaims liability or warranty for this information).  If you have questions about a medical condition or this instruction, always ask your healthcare professional. [address] any warranty or liability for your use of this information. Rotator Cuff: Exercises Your Care Instructions Here are some examples of typical rehabilitation exercises for your condition. Start each exercise slowly. Ease off the exercise if you start to have pain. Your doctor or physical therapist will tell you when you can start these exercises and which ones will work best for you. How to do the exercises Pendulum swing If you have pain in your back, do not do this exercise. 10. Hold on to a table or the back of a chair with your good arm. Then bend forward a little and let your sore arm hang straight down. This exercise does not use the arm muscles. Rather, use your legs and your hips to create movement that makes your arm swing freely. 11. Use the movement from your hips and legs to guide the slightly swinging arm back and forth like a pendulum (or elephant trunk). Then guide it in circles that start small (about the size of a dinner plate). Make the circles a bit larger each day, as your pain allows. 12. Do this exercise for 5 minutes, 5 to 7 times each day. 13. As you have less pain, try bending over a little farther to do this exercise. This will increase the amount of movement at your shoulder. Posterior stretching exercise 10. Hold the elbow of your injured arm with your other hand. 11. Use your hand to pull your injured arm gently up and across your body. You will feel a gentle stretch across the back of your injured shoulder. 12. Hold for at least 15 to 30 seconds. Then slowly lower your arm. 13. Repeat 2 to 4 times. Up-the-back stretch Your doctor or physical therapist may want you to wait to do this stretch until you have regained most of your range of motion and strength. You can do this stretch in different ways.  Hold any of these stretches for at least 15 to 30 seconds. Repeat them 2 to 4 times. 12. Put your hand in your back pocket. Let it rest there to stretch your shoulder. 13. With your other hand, hold your injured arm (palm outward) behind your back by the wrist. Pull your arm up gently to stretch your shoulder. 14. Next, put a towel over your other shoulder. Put the hand of your injured arm behind your back. Now hold the back end of the towel. With the other hand, hold the front end of the towel in front of your body. Pull gently on the front end of the towel. This will bring your hand farther up your back to stretch your shoulder. Overhead stretch 9. Standing about an arm's length away, grasp onto a solid surface. You could use a countertop, a doorknob, or the back of a sturdy chair. 10. With your knees slightly bent, bend forward with your arms straight. Lower your upper body, and let your shoulders stretch. 11. As your shoulders are able to stretch farther, you may need to take a step or two backward. 12. Hold for at least 15 to 30 seconds. Then stand up and relax. If you had stepped back during your stretch, step forward so you can keep your hands on the solid surface. 13. Repeat 2 to 4 times. Shoulder flexion (lying down) To make a wand for this exercise, use a piece of PVC pipe or a broom handle with the broom removed. Make the wand about a foot wider than your shoulders. 9. Lie on your back, holding a wand with both hands. Your palms should face down as you hold the wand. 10. Keeping your elbows straight, slowly raise your arms over your head. Raise them until you feel a stretch in your shoulders, upper back, and chest. 
11. Hold for 15 to 30 seconds. 12. Repeat 2 to 4 times. Shoulder rotation (lying down) To make a wand for this exercise, use a piece of PVC pipe or a broom handle with the broom removed. Make the wand about a foot wider than your shoulders. 10. Lie on your back. Hold a wand with both hands with your elbows bent and palms up. 11. Keep your elbows close to your body, and move the wand across your body toward the sore arm. 12. Hold for 8 to 12 seconds. 13. Repeat 2 to 4 times. Wall climbing (to the side) Avoid any movement that is straight to your side, and be careful not to arch your back. Your arm should stay about 30 degrees to the front of your side. 7. Stand with your side to a wall so that your fingers can just touch it at an angle about 30 degrees toward the front of your body. 8. Walk the fingers of your injured arm up the wall as high as pain permits. Try not to shrug your shoulder up toward your ear as you move your arm up. 9. Hold that position for a count of at least 15 to 20. 
10. Walk your fingers back down to the starting position. 11. Repeat at least 2 to 4 times. Try to reach higher each time. Wall climbing (to the front) During this stretching exercise, be careful not to arch your back. 5. Face a wall, and stand so your fingers can just touch it. 6. Keeping your shoulder down, walk the fingers of your injured arm up the wall as high as pain permits. (Don't shrug your shoulder up toward your ear.) 7. Hold your arm in that position for at least 15 to 30 seconds. 8. Slowly walk your fingers back down to where you started. 9. Repeat at least 2 to 4 times. Try to reach higher each time. Shoulder blade squeeze 1. Stand with your arms at your sides, and squeeze your shoulder blades together. Do not raise your shoulders up as you squeeze. 2. Hold 6 seconds. 3. Repeat 8 to 12 times. Scapular exercise: Arm reach 1. Lie flat on your back. This exercise is a very slight motion that starts with your arms raised (elbows straight, arms straight). 2. From this position, reach higher toward the ellen or ceiling. Keep your elbows straight. All motion should be from your shoulder blade only. 3. Relax your arms back to where you started. 4. Repeat 8 to 12 times. Arm raise to the side During this strengthening exercise, your arm should stay about 30 degrees to the front of your side. 1. Slowly raise your injured arm to the side, with your thumb facing up. Raise your arm 60 degrees at the most (shoulder level is 90 degrees). 2. Hold the position for 3 to 5 seconds. Then lower your arm back to your side. If you need to, bring your \"good\" arm across your body and place it under the elbow as you lower your injured arm. Use your good arm to keep your injured arm from dropping down too fast. 
3. Repeat 8 to 12 times. 4. When you first start out, don't hold any extra weight in your hand. As you get stronger, you may use a 1-pound to 2-pound dumbbell or a small can of food. Shoulder flexor and extensor exercise These are isometric exercises. That means you contract your muscles without actually moving. 1. Push forward (flex): Stand facing a wall or doorjamb, about 6 inches or less back. Hold your injured arm against your body. Make a closed fist with your thumb on top. Then gently push your hand forward into the wall with about 25% to 50% of your strength. Don't let your body move backward as you push. Hold for about 6 seconds. Relax for a few seconds. Repeat 8 to 12 times. 2. Push backward (extend): Stand with your back flat against a wall. Your upper arm should be against the wall, with your elbow bent 90 degrees (your hand straight ahead). Push your elbow gently back against the wall with about 25% to 50% of your strength. Don't let your body move forward as you push. Hold for about 6 seconds. Relax for a few seconds. Repeat 8 to 12 times. Scapular exercise: Wall push-ups This exercise is best done with your fingers somewhat turned out, rather than straight up and down. 1. Stand facing a wall, about 12 inches to 18 [address] 2. Place your hands on the wall at shoulder height. 3. Slowly bend your elbows and bring your face to the wall. Keep your back and hips straight. 4. Push back to where you started. 5. Repeat 8 to 12 times. 6. When you can do this exercise against a wall comfortably, you can try it against a counter. You can then slowly progress to the end of a couch, then to a sturdy chair, and finally to the floor. Scapular exercise: Retraction For this exercise, you will need elastic exercise material, such as surgical tubing or Thera-Band. 1. Put the band around a solid object at about waist level. (A bedpost will work well.) Each hand should hold an end of the band. 2. With your elbows at your sides and bent to 90 degrees, pull the band back. Your shoulder blades should move toward each other. Then move your arms back where you started. 3. Repeat 8 to 12 times. 4. If you have good range of motion in your shoulders, try this exercise with your arms lifted out to the sides. Keep your elbows at a 90-degree angle. Raise the elastic band up to about shoulder level. Pull the band back to move your shoulder blades toward each other. Then move your arms back where you started. Internal rotator strengthening exercise 1. Start by tying a piece of elastic exercise material to a doorknob. You can use surgical tubing or Thera-Band. 2. Stand or sit with your shoulder relaxed and your elbow bent 90 degrees. Your upper arm should rest comfortably against your side. Squeeze a rolled towel between your elbow and your body for comfort. This will help keep your arm at your side. 3. Hold one end of the elastic band in the hand of the painful arm. 4. Slowly rotate your forearm toward your body until it touches your belly. Slowly move it back to where you started. 5. Keep your elbow and upper arm firmly tucked against the towel roll or at your side. 6. Repeat 8 to 12 times. External rotator strengthening exercise 1. Start by tying a piece of elastic exercise material to a doorknob. You can use surgical tubing or Thera-Band. (You may also hold one end of the band in each hand.) 2. Stand or sit with your shoulder relaxed and your elbow bent 90 degrees. Your upper arm should rest comfortably against your side. Squeeze a rolled towel between your elbow and your body for comfort. This will help keep your arm at your side. 3. Hold one end of the elastic band with the hand of the painful arm. 4. Start with your forearm across your belly. Slowly rotate the forearm out away from your body. Keep your elbow and upper arm tucked against the towel roll or the side of your body until you begin to feel tightness in your shoulder. Slowly move your arm back to where you started. 5. Repeat 8 to 12 times. Follow-up care is a key part of your treatment and safety. Be sure to make and go to all appointments, and call your doctor if you are having problems. It's also a good idea to know your test results and keep a list of the medicines you take. Where can you learn more? Go to http://luigi-atif.info/. Enter Delmi Malone in the search box to learn more about \"Rotator Cuff: Exercises. \" Current as of: March 21, 2017 Content Version: 11.4 © 1953-7272 Healthwise[employer]. Care instructions adapted under license by [employer] (which disclaims liability or warranty for this information). If you have questions about a medical condition or this instruction, always ask your healthcare professional. [place] [address] any warranty or liability for your use of this information. Healthy Upper Back: Exercises Your Care Instructions Here are some examples of exercises for your upper back. Start each exercise slowly. Ease off the exercise if you start to have pain. Your doctor or physical therapist will tell you when you can start these exercises and which ones will work best for you. How to do the exercises Lower neck and upper back stretch 14. Stretch your arms out in front of your body. Clasp one hand on top of your other hand. 15. Gently reach out so that you feel your shoulder blades stretching away from each other. 16. Gently bend your head forward. 17. Hold for 15 to 30 seconds. 18. Repeat 2 to 4 times. Midback stretch If you have knee pain, do not do this exercise. 14. Kneel on the floor, and sit back on your ankles. 15. Lean forward, place your hands on the floor, and stretch your arms out in front of you. Rest your head between your arms. 16. Gently push your chest toward the floor, reaching as far in front of you as possible. 17. Hold for 15 to 30 seconds. 18. Repeat 2 to 4 times. Shoulder rolls 15. Sit comfortably with your feet shoulder-width apart. You can also do this exercise while standing. 16. Roll your shoulders up, then back, and then down in a smooth, circular motion. 17. Repeat 2 to 4 times. Wall push-up 14. Stand against a wall with your feet about 12 to 24 inches back from the wall. If you feel any pain when you do this exercise, stand closer to the wall. 15. Place your hands on the wall slightly wider apart than your shoulders, and lean forward. 16. Gently lean your body toward the wall. Then push back to your starting position. Keep the motion smooth and controlled. 17. Repeat 8 to 12 times. Resisted shoulder blade squeeze For this exercise, you will need elastic exercise material, such as surgical tubing or Thera-Band. 13. Sit or stand, holding the band in both hands in front of you. Keep your elbows close to your sides, bent at a 90-degree angle. Your palms should face up. 14. Squeeze your shoulder blades together, and move your arms to the outside, stretching the band. Be sure to keep your elbows at your sides while you do this. 15. Relax. 16. Repeat 8 to 12 times. Resisted rows For this exercise, you will need elastic exercise material, such as surgical tubing or Thera-Band. 14. Put the band around a solid object, such as a bedpost, at about waist level. Hold one end of the band in each hand. 15. With your elbows at your sides and bent to 90 degrees, pull the band back to move your shoulder blades toward each other. Return to the starting position. 16. Repeat 8 to 12 times. Follow-up care is a key part of your treatment and safety. Be sure to make and go to all appointments, and call your doctor if you are having problems. It's also a good idea to know your test results and keep a list of the medicines you take. Where can you learn more? Go to http://luigi-atif.info/. Enter Z354 in the search box to learn more about \"Healthy Upper Back: Exercises. \" Current as of: March 21, 2017 Content Version: 11.4 © 8164-4124 [employer]. Care instructions adapted under license by [email or web address] (which disclaims liability or warranty for this information). If you have questions about a medical condition or this instruction, always ask your healthcare professional. [address] any warranty or liability for your use of this information. Neck: Exercises Your Care Instructions Here are some examples of typical rehabilitation exercises for your condition. Start each exercise slowly. Ease off the exercise if you start to have pain. Your doctor or physical therapist will tell you when you can start these exercises and which ones will work best for you. How to do the exercises Neck stretch 19. This stretch works best if you keep your shoulder down as you lean away from it. To help you remember to do this, start by relaxing your shoulders and lightly holding on to your thighs or your chair. 20. Tilt your head toward your shoulder and hold for 15 to 30 seconds. Let the weight of your head stretch your muscles. 21. If you would like a little added stretch, use your hand to gently and steadily pull your head toward your shoulder. For example, keeping your right shoulder down, lean your head to the left. 22. Repeat 2 to 4 times toward each shoulder. Diagonal neck stretch 19. Turn your head slightly toward the direction you will be stretching, and tilt your head diagonally toward your chest and hold for 15 to 30 seconds. 20. If you would like a little added stretch, use your hand to gently and steadily pull your head forward on the diagonal. 
21. Repeat 2 to 4 times toward each side. Dorsal glide stretch The dorsal glide stretches the back of the neck. If you feel pain, do not glide so far back. Some people find this exercise easier to do while lying on their backs with an ice pack on the neck. 18. Sit or stand tall and look straight ahead. 19. Slowly tuck your chin as you glide your head backward over your body 20. Hold for a count of 6, and then relax for up to 10 seconds. 21. Repeat 8 to 12 times. Chest and shoulder stretch 18. Sit or stand tall and glide your head backward as in the dorsal glide stretch. 19. Raise both arms so that your hands are next to your ears. 20. Take a deep breath, and as you breathe out, lower your elbows down and behind your back. You will feel your shoulder blades slide down and together, and at the same time you will feel a stretch across your chest and the front of your shoulders. 21. Hold for about 6 seconds, and then relax for up to 10 seconds. 22. Repeat 8 to 12 times. Strengthening: Hands on head 17. Move your head backward, forward, and side to side against gentle pressure from your hands, holding each position for about 6 seconds. 18. Repeat 8 to 12 times. Follow-up care is a key part of your treatment and safety.  Be sure to make and go to all appointments, and call your doctor if you are having problems. It's also a good idea to know your test results and keep a list of the medicines you take. Where can you learn more? Go to http://luigi-atif.info/. Enter P975 in the search box to learn more about \"Neck: Exercises. \" Current as of: March 21, 2017 Content Version: 11.4 © 1944-6035 [employer]. Care instructions adapted under license by [employer] (which disclaims liability or warranty for this information). If you have questions about a medical condition or this instruction, always ask your healthcare professional. [address] any warranty or liability for your use of this information. Managing Your Allergies: Care Instructions Your Care Instructions Managing your allergies is an important part of staying healthy. Your doctor will help you find out what may be causing the allergies. Common causes of allergy symptoms are house dust and dust mites, animal dander, mold, and pollen. As soon as you know what triggers your symptoms, try to reduce your exposure to your triggers. This can help prevent allergy symptoms, asthma, and other health problems. Ask your doctor about allergy medicine or immunotherapy. These treatments may help reduce or prevent allergy symptoms. Follow-up care is a key part of your treatment and safety. Be sure to make and go to all appointments, and call your doctor if you are having problems. It's also a good idea to know your test results and keep a list of the medicines you take. How can you care for yourself at home? · If you think that dust or dust mites are causing your allergies: 
¨ Wash sheets, pillowcases, and other bedding every week in hot water. ¨ Use airtight, dust-proof covers for pillows, duvets, and mattresses. Avoid plastic covers, because they tend to tear quickly and do not \"breathe. \" Wash according to the instructions. ¨ Remove extra blankets and pillows that you don't need. ¨ Use blankets that are machine-washable. ¨ Don't use home humidifiers. They can help mites live longer. · Use air-conditioning. Change or clean all filters every month. Keep windows closed. Use high-efficiency air filters. Don't use window or attic fans, which draw dust into the air. · If you're allergic to pet dander, keep pets outside or, at the very least, out of your bedroom. Old carpet and cloth-covered furniture can hold a lot of animal dander. You may need to replace them. · Look for signs of cockroaches. Use cockroach baits to get rid of them. Then clean your home well. · If you're allergic to mold, don't keep indoor plants, because molds can grow in soil. Get rid of furniture, rugs, and drapes that smell musty. Check for mold in the bathroom. · If you're allergic to pollen, stay inside when pollen counts are high. · Don't smoke or let anyone else smoke in your house. Don't use fireplaces or wood-burning stoves. Avoid paint fumes, perfumes, and other strong odors. When should you call for help? Give an epinephrine shot if: 
? · You think you are having a severe allergic reaction. ? After giving an epinephrine shot call 911, even if you feel better. ?Call 911 if: 
? · You have symptoms of a severe allergic reaction. These may include: 
¨ Sudden raised, red areas (hives) all over your body. ¨ Swelling of the throat, mouth, lips, or tongue. ¨ Trouble breathing. ¨ Passing out (losing consciousness). Or you may feel very lightheaded or suddenly feel weak, confused, or restless. ? · You have been given an epinephrine shot, even if you feel better. ?Call your doctor now or seek immediate medical care if: 
? · You have symptoms of an allergic reaction, such as: ¨ A rash or hives (raised, red areas on the skin). ¨ Itching. ¨ Swelling. ¨ Belly pain, nausea, or vomiting. ? Watch closely for changes in your health, and be sure to contact your doctor if: 
? · Your allergies get worse. ? · You need help controlling your allergies. ? · You have questions about allergy testing. ? · You do not get better as expected. Where can you learn more? Go to http://luigi-atif.info/. Enter L249 in the search box to learn more about \"Managing Your Allergies: Care Instructions. \" Current as of: September 29, 2016 Content Version: 11.4 © 1552-5637 [employer]. Care instructions adapted under license by [employer] (which disclaims liability or warranty for this information). If you have questions about a medical condition or this instruction, always ask your healthcare professional. [place] [address] any warranty or liability for your use of this information. Introducing [facility]! [facility] introduces Tauntr patient portal. Now you can access parts of your medical record, email your doctor's office, and request medication refills online. 1. In your internet browser, go to [email or web address] 2. Click on the First Time User? Click Here link in the Sign In box. You will see the New Member Sign Up page. 3. Enter your Tauntr Access Code exactly as it appears below. You will not need to use this code after youve completed the sign-up process. If you do not sign up before the expiration date, you must request a new code. · Tauntr Access Code: [email or web address] Expires: 7/4/2018  9:54 AM 
 
4. Enter the last four digits of your Social Security Number (xxxx) and Date of Birth (mm/dd/yyyy) as indicated and click Submit. You will be taken to the next sign-up page. 5. Create a [employer]uit ID. This will be your Tauntr login ID and cannot be changed, so think of one that is secure and easy to remember. 6. Create a Tauntr password. You can change your password at any time. 7. Enter your Password Reset Question and Answer. This can be used at a later time if you forget your password. 8. Enter your e-mail address. You will receive e-mail notification when new information is available in [address] 9. Click Sign Up. You can now view and download portions of your medical record. 10. Click the Download Summary menu link to download a portable copy of your medical information. If you have questions, please visit the Frequently Asked Questions section of the beatlab website. Remember, beatlab is NOT to be used for urgent needs. For medical emergencies, dial 911. Now available from your iPhone and Android! Please provide this summary of care documentation to your next provider. Your primary care clinician is listed as Lacy Briggs. If you have any questions after today's visit, please call [phone].

## 2018-04-05 NOTE — PATIENT INSTRUCTIONS
Knee Arthritis: Exercises  Your Care Instructions  Here are some examples of exercises for knee arthritis. Start each exercise slowly. Ease off the exercise if you start to have pain. Your doctor or physical therapist will tell you when you can start these exercises and which ones will work best for you. How to do the exercises  Knee flexion with heel slide    1. Lie on your back with your knees bent. 2. Slide your heel back by bending your affected knee as far as you can. Then hook your other foot around your ankle to help pull your heel even farther back. 3. Hold for about 6 seconds, then rest for up to 10 seconds. 4. Repeat 8 to 12 times. 5. Switch legs and repeat steps 1 through 4, even if only one knee is sore. Quad sets    1. Sit with your affected leg straight and supported on the floor or a firm bed. Place a small, rolled-up towel under your knee. Your other leg should be bent, with that foot flat on the floor. 2. Tighten the thigh muscles of your affected leg by pressing the back of your knee down into the towel. 3. Hold for about 6 seconds, then rest for up to 10 seconds. 4. Repeat 8 to 12 times. 5. Switch legs and repeat steps 1 through 4, even if only one knee is sore. Straight-leg raises to the front    1. Lie on your back with your good knee bent so that your foot rests flat on the floor. Your affected leg should be straight. Make sure that your low back has a normal curve. You should be able to slip your hand in between the floor and the small of your back, with your palm touching the floor and your back touching the back of your hand. 2. Tighten the thigh muscles in your affected leg by pressing the back of your knee flat down to the floor. Hold your knee straight. 3. Keeping the thigh muscles tight and your leg straight, lift your affected leg up so that your heel is about 12 inches off the floor. Hold for about 6 seconds, then lower slowly.   4. Relax for up to 10 seconds between repetitions. 5. Repeat 8 to 12 times. 6. Switch legs and repeat steps 1 through 5, even if only one knee is sore. Active knee flexion    1. Lie on your stomach with your knees straight. If your kneecap is uncomfortable, roll up a washcloth and put it under your leg just above your kneecap. 2. Lift the foot of your affected leg by bending the knee so that you bring the foot up toward your buttock. If this motion hurts, try it without bending your knee quite as far. This may help you avoid any painful motion. 3. Slowly move your leg up and down. 4. Repeat 8 to 12 times. 5. Switch legs and repeat steps 1 through 4, even if only one knee is sore. Quadriceps stretch (facedown)    1. Lie flat on your stomach, and rest your face on the floor. 2. Wrap a towel or belt strap around the lower part of your affected leg. Then use the towel or belt strap to slowly pull your heel toward your buttock until you feel a stretch. 3. Hold for about 15 to 30 seconds, then relax your leg against the towel or belt strap. 4. Repeat 2 to 4 times. 5. Switch legs and repeat steps 1 through 4, even if only one knee is sore. Stationary exercise bike    1. If you do not have a stationary exercise bike at home, you can find one to ride at your local health club or community center. 2. Adjust the height of the bike seat so that your knee is slightly bent when your leg is extended downward. If your knee hurts when the pedal reaches the top, you can raise the seat so that your knee does not bend as much. 3. Start slowly. At first, try to do 5 to 10 minutes of cycling with little to no resistance. Then increase your time and the resistance bit by bit until you can do 20 to 30 minutes without pain. 4. If you start to have pain, rest your knee until your pain gets back to the level that is normal for you. Or cycle for less time or with less effort. Follow-up care is a key part of your treatment and safety.  Be sure to make and go to all appointments, and call your doctor if you are having problems. It's also a good idea to know your test results and keep a list of the medicines you take. Where can you learn more? Go to http://luigi-atif.info/. Enter C159 in the search box to learn more about \"Knee Arthritis: Exercises. \"  Current as of: March 21, 2017  Content Version: 11.4  © 2006-2017 [employer]. Care instructions adapted under license by [employer] (which disclaims liability or warranty for this information). If you have questions about a medical condition or this instruction, always ask your healthcare professional. [place] [address] any warranty or liability for your use of this information. Shoulder Arthritis: Exercises  Your Care Instructions  Here are some examples of typical rehabilitation exercises for your condition. Start each exercise slowly. Ease off the exercise if you start to have pain. Your doctor or physical therapist will tell you when you can start these exercises and which ones will work best for you. How to do the exercises  Shoulder flexion (lying down)    To make a wand for this exercise, use a piece of PVC pipe or a broom handle with the broom removed. Make the wand about a foot wider than your shoulders. 6. Lie on your back, holding a wand with both hands. Your palms should face down as you hold the wand. 7. Keeping your elbows straight, slowly raise your arms over your head. Raise them until you feel a stretch in your shoulders, upper back, and chest.  8. Hold for 15 to 30 seconds. 9. Repeat 2 to 4 times. Shoulder rotation (lying down)    To make a wand for this exercise, use a piece of PVC pipe or a broom handle with the broom removed. Make the wand about a foot wider than your shoulders. 6. Lie on your back. Hold a wand with both hands with your elbows bent and palms up.   7. Keep your elbows close to your body, and move the wand across your body toward the sore arm. 8. Hold for 8 to 12 seconds. 9. Repeat 2 to 4 times. Shoulder internal rotation with towel    7. Hold a towel above and behind your head with the arm that is not sore. 8. With your sore arm, reach behind your back and grasp the towel. 9. With the arm above your head, pull the towel upward. Do this until you feel a stretch on the front and outside of your sore shoulder. 10. Hold 15 to 30 seconds. 11. Repeat 2 to 4 times. Shoulder blade squeeze    6. Stand with your arms at your sides, and squeeze your shoulder blades together. Do not raise your shoulders up as you squeeze. 7. Hold 6 seconds. 8. Repeat 8 to 12 times. Resisted rows    For this exercise, you will need elastic exercise material, such as surgical tubing or Thera-Band. 6. Put the band around a solid object at about waist level. (A bedpost will work well.) Each hand should hold an end of the band. 7. With your elbows at your sides and bent to 90 degrees, pull the band back. Your shoulder blades should move toward each other. Return to the starting position. 8. Repeat 8 to 12 times. External rotator strengthening exercise    5. Start by tying a piece of elastic exercise material to a doorknob. You can use surgical tubing or Thera-Band. (You may also hold one end of the band in each hand.)  6. Stand or sit with your shoulder relaxed and your elbow bent 90 degrees. Your upper arm should rest comfortably against your side. Squeeze a rolled towel between your elbow and your body for comfort. This will help keep your arm at your side. 7. Hold one end of the elastic band with the hand of the painful arm. 8. Start with your forearm across your belly. Slowly rotate the forearm out away from your body. Keep your elbow and upper arm tucked against the towel roll or the side of your body until you begin to feel tightness in your shoulder. Slowly move your arm back to where you started. 9. Repeat 8 to 12 times.   Internal rotator strengthening exercise    1. Start by tying a piece of elastic exercise material to a doorknob. You can use surgical tubing or Thera-Band. 2. Stand or sit with your shoulder relaxed and your elbow bent 90 degrees. Your upper arm should rest comfortably against your side. Squeeze a rolled towel between your elbow and your body for comfort. This will help keep your arm at your side. 3. Hold one end of the elastic band in the hand of the painful arm. 4. Slowly rotate your forearm toward your body until it touches your belly. Slowly move it back to where you started. 5. Keep your elbow and upper arm firmly tucked against the towel roll or at your side. 6. Repeat 8 to 12 times. Pendulum swing    If you have pain in your back, do not do this exercise. 1. Hold on to a table or the back of a chair with your good arm. Then bend forward a little and let your sore arm hang straight down. This exercise does not use the arm muscles. Rather, use your legs and your hips to create movement that makes your arm swing freely. 2. Use the movement from your hips and legs to guide the slightly swinging arm back and forth like a pendulum (or elephant trunk). Then guide it in circles that start small (about the size of a dinner plate). Make the circles a bit larger each day, as your pain allows. 3. Do this exercise for 5 minutes, 5 to 7 times each day. 4. As you have less pain, try bending over a little farther to do this exercise. This will increase the amount of movement at your shoulder. Follow-up care is a key part of your treatment and safety. Be sure to make and go to all appointments, and call your doctor if you are having problems. It's also a good idea to know your test results and keep a list of the medicines you take. Where can you learn more? Go to http://luigi-atif.info/. Enter H562 in the search box to learn more about \"Shoulder Arthritis: Exercises. \"  Current as of: March 21, 2017  Content Version: 11.4  © 1850-3099 [employer]. Care instructions adapted under license by [employer] (which disclaims liability or warranty for this information). If you have questions about a medical condition or this instruction, always ask your healthcare professional. [address] any warranty or liability for your use of this information. Rotator Cuff: Exercises  Your Care Instructions  Here are some examples of typical rehabilitation exercises for your condition. Start each exercise slowly. Ease off the exercise if you start to have pain. Your doctor or physical therapist will tell you when you can start these exercises and which ones will work best for you. How to do the exercises  Pendulum swing    If you have pain in your back, do not do this exercise. 10. Hold on to a table or the back of a chair with your good arm. Then bend forward a little and let your sore arm hang straight down. This exercise does not use the arm muscles. Rather, use your legs and your hips to create movement that makes your arm swing freely. 11. Use the movement from your hips and legs to guide the slightly swinging arm back and forth like a pendulum (or elephant trunk). Then guide it in circles that start small (about the size of a dinner plate). Make the circles a bit larger each day, as your pain allows. 12. Do this exercise for 5 minutes, 5 to 7 times each day. 13. As you have less pain, try bending over a little farther to do this exercise. This will increase the amount of movement at your shoulder. Posterior stretching exercise    10. Hold the elbow of your injured arm with your other hand. 11. Use your hand to pull your injured arm gently up and across your body. You will feel a gentle stretch across the back of your injured shoulder. 12. Hold for at least 15 to 30 seconds. Then slowly lower your arm. 13. Repeat 2 to 4 times.   Up-the-back stretch    Your doctor or physical therapist may want you to wait to do this stretch until you have regained most of your range of motion and strength. You can do this stretch in different ways. Hold any of these stretches for at least 15 to 30 seconds. Repeat them 2 to 4 times. 12. Put your hand in your back pocket. Let it rest there to stretch your shoulder. 13. With your other hand, hold your injured arm (palm outward) behind your back by the wrist. Pull your arm up gently to stretch your shoulder. 14. Next, put a towel over your other shoulder. Put the hand of your injured arm behind your back. Now hold the back end of the towel. With the other hand, hold the front end of the towel in front of your body. Pull gently on the front end of the towel. This will bring your hand farther up your back to stretch your shoulder. Overhead stretch    9. Standing about an arm's length away, grasp onto a solid surface. You could use a countertop, a doorknob, or the back of a sturdy chair. 10. With your knees slightly bent, bend forward with your arms straight. Lower your upper body, and let your shoulders stretch. 11. As your shoulders are able to stretch farther, you may need to take a step or two backward. 12. Hold for at least 15 to 30 seconds. Then stand up and relax. If you had stepped back during your stretch, step forward so you can keep your hands on the solid surface. 13. Repeat 2 to 4 times. Shoulder flexion (lying down)    To make a wand for this exercise, use a piece of PVC pipe or a broom handle with the broom removed. Make the wand about a foot wider than your shoulders. 9. Lie on your back, holding a wand with both hands. Your palms should face down as you hold the wand. 10. Keeping your elbows straight, slowly raise your arms over your head. Raise them until you feel a stretch in your shoulders, upper back, and chest.  11. Hold for 15 to 30 seconds. 12. Repeat 2 to 4 times.   Shoulder rotation (lying down)    To make a wand for this exercise, use a piece of PVC pipe or a broom handle with the broom removed. Make the wand about a foot wider than your shoulders. 10. Lie on your back. Hold a wand with both hands with your elbows bent and palms up. 11. Keep your elbows close to your body, and move the wand across your body toward the sore arm. 12. Hold for 8 to 12 seconds. 13. Repeat 2 to 4 times. Wall climbing (to the side)    Avoid any movement that is straight to your side, and be careful not to arch your back. Your arm should stay about 30 degrees to the front of your side. 7. Stand with your side to a wall so that your fingers can just touch it at an angle about 30 degrees toward the front of your body. 8. Walk the fingers of your injured arm up the wall as high as pain permits. Try not to shrug your shoulder up toward your ear as you move your arm up. 9. Hold that position for a count of at least 15 to 20.  10. Walk your fingers back down to the starting position. 11. Repeat at least 2 to 4 times. Try to reach higher each time. Wall climbing (to the front)    During this stretching exercise, be careful not to arch your back. 5. Face a wall, and stand so your fingers can just touch it. 6. Keeping your shoulder down, walk the fingers of your injured arm up the wall as high as pain permits. (Don't shrug your shoulder up toward your ear.)  7. Hold your arm in that position for at least 15 to 30 seconds. 8. Slowly walk your fingers back down to where you started. 9. Repeat at least 2 to 4 times. Try to reach higher each time. Shoulder blade squeeze    1. Stand with your arms at your sides, and squeeze your shoulder blades together. Do not raise your shoulders up as you squeeze. 2. Hold 6 seconds. 3. Repeat 8 to 12 times. Scapular exercise: Arm reach    1. Lie flat on your back. This exercise is a very slight motion that starts with your arms raised (elbows straight, arms straight).   2. From this position, reach higher toward the ellen or ceiling. Keep your elbows straight. All motion should be from your shoulder blade only. 3. Relax your arms back to where you started. 4. Repeat 8 to 12 times. Arm raise to the side    During this strengthening exercise, your arm should stay about 30 degrees to the front of your side. 1. Slowly raise your injured arm to the side, with your thumb facing up. Raise your arm 60 degrees at the most (shoulder level is 90 degrees). 2. Hold the position for 3 to 5 seconds. Then lower your arm back to your side. If you need to, bring your \"good\" arm across your body and place it under the elbow as you lower your injured arm. Use your good arm to keep your injured arm from dropping down too fast.  3. Repeat 8 to 12 times. 4. When you first start out, don't hold any extra weight in your hand. As you get stronger, you may use a 1-pound to 2-pound dumbbell or a small can of food. Shoulder flexor and extensor exercise    These are isometric exercises. That means you contract your muscles without actually moving. 1. Push forward (flex): Stand facing a wall or doorjamb, about 6 inches or less back. Hold your injured arm against your body. Make a closed fist with your thumb on top. Then gently push your hand forward into the wall with about 25% to 50% of your strength. Don't let your body move backward as you push. Hold for about 6 seconds. Relax for a few seconds. Repeat 8 to 12 times. 2. Push backward (extend): Stand with your back flat against a wall. Your upper arm should be against the wall, with your elbow bent 90 degrees (your hand straight ahead). Push your elbow gently back against the wall with about 25% to 50% of your strength. Don't let your body move forward as you push. Hold for about 6 seconds. Relax for a few seconds. Repeat 8 to 12 times.   Scapular exercise: Wall push-ups    This exercise is best done with your fingers somewhat turned out, rather than straight up and down.  1. Stand facing a wall, about 12 inches to 18 inches away. 2. Place your hands on the wall at shoulder height. 3. Slowly bend your elbows and bring your face to the wall. Keep your back and hips straight. 4. Push back to where you started. 5. Repeat 8 to 12 times. 6. When you can do this exercise against a wall comfortably, you can try it against a counter. You can then slowly progress to the end of a couch, then to a sturdy chair, and finally to the floor. Scapular exercise: Retraction    For this exercise, you will need elastic exercise material, such as surgical tubing or Thera-Band. 1. Put the band around a solid object at about waist level. (A bedpost will work well.) Each hand should hold an end of the band. 2. With your elbows at your sides and bent to 90 degrees, pull the band back. Your shoulder blades should move toward each other. Then move your arms back where you started. 3. Repeat 8 to 12 times. 4. If you have good range of motion in your shoulders, try this exercise with your arms lifted out to the sides. Keep your elbows at a 90-degree angle. Raise the elastic band up to about shoulder level. Pull the band back to move your shoulder blades toward each other. Then move your arms back where you started. Internal rotator strengthening exercise    1. Start by tying a piece of elastic exercise material to a doorknob. You can use surgical tubing or Thera-Band. 2. Stand or sit with your shoulder relaxed and your elbow bent 90 degrees. Your upper arm should rest comfortably against your side. Squeeze a rolled towel between your elbow and your body for comfort. This will help keep your arm at your side. 3. Hold one end of the elastic band in the hand of the painful arm. 4. Slowly rotate your forearm toward your body until it touches your belly. Slowly move it back to where you started. 5. Keep your elbow and upper arm firmly tucked against the towel roll or at your side.   6. Repeat 8 to 12 times. External rotator strengthening exercise    1. Start by tying a piece of elastic exercise material to a doorknob. You can use surgical tubing or Thera-Band. (You may also hold one end of the band in each hand.)  2. Stand or sit with your shoulder relaxed and your elbow bent 90 degrees. Your upper arm should rest comfortably against your side. Squeeze a rolled towel between your elbow and your body for comfort. This will help keep your arm at your side. 3. Hold one end of the elastic band with the hand of the painful arm. 4. Start with your forearm across your belly. Slowly rotate the forearm out away from your body. Keep your elbow and upper arm tucked against the towel roll or the side of your body until you begin to feel tightness in your shoulder. Slowly move your arm back to where you started. 5. Repeat 8 to 12 times. Follow-up care is a key part of your treatment and safety. Be sure to make and go to all appointments, and call your doctor if you are having problems. It's also a good idea to know your test results and keep a list of the medicines you take. Where can you learn more? Go to http://luigi-atif.info/. Enter Isatu Poster in the search box to learn more about \"Rotator Cuff: Exercises. \"  Current as of: March 21, 2017  Content Version: 11.4  © 1126-8618 [employer]. Care instructions adapted under license by [employer] (which disclaims liability or warranty for this information). If you have questions about a medical condition or this instruction, always ask your healthcare professional. [place] [address] any warranty or liability for your use of this information. Healthy Upper Back: Exercises  Your Care Instructions  Here are some examples of exercises for your upper back. Start each exercise slowly. Ease off the exercise if you start to have pain.   Your doctor or physical therapist will tell you when you can start these exercises and which ones will work best for you. How to do the exercises  Lower neck and upper back stretch    14. Stretch your arms out in front of your body. Clasp one hand on top of your other hand. 15. Gently reach out so that you feel your shoulder blades stretching away from each other. 16. Gently bend your head forward. 17. Hold for 15 to 30 seconds. 18. Repeat 2 to 4 times. Midback stretch    If you have knee pain, do not do this exercise. 14. Kneel on the floor, and sit back on your ankles. 15. Lean forward, place your hands on the floor, and stretch your arms out in front of you. Rest your head between your arms. 16. Gently push your chest toward the floor, reaching as far in front of you as possible. 17. Hold for 15 to 30 seconds. 18. Repeat 2 to 4 times. Shoulder rolls    15. Sit comfortably with your feet shoulder-width apart. You can also do this exercise while standing. 16. Roll your shoulders up, then back, and then down in a smooth, circular motion. 17. Repeat 2 to 4 times. Wall push-up    14. Stand against a wall with your feet about 12 to 24 inches back from the wall. If you feel any pain when you do this exercise, stand closer to the wall. 15. Place your hands on the wall slightly wider apart than your shoulders, and lean forward. 16. Gently lean your body toward the wall. Then push back to your starting position. Keep the motion smooth and controlled. 17. Repeat 8 to 12 times. Resisted shoulder blade squeeze    For this exercise, you will need elastic exercise material, such as surgical tubing or Thera-Band. 13. Sit or stand, holding the band in both hands in front of you. Keep your elbows close to your sides, bent at a 90-degree angle. Your palms should face up. 14. Squeeze your shoulder blades together, and move your arms to the outside, stretching the band. Be sure to keep your elbows at your sides while you do this. 15. Relax. 16. Repeat 8 to 12 times.   Resisted rows    For this exercise, you will need elastic exercise material, such as surgical tubing or Thera-Band. 14. Put the band around a solid object, such as a bedpost, at about waist level. Hold one end of the band in each hand. 15. With your elbows at your sides and bent to 90 degrees, pull the band back to move your shoulder blades toward each other. Return to the starting position. 16. Repeat 8 to 12 times. Follow-up care is a key part of your treatment and safety. Be sure to make and go to all appointments, and call your doctor if you are having problems. It's also a good idea to know your test results and keep a list of the medicines you take. Where can you learn more? Go to http://luigi-atif.info/. Enter I540 in the search box to learn more about \"Healthy Upper Back: Exercises. \"  Current as of: March 21, 2017  Content Version: 11.4  © 8801-5997 [employer]. Care instructions adapted under license by [employer] (which disclaims liability or warranty for this information). If you have questions about a medical condition or this instruction, always ask your healthcare professional. [address] any warranty or liability for your use of this information. Neck: Exercises  Your Care Instructions  Here are some examples of typical rehabilitation exercises for your condition. Start each exercise slowly. Ease off the exercise if you start to have pain. Your doctor or physical therapist will tell you when you can start these exercises and which ones will work best for you. How to do the exercises  Neck stretch    19. This stretch works best if you keep your shoulder down as you lean away from it. To help you remember to do this, start by relaxing your shoulders and lightly holding on to your thighs or your chair. 20. Tilt your head toward your shoulder and hold for 15 to 30 seconds. Let the weight of your head stretch your muscles.   21. If you would like a little added stretch, use your hand to gently and steadily pull your head toward your shoulder. For example, keeping your right shoulder down, lean your head to the left. 22. Repeat 2 to 4 times toward each shoulder. Diagonal neck stretch    19. Turn your head slightly toward the direction you will be stretching, and tilt your head diagonally toward your chest and hold for 15 to 30 seconds. 20. If you would like a little added stretch, use your hand to gently and steadily pull your head forward on the diagonal.  21. Repeat 2 to 4 times toward each side. Dorsal glide stretch    The dorsal glide stretches the back of the neck. If you feel pain, do not glide so far back. Some people find this exercise easier to do while lying on their backs with an ice pack on the neck. 18. Sit or stand tall and look straight ahead. 19. Slowly tuck your chin as you glide your head backward over your body  20. Hold for a count of 6, and then relax for up to 10 seconds. 21. Repeat 8 to 12 times. Chest and shoulder stretch    18. Sit or stand tall and glide your head backward as in the dorsal glide stretch. 19. Raise both arms so that your hands are next to your ears. 20. Take a deep breath, and as you breathe out, lower your elbows down and behind your back. You will feel your shoulder blades slide down and together, and at the same time you will feel a stretch across your chest and the front of your shoulders. 21. Hold for about 6 seconds, and then relax for up to 10 seconds. 22. Repeat 8 to 12 times. Strengthening: Hands on head    17. Move your head backward, forward, and side to side against gentle pressure from your hands, holding each position for about 6 seconds. 18. Repeat 8 to 12 times. Follow-up care is a key part of your treatment and safety. Be sure to make and go to all appointments, and call your doctor if you are having problems.  It's also a good idea to know your test results and keep a list of the medicines you take. Where can you learn more? Go to http://luigi-atif.info/. Enter P975 in the search box to learn more about \"Neck: Exercises. \"  Current as of: March 21, 2017  Content Version: 11.4  © 7461-0474 [employer]. Care instructions adapted under license by [employer] (which disclaims liability or warranty for this information). If you have questions about a medical condition or this instruction, always ask your healthcare professional. [address] any warranty or liability for your use of this information. Managing Your Allergies: Care Instructions  Your Care Instructions    Managing your allergies is an important part of staying healthy. Your doctor will help you find out what may be causing the allergies. Common causes of allergy symptoms are house dust and dust mites, animal dander, mold, and pollen. As soon as you know what triggers your symptoms, try to reduce your exposure to your triggers. This can help prevent allergy symptoms, asthma, and other health problems. Ask your doctor about allergy medicine or immunotherapy. These treatments may help reduce or prevent allergy symptoms. Follow-up care is a key part of your treatment and safety. Be sure to make and go to all appointments, and call your doctor if you are having problems. It's also a good idea to know your test results and keep a list of the medicines you take. How can you care for yourself at home? · If you think that dust or dust mites are causing your allergies:  ¨ Wash sheets, pillowcases, and other bedding every week in hot water. ¨ Use airtight, dust-proof covers for pillows, duvets, and mattresses. Avoid plastic covers, because they tend to tear quickly and do not \"breathe. \" Wash according to the instructions. ¨ Remove extra blankets and pillows that you don't need. ¨ Use blankets that are machine-washable. ¨ Don't use home humidifiers. They can help mites live longer. · Use air-conditioning. Change or clean all filters every month. Keep windows closed. Use high-efficiency air filters. Don't use window or attic fans, which draw dust into the air. · If you're allergic to pet dander, keep pets outside or, at the very least, out of your bedroom. Old carpet and cloth-covered furniture can hold a lot of animal dander. You may need to replace them. · Look for signs of cockroaches. Use cockroach baits to get rid of them. Then clean your home well. · If you're allergic to mold, don't keep indoor plants, because molds can grow in soil. Get rid of furniture, rugs, and drapes that smell musty. Check for mold in the bathroom. · If you're allergic to pollen, stay inside when pollen counts are high. · Don't smoke or let anyone else smoke in your house. Don't use fireplaces or wood-burning stoves. Avoid paint fumes, perfumes, and other strong odors. When should you call for help? Give an epinephrine shot if:  ? · You think you are having a severe allergic reaction. ? After giving an epinephrine shot call 911, even if you feel better. ?Call 911 if:  ? · You have symptoms of a severe allergic reaction. These may include:  ¨ Sudden raised, red areas (hives) all over your body. ¨ Swelling of the throat, mouth, lips, or tongue. ¨ Trouble breathing. ¨ Passing out (losing consciousness). Or you may feel very lightheaded or suddenly feel weak, confused, or restless. ? · You have been given an epinephrine shot, even if you feel better. ?Call your doctor now or seek immediate medical care if:  ? · You have symptoms of an allergic reaction, such as:  ¨ A rash or hives (raised, red areas on the skin). ¨ Itching. ¨ Swelling. ¨ Belly pain, nausea, or vomiting. ? Watch closely for changes in your health, and be sure to contact your doctor if:  ? · Your allergies get worse. ? · You need help controlling your allergies.    ? · You have questions about allergy testing. ? · You do not get better as expected. Where can you learn more? Go to http://luigi-atif.info/. Enter L249 in the search box to learn more about \"Managing Your Allergies: Care Instructions. \"  Current as of: September 29, 2016  Content Version: 11.4  © 6388-5806 [employer]. Care instructions adapted under license by [employer] (which disclaims liability or warranty for this information). If you have questions about a medical condition or this instruction, always ask your healthcare professional. [address] any warranty or liability for your use of this information.

## 2018-04-25 ENCOUNTER — TELEPHONE (OUTPATIENT)
Dept: FAMILY MEDICINE CLINIC | Age: 78
End: 2018-04-25

## 2018-04-25 NOTE — TELEPHONE ENCOUNTER
----- Message from Gisella Villalobos sent at 4/25/2018  9:24 AM EDT -----  Regarding: Dr. Victor M Salazar Telephone  Patient would like a call back to [phone]. Her feet are swollen and she would like to have an order for massage therapy sent to her assisted living.

## 2018-04-26 NOTE — TELEPHONE ENCOUNTER
As discussed with Nurse Courtenay Gowers, verbal order was given to allow Massotherapy at the [facility] for chronic BL leg edema. Will have nurse follow up.

## 2018-04-27 NOTE — TELEPHONE ENCOUNTER
Spoke to patient. Verified two patient identifiers. Pt states that her fingers are dark and her feet are swollen. Scheduled an appt for 5/01/2018 @10:30 to see Dr. Trav Nuñez. Advised pt to elevate legs to decrease swelling. Patient verbalized understanding of information, and has no further questions at this time.

## 2018-04-27 NOTE — TELEPHONE ENCOUNTER
Patient requesting a return call from dr Antonio Hercules. Says her fingers are dark and her feet are swollen. Her contact # is [phone].

## 2018-04-30 NOTE — TELEPHONE ENCOUNTER
MEGHANA from Dr. Sims Daily for Massotherapy as pt requested for chronic bilat leg swelling. Writer faxed V.O to [facility], [address] [facility] pt is currently living at.

## 2018-05-01 ENCOUNTER — OFFICE VISIT (OUTPATIENT)
Dept: FAMILY MEDICINE CLINIC | Age: 78
End: 2018-05-01

## 2018-05-01 VITALS
DIASTOLIC BLOOD PRESSURE: 72 MMHG | WEIGHT: 290 LBS | BODY MASS INDEX: 54.75 KG/M2 | TEMPERATURE: 98.4 F | SYSTOLIC BLOOD PRESSURE: 103 MMHG | HEART RATE: 82 BPM | HEIGHT: 61 IN | RESPIRATION RATE: 16 BRPM | OXYGEN SATURATION: 97 %

## 2018-05-01 DIAGNOSIS — R74.01 ELEVATED AST (SGOT): ICD-10-CM

## 2018-05-01 DIAGNOSIS — M17.0 PRIMARY OSTEOARTHRITIS OF BOTH KNEES: ICD-10-CM

## 2018-05-01 DIAGNOSIS — Z99.3 WHEELCHAIR DEPENDENT: ICD-10-CM

## 2018-05-01 DIAGNOSIS — G30.1 SENILE DEMENTIA OF ALZHEIMER'S TYPE (HCC): Chronic | ICD-10-CM

## 2018-05-01 DIAGNOSIS — I10 ESSENTIAL HYPERTENSION: ICD-10-CM

## 2018-05-01 DIAGNOSIS — R73.9 HYPERGLYCEMIA: ICD-10-CM

## 2018-05-01 DIAGNOSIS — T50.2X5A DIURETIC-INDUCED HYPOKALEMIA: ICD-10-CM

## 2018-05-01 DIAGNOSIS — Z78.9 STATIN INTOLERANCE: ICD-10-CM

## 2018-05-01 DIAGNOSIS — E87.6 DIURETIC-INDUCED HYPOKALEMIA: ICD-10-CM

## 2018-05-01 DIAGNOSIS — M79.89 FOOT SWELLING: Primary | ICD-10-CM

## 2018-05-01 DIAGNOSIS — J30.89 SEASONAL ALLERGIC RHINITIS DUE TO OTHER ALLERGIC TRIGGER: ICD-10-CM

## 2018-05-01 DIAGNOSIS — F02.80 SENILE DEMENTIA OF ALZHEIMER'S TYPE (HCC): Chronic | ICD-10-CM

## 2018-05-01 DIAGNOSIS — E78.5 DYSLIPIDEMIA: ICD-10-CM

## 2018-05-01 DIAGNOSIS — I89.0 LYMPHEDEMA: ICD-10-CM

## 2018-05-01 DIAGNOSIS — L81.9 DISCOLORATION OF SKIN OF HAND: ICD-10-CM

## 2018-05-01 DIAGNOSIS — R26.81 GAIT INSTABILITY: ICD-10-CM

## 2018-05-01 DIAGNOSIS — I25.10 CORONARY ARTERY DISEASE INVOLVING NATIVE CORONARY ARTERY OF NATIVE HEART WITHOUT ANGINA PECTORIS: ICD-10-CM

## 2018-05-01 DIAGNOSIS — R29.898 WEAKNESS OF BOTH LOWER EXTREMITIES: ICD-10-CM

## 2018-05-01 RX ORDER — TRAMADOL HYDROCHLORIDE 50 MG/1
TABLET ORAL
COMMUNITY
Start: 2018-02-19 | End: 2018-05-01 | Stop reason: ALTCHOICE

## 2018-05-01 RX ORDER — FLUTICASONE PROPIONATE 50 MCG
2 SPRAY, SUSPENSION (ML) NASAL DAILY
Qty: 3 BOTTLE | Refills: 1 | Status: SHIPPED | OUTPATIENT
Start: 2018-05-01 | End: 2018-07-23 | Stop reason: SDUPTHER

## 2018-05-01 RX ORDER — ACETAMINOPHEN 325 MG/1
TABLET ORAL
COMMUNITY

## 2018-05-01 RX ORDER — GLUCOSAMINE SULFATE 1500 MG
POWDER IN PACKET (EA) ORAL DAILY
COMMUNITY
End: 2018-05-01 | Stop reason: ALTCHOICE

## 2018-05-01 RX ORDER — SERTRALINE HYDROCHLORIDE 50 MG/1
TABLET, FILM COATED ORAL
COMMUNITY
Start: 2018-04-20

## 2018-05-01 RX ORDER — ONDANSETRON 8 MG/1
TABLET, ORALLY DISINTEGRATING ORAL
COMMUNITY
Start: 2018-04-18 | End: 2018-05-01 | Stop reason: ALTCHOICE

## 2018-05-01 RX ORDER — MIRTAZAPINE 15 MG/1
TABLET, ORALLY DISINTEGRATING ORAL
COMMUNITY
Start: 2018-04-20

## 2018-05-01 RX ORDER — PANTOPRAZOLE SODIUM 40 MG/1
TABLET, DELAYED RELEASE ORAL
COMMUNITY
Start: 2018-04-20

## 2018-05-01 RX ORDER — GLUCOSAMINE/CHONDR SU A SOD 167-133 MG
500 CAPSULE ORAL 2 TIMES DAILY WITH MEALS
COMMUNITY

## 2018-05-01 RX ORDER — POTASSIUM CHLORIDE 750 MG/1
TABLET, FILM COATED, EXTENDED RELEASE ORAL DAILY
COMMUNITY
Start: 2018-04-20

## 2018-05-01 NOTE — PROGRESS NOTES
HISTORY OF PRESENT ILLNESS  Fercho Gipson is a 76 y.o. female presents with Shoulder Pain (right shoulder); Arm Pain (right arm); Knee Pain (both knees); Foot Swelling (both feet); Leg Swelling (both legs); Arm swelling (both arms); Foot Pain (\"burning on the bottom and hurting on the side. \"; both feet); and Hand Injury (\"fingers have turned dark. \")    Agree with nurse note. Pt brought a physician order form from the [facility] that she needs me to complete. Hypertensive, hyperglycemic pt with dementia, CAD, statin intolerance, hx of diuretic-induced hypokalemia and elevated AST presents to the office with a BP of 103/72. For BP she takes Amlodipine 2.5 mg and Lisinopril 5 mg daily. She uses Bumex 1 mg daily. Reviewed recent labs with pt. On 12/5/17 her A1c was 6.2, unchanged from 06/2015. She does not drink soda, but will drink 3 cups of sweet tea daily. She also drinks about two 4 oz glasses of cranberry juice in the AM. She drinks 5 glasses of water daily. For breakfast she typically eats cornflakes, an egg and sometimes sausage and toast. For lunch she will eat a vegetable, meat and deserts. For dinner she also eats a vegetable, meat and dessert and will sometimes eat starches. She reports bilateral hand darkness first noticed 3-4 weeks ago. According to my last OV note on 4/5/18 she had mentioned hand darkening at this time. Pt sts she has had hand darkness before, but not this dark. She wonders if they are dark secondary to prediabetes. She denies any darkening in her armpit, excessive skin dryness during the winter months, or sitting in the sun. While she is sitting her wheelchair she is rubbing her fingers with her opposite hand form the tops of knuckles to her top of her fingers. Pt uses dove lotion but has noticed left arm dry skin despite using this. She has not changed her body soap.  She occasional stiffness with on and off right thumb pain, but pt sts this is \"not a regular thing. \"      She also has had bilateral LE darkness. She is wheelchair bound and has chronic LE edema, but foot and ankle swelling has worsened in the past two weeks to the point that she cannot put shoes on. She does not add salt to her food and notes her assisted living facility does not cook with much salt. She has been doing pedal pump exercises regularly. She admits she does not elevate her feet. She does not have compression hose. Her last potassium was 3.1. She has been taking Bumex 1 mg daily without relief of bilateral feet and ankle swelling. She denies any trauma to her feet. She did not have PT for her neck and gait instability because she was unaware she had to call to make an appointment. She also did not go to the lymphedema clinic. She had states she chest pain last week secondary to coughing from her allergies. She also had nasal congestion last week. She clears her throat throughout the exam and has an occasional cough. For allergies she takes Claritin and Singular as noted on her list from the assisted living facility, but they have not included Flonase. Pt does not want to get the flu shot. She denies any nausea. Written by Eugenio Gracia, as dictated by Dr. Sekou Contreras DO.      ROS    Review of Systems negative except as noted above in HPI.     ALLERGIES:    Allergies   Allergen Reactions    Aristocort [Triamcinolone] Hives     Aristocort shot    Codeine Itching    Lasix [Furosemide] Itching    Bupropion Other (comments)     Severe headache    Darvocet A500 [Propoxyphene N-Acetaminophen] Other (comments)     headache    Influenza Virus Vaccine Whole Other (comments)     pharyngitis    Mevacor [Lovastatin] Other (comments)     myalgias    Percocet [Oxycodone-Acetaminophen] Itching     Worse headache    Rhinocort Other (comments)     nosebleed    Simvastatin Other (comments)     myalgias       CURRENT MEDICATIONS:    Outpatient Prescriptions Marked as Taking for the 5/1/18 encounter (Office Visit) with Nnamdi Green, DO   Medication Sig Dispense Refill    sertraline (ZOLOFT) 50 mg tablet       potassium chloride SR (KLOR-CON 10) 10 mEq tablet       pantoprazole (PROTONIX) 40 mg tablet       mirtazapine (REMERON SOL-TAB) 15 mg disintegrating tablet       acetaminophen (TYLENOL) 325 mg tablet Take  by mouth every four (4) hours as needed for Pain.  nicotinic acid (NIACIN) 500 mg tablet Take 500 mg by mouth two (2) times daily (with meals). Indications: hypertriglyceridemia      fluticasone (FLONASE) 50 mcg/actuation nasal spray 2 Sprays by Both Nostrils route daily. Indications: Allergic Rhinitis 3 Bottle 1    montelukast (SINGULAIR) 10 mg tablet       loratadine (CLARITIN) 10 mg tablet Take 1 Tab by mouth daily. 90 Tab 3    aspirin 81 mg chewable tablet Take 1 Tab by mouth daily. Indications: prevention of cerebrovascular accident 80 Tab 3    OTHER Please dispense an Ultra light Wheelchair DX.severe osteoarthritis, fibromyalgia, Bell's Palsy, DJD,muscle weakness, lumbar stenosis, knee and shoulder pain 1 Device 0    amlodipine (NORVASC) 2.5 mg tablet Take 1 tablet by mouth daily. 30 tablet 11    bumetanide (BUMEX) 1 mg tablet TAKE 1 TABLET BY MOUTH EVERY DAY 30 tablet 11    lisinopril (PRINIVIL, ZESTRIL) 5 mg tablet Take 1 tablet by mouth daily. 30 tablet 11       PAST MEDICAL HISTORY:    Past Medical History:   Diagnosis Date    Abnormal cardiovascular stress test 04/18/08    Dr. Cornelio Almeida    Allergy, unspecified not elsewhere classified     Bell's palsy 02/2005    Left. Dr. Joe Baez CAD (coronary artery disease)     Dr. Jing Coley. Dr. Deni Mckeon Chest pain 2002    Dr. Rain Casarez Chronic headache 2013    due to depression. Dr. Alecia Goldman.  Chronic pain     LEGS AND FEET; BACK UNDER RIGHT SHOULDER    Congenital eye disorder     Lazy eyes.   Dr. Keller Push Dementia     Dr. Brandon Busby.  Depression 2011    Dr. Merrill Oswald (psychologist). Dr. Pauline Hogan. Dr. Jenifer Porras.  DJD (degenerative joint disease)     knees, low back. Dr. Nilesh Zhao. Dr. Tabatha Corrigan. Dr. Yousuf Freedman Gait instability 02/10/09    Dr. Ray Read. Dr. Pauline Hogan.  Gallstones     GERD (gastroesophageal reflux disease)     Hiatal hernia 1977    Hypercholesteremia     Hypertension     Ill-defined condition     \"JUST ONE NIGHT I WENT TO STAND UP AND I JUST COULDN'T STAND\"  PT NOT SURE WHY    Leg weakness 12/2009    Dr. Brandon Busby. Dr. Sorin Sandra.  Lower extremity venous stasis     Dr. Armen Landa    Lumbar spinal stenosis 12/2009    L3-5. Dr. Ishmael Moya. PARAPLEGIA SINCE 2012    Lymphedema 12/19/08    Dr. Luis Lucas then Dr. Amarjit Long Memory loss 2013    Dr. Rocky Alaniz.  Menopause     Migraine     Ocular. Dr. Nabeel Gibson    NORMA (obstructive sleep apnea) 01/24/07    Dr. Patsy Leyva. NO CPAP    Osteoporosis 02/10/09    Dr. Andrew Mahan    Pancreatitis 03/2011    Post-menopausal bleeding 07/2016    due to Dr. Lexy Rebolledo Jr.    Rotator cuff injury     Left. Dr. Lorenza Adhikari; chronic bilateral    Seborrheic keratosis 2005    Dr. Annika Acosta 02/05/09    Dr. Ayla Vazquez   Jannell Erb Tremor 2013    Dr. Pauline Hogan.  Unspecified vitamin D deficiency 07/2009    Urinary incontinence 2013    Dr. Brooke Handy Venous insufficiency 1998    R>L. with chronic leg edema. Dr. Tammy Cormier       PAST SURGICAL HISTORY:    Past Surgical History:   Procedure Laterality Date    HX HEART CATHETERIZATION  06/2008    Dr. Ulysses Martel. due to abnormal Stress Test    HX MAR AND BSO  09/14/2016    Maegan Mane HYSTEROSCOPIC MYOMECTOMY  07/27/2016    D AND C WITH SYMPHION, RESECTION OF ENDOMETRIAL TISSUE. benign. due to postmenopausal bleeding. Dr. Cordelia Gaxiola.        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 Pt lived with smoker dad x 40 yrs    Alcohol use No    Drug use: No    Sexual activity: Yes     Partners: Male     Birth control/ protection: Condom     Other Topics Concern    None     Social History Narrative    Lives in [place] at [facility]:    Immunization History   Administered Date(s) Administered    TB Skin Test (PPD) Intradermal 08/05/2013    TD Vaccine 03/28/2008         PHYSICAL EXAMINATION    Vital Signs    Visit Vitals    /72 (BP 1 Location: Right arm, BP Patient Position: Sitting)    Pulse 82    Temp 98.4 °F (36.9 °C) (Oral)    Resp 16    Ht 5' 1\" (1.549 m)    Wt 290 lb (131.5 kg)    SpO2 97%    BMI 54.8 kg/m2       Weight Metrics 5/1/2018 4/5/2018 1/27/2018 1/9/2018 1/2/2018 12/5/2017 11/14/2017   Weight 290 lb - 290 lb - 264 lb - 264 lb 12.4 oz   BMI 54.8 kg/m2 - 54.8 kg/m2 - 49.88 kg/m2 - 50.03 kg/m2   Some encounter information is confidential and restricted. Go to Review Flowsheets activity to see all data. General appearance - Well nourished. Well appearing. Well developed. No acute distress. Obese. Head - Normocephalic. Atraumatic.     Eyes - pupils equal and reactive. Extraocular eye movements intact. Sclera anicteric. Mildly injected sclera. Ears - Hearing is grossly normal bilaterally. Nose - normal and patent. No polyps noted. No erythema. No discharge. Mouth - mucous membranes with adequate moisture. Posterior pharynx normal with cobblestone appearance. No erythema, white exudate or obstruction. Neck - supple. Midline trachea. No carotid bruits noted bilaterally. No thyromegaly noted. Chest - clear to auscultation bilaterally anteriorly and posteriorly. No wheezes. No rales or rhonchi. Breath sounds are symmetrical bilaterally. Unlabored respirations. Heart - normal rate. Regular rhythm. Normal S1, S2. No murmur noted. No rubs, clicks or gallops noted. Abdomen - soft and distended. No masses or organomegaly. No rebound, rigidity or guarding. Bowel sounds normal x 4 quadrants. No tenderness noted. Neurological - awake, alert and oriented to person, place, and time and event. Cranial nerves II through XII intact. Clear speech. Muscle strength is +5/5 x 4 extremities. Sensation is intact to light touch bilaterally. Pt is wheelchair bound. Heme/Lymph - peripheral pulses normal x 4 extremities. Trace nonpitting edema in bilateral LE, worse at bilateral ankles and feet, tender to light touch    Musculoskeletal - Intact x 4 extremities. Decreased ROM x 4 extremities. BL LE and shoudler pain with movement. Back exam - normal range of motion. No pain on palpation of the spinous processes in the cervical, thoracic, lumbar, sacral regions. No CVA tenderness. Skin - no erythema, ecchymosis, lacerations, abrasions, suspicious moles noted. White scaly patches on right forearm. Psychological -   normal behavior, dress and thought processes. occ good insight. Good eye contact. Normal affect. Polite mood. Normal speech.       DATA REVIEWED    Results for orders placed or performed during the hospital encounter of 01/27/18   CBC WITH AUTOMATED DIFF   Result Value Ref Range    WBC 10.1 3.6 - 11.0 K/uL    RBC 4.02 3.80 - 5.20 M/uL    HGB 11.4 (L) 11.5 - 16.0 g/dL    HCT 37.2 35.0 - 47.0 %    MCV 92.5 80.0 - 99.0 FL    MCH 28.4 26.0 - 34.0 PG    MCHC 30.6 30.0 - 36.5 g/dL    RDW 15.1 (H) 11.5 - 14.5 %    PLATELET 170 570 - 366 K/uL    MPV 11.0 8.9 - 12.9 FL    NRBC 0.0 0  WBC    ABSOLUTE NRBC 0.00 0.00 - 0.01 K/uL    NEUTROPHILS 72 32 - 75 %    LYMPHOCYTES 19 12 - 49 %    MONOCYTES 8 5 - 13 %    EOSINOPHILS 1 0 - 7 %    BASOPHILS 0 0 - 1 %    IMMATURE GRANULOCYTES 0 0.0 - 0.5 %    ABS. NEUTROPHILS 7.2 1.8 - 8.0 K/UL    ABS. LYMPHOCYTES 1.9 0.8 - 3.5 K/UL    ABS. MONOCYTES 0.8 0.0 - 1.0 K/UL    ABS. EOSINOPHILS 0.1 0.0 - 0.4 K/UL    ABS. BASOPHILS 0.0 0.0 - 0.1 K/UL    ABS. IMM. GRANS. 0.0 0.00 - 0.04 K/UL    DF AUTOMATED     METABOLIC PANEL, COMPREHENSIVE   Result Value Ref Range    Sodium 140 136 - 145 mmol/L    Potassium 3.1 (L) 3.5 - 5.1 mmol/L    Chloride 102 97 - 108 mmol/L    CO2 30 21 - 32 mmol/L    Anion gap 8 5 - 15 mmol/L    Glucose 193 (H) 65 - 100 mg/dL    BUN 14 6 - 20 MG/DL    Creatinine 0.96 0.55 - 1.02 MG/DL    BUN/Creatinine ratio 15 12 - 20      GFR est AA >60 >60 ml/min/1.73m2    GFR est non-AA 56 (L) >60 ml/min/1.73m2    Calcium 8.5 8.5 - 10.1 MG/DL    Bilirubin, total 0.4 0.2 - 1.0 MG/DL    ALT (SGPT) 42 12 - 78 U/L    AST (SGOT) 107 (H) 15 - 37 U/L    Alk.  phosphatase 110 45 - 117 U/L    Protein, total 6.9 6.4 - 8.2 g/dL    Albumin 3.1 (L) 3.5 - 5.0 g/dL    Globulin 3.8 2.0 - 4.0 g/dL    A-G Ratio 0.8 (L) 1.1 - 2.2     LIPASE   Result Value Ref Range    Lipase 240 73 - 393 U/L   EKG, 12 LEAD, INITIAL   Result Value Ref Range    Ventricular Rate 72 BPM    Atrial Rate 72 BPM    P-R Interval 192 ms    QRS Duration 112 ms    Q-T Interval 414 ms    QTC Calculation (Bezet) 453 ms    Calculated P Axis 32 degrees    Calculated T Axis 11 degrees    Diagnosis       Normal sinus rhythm  Minimal voltage criteria for LVH, may be normal variant  Nonspecific T wave abnormality  When compared with ECG of 14-NOV-2017 10:19,  Nonspecific T wave abnormality, worse in Lateral leads  Confirmed by Starla Lehay ([id number]) on 1/28/2018 5:05:05 PM           ASSESSMENT and PLAN      ICD-10-CM ICD-9-CM    1. Foot swelling M79.89 729.81     Bilaterally, worse x 2 weeks due to sedentary position in wheelchair, Asymptomatic   2. Essential hypertension I10 401.9 MICROALBUMIN, UR, RAND W/ MICROALB/CREAT RATIO      URINALYSIS W/ RFLX MICROSCOPIC    stable   3. Discoloration of skin of hand L81.9 709.00     hyperpigmented from PIP joints to finger tips BL hands all digits due to Insulin Resistance/Acanthosis nigricans vs trauma from rubbing her fingers vs other   4. Seasonal allergic rhinitis due to other allergic trigger J30.89 477.8    5. Senile dementia of Alzheimer's type G30.1 331.0     F02.80 294.10    6. Dyslipidemia E78.5 272.4 LIPID PANEL      METABOLIC PANEL, COMPREHENSIVE   7. Elevated AST (SGOT) C78.0 109.7 METABOLIC PANEL, COMPREHENSIVE   8. Diuretic-induced hypokalemia E87.6 276.8     T50.2X5A E944.4     due to Bumex use vs other   9. Hyperglycemia Q08.2 794.65 METABOLIC PANEL, COMPREHENSIVE   10. Wheelchair dependent Z99.3 V46.3    11. Coronary artery disease involving native coronary artery of native heart without angina pectoris I25.10 414.01    12. Statin intolerance Z78.9 995.27    13. Gait instability R26.81 781.2    14. Lymphedema I89.0 457.1    15. Weakness of both lower extremities R29.898 729.89    16. Primary osteoarthritis of both knees M17.0 715.16     severe, inoperable       Discussed the patient's BMI with her. The BMI follow up plan is as follows: I have counseled this patient on diet and exercise regimens. Decrease carbohydrates (white foods, sweet foods, sweet drinks and alcohol), increase green leafy vegetables and protein (lean meats and beans) with each meal.  Avoid fried foods.  Eat 3-5 small meals daily. Do not skip meals. Increase water intake. Increase physical activity to 30 minutes daily for health benefit or 60 minutes daily to prevent weight regain, as tolerated. Get 7-8 hours uninterrupted sleep nightly. Chart reviewed and updated. Form for [facility] Completed. Increase proteins and greens intake . Continue current medications and care. Gave rx for Flonase today. Instructed her to keep her windows closed during allergy season. Most recent tests reviewed. Gave lab slip for her to have pertinent labs done in the end of May. She has already been referred to lymphedema and PT. Will give new copy of referral. Will await their recommendation for compression stockings. Patient urged to keep appointments with specialists. Counseled patient on health concerns: foot swelling, hand darkening, prediabetes, allergy hygiene  Advised to keep her hands moisturized. Start applying Cetaphil cream to affected area. Elevate feet, continue pedal pump exercises and wear compression hose  Use OTC steroid nasal spray  Limit sweet drinks, white foods and sweet foods. Relevant handouts given and discussed with patient. Immunizations noted. Pt declined flu shot. Offered empathy, support, legitimation, prayers, partnership to patient. Praised patient for progress. Follow-up Disposition:  Return in about 3 months (around 8/1/2018) for blood pressure, referral follow up. Patient was offered a choice/choices in the treatment plan today. Patient expresses understanding of the plan and agrees with recommendations. Written by Pio Garcia, as dictated by Dr. Moody Canales DO. Documentation True and Accepted by Loy Padron. Abiodun Hughes. Patient Instructions          Seasonal Allergies: Care Instructions  Your Care Instructions  Allergies occur when your body's defense system (immune system) overreacts to certain substances.  The immune system treats a harmless substance as if it were a harmful germ or virus. Many things can cause this to happen. Examples include pollens, medicine, food, dust, animal dander, and mold. Your allergies are seasonal if you have symptoms just at certain times of the year. In that case, you are probably allergic to pollens from certain trees, grasses, or weeds. Allergies can be mild or severe. Over-the-counter allergy medicine may help with some symptoms. Read and follow all instructions on the label. Managing your allergies is an important part of staying healthy. Your doctor may suggest that you have tests to help find the cause of your allergies. When you know what things trigger your symptoms, you can avoid them. This can prevent allergy symptoms and other health problems. In some cases, immunotherapy might help. For this treatment, you get shots or use pills that have a small amount of certain allergens in them. Your body \"gets used to\" the allergen, so you react less to it over time. This kind of treatment may help prevent or reduce some allergy symptoms. Follow-up care is a key part of your treatment and safety. Be sure to make and go to all appointments, and call your doctor if you are having problems. It's also a good idea to know your test results and keep a list of the medicines you take. How can you care for yourself at home? · Be safe with medicines. Take your medicines exactly as prescribed. Call your doctor if you think you are having a problem with your medicine. · During your allergy season, keep windows closed. If you need to use air-conditioning, change or clean all filters every month. Take a shower and change your clothes after you have been outside. · Stay inside when pollen counts are high. Vacuum once or twice a week. Use a vacuum  with a HEPA filter or a double-thickness filter. When should you call for help?   Give an epinephrine shot if:  ? · You think you are having a severe allergic reaction. ? After giving an epinephrine shot, call 911, even if you feel better. ?Call 911 if:  ? · You have symptoms of a severe allergic reaction. These may include:  ¨ Sudden raised, red areas (hives) all over your body. ¨ Swelling of the throat, mouth, lips, or tongue. ¨ Trouble breathing. ¨ Passing out (losing consciousness). Or you may feel very lightheaded or suddenly feel weak, confused, or restless. ? · You have been given an epinephrine shot, even if you feel better. ?Call your doctor now or seek immediate medical care if:  ? · You have symptoms of an allergic reaction, such as:  ¨ A rash or hives (raised, red areas on the skin). ¨ Itching. ¨ Swelling. ¨ Belly pain, nausea, or vomiting. ? Watch closely for changes in your health, and be sure to contact your doctor if:  ? · You do not get better as expected. Where can you learn more? Go to http://luigi-atif.info/. Enter J912 in the search box to learn more about \"Seasonal Allergies: Care Instructions. \"  Current as of: September 29, 2016  Content Version: 11.4  © 9373-4071 [employer]. Care instructions adapted under license by [employer] (which disclaims liability or warranty for this information). If you have questions about a medical condition or this instruction, always ask your healthcare professional. [address] any warranty or liability for your use of this information. Advise patient to start taking Over The Counter allergy medication (Allegra, Zyrtec, Claritin, Xyzal or Alavert) daily. If you have itchy, watery eyes you can try OTC Zaditor or Zyrtec Allergy Eye drops. If you have a stuffy nose, please try OTC Flonase, Flonase Sensimist, Rhinocort, or Nasacort AQ 2 squirts up each nostril once a day (adults) or 1 squirt up each nostril once a day (children). Use allergy free, dye free, fragrance free products on your body and hair.   After being outdoors, brush hair vigorously, wash face and arms, rinse nostrils with a nasal saline spray and consider changing your clothing. Dust furniture frequently and wear a mask while doing it. Vacuum floors weekly. Remove stuffed animals or extra pillows from the bed. Clean bedding in hot water weekly. Sometimes allergies can be so severe that 2 nasal sprays and 2 pills may be needed to control symptoms. Advised protocol for clearing congestion:  Increase fluid intake, especially water to thin mucous and boost the immune system. Avoid sugar and dairy while congested since they thicken mucous. Get plenty of rest!  Gargle 3 times daily and as needed in Listerine or warm salt water vinegar solutions (1 tsp salt, 1 tsp vinegar in 1 cup lukewarm water.)  Use OTC nasal saline spray up each nostril twice daily. Use humidifier at bedtime. Use OTC Mucinex 600 mg twice daily to loosen mucous. Use OTC Tylenol Arthritis or Ibuprofen up to 800 mg up to 3 times daily as needed for pain, fever or headaches. Avoid decongestants and Ibuprofen if you have high blood pressure! If mucous is consistently discolored yellow or green throughout the day for more than a week, call the doctor for an evaluation. Starting a Weight Loss Plan: Care Instructions  Your Care Instructions    If you are thinking about losing weight, it can be hard to know where to start. Your doctor can help you set up a weight loss plan that best meets your needs. You may want to take a class on nutrition or exercise, or join a weight loss support group. If you have questions about how to make changes to your eating or exercise habits, ask your doctor about seeing a registered dietitian or an exercise specialist.  It can be a big challenge to lose weight. But you do not have to make huge changes at once. Make small changes, and stick with them. When those changes become habit, add a few more changes.   If you do not think you are ready to make changes right now, try to pick a date in the future. Make an appointment to see your doctor to discuss whether the time is right for you to start a plan. Follow-up care is a key part of your treatment and safety. Be sure to make and go to all appointments, and call your doctor if you are having problems. It's also a good idea to know your test results and keep a list of the medicines you take. How can you care for yourself at home? · Set realistic goals. Many people expect to lose much more weight than is likely. A weight loss of 5% to 10% of your body weight may be enough to improve your health. · Get family and friends involved to provide support. Talk to them about why you are trying to lose weight, and ask them to help. They can help by participating in exercise and having meals with you, even if they may be eating something different. · Find what works best for you. If you do not have time or do not like to cook, a program that offers meal replacement bars or shakes may be better for you. Or if you like to prepare meals, finding a plan that includes daily menus and recipes may be best.  · Ask your doctor about other health professionals who can help you achieve your weight loss goals. ¨ A dietitian can help you make healthy changes in your diet. ¨ An exercise specialist or  can help you develop a safe and effective exercise program.  ¨ A counselor or psychiatrist can help you cope with issues such as depression, anxiety, or family problems that can make it hard to focus on weight loss. · Consider joining a support group for people who are trying to lose weight. Your doctor can suggest groups in your area. Where can you learn more? Go to http://luigi-atif.info/. Enter E237 in the search box to learn more about \"Starting a Weight Loss Plan: Care Instructions. \"  Current as of: October 13, 2016  Content Version: 11.4  © 9524-3929 [employer]. Care instructions adapted under license by [employer] (which disclaims liability or warranty for this information). If you have questions about a medical condition or this instruction, always ask your healthcare professional. [address] any warranty or liability for your use of this information. Leg and Ankle Edema: Care Instructions  Your Care Instructions  Swelling in the legs, ankles, and feet is called edema. It is common after you sit or stand for a while. Long plane flights or car rides often cause swelling in the legs and feet. You may also have swelling if you have to stand for long periods of time at your job. Problems with the veins in the legs (varicose veins) and changes in hormones can also cause swelling. Sometimes the swelling in the ankles and feet is caused by a more serious problem, such as heart failure, infection, blood clots, or liver or kidney disease. Follow-up care is a key part of your treatment and safety. Be sure to make and go to all appointments, and call your doctor if you are having problems. It's also a good idea to know your test results and keep a list of the medicines you take. How can you care for yourself at home? · If your doctor gave you medicine, take it as prescribed. Call your doctor if you think you are having a problem with your medicine. · Whenever you are resting, raise your legs up. Try to keep the swollen area higher than the level of your heart. · Take breaks from standing or sitting in one position. ¨ Walk around to increase the blood flow in your lower legs. ¨ Move your feet and ankles often while you stand, or tighten and relax your leg muscles. · Wear support stockings. Put them on in the morning, before swelling gets worse. · Eat a balanced diet. Lose weight if you need to. · Limit the amount of salt (sodium) in your diet. Salt holds fluid in the body and may increase swelling.   When should you call for help?  Call 911 anytime you think you may need emergency care. For example, call if:  ? · You have symptoms of a blood clot in your lung (called a pulmonary embolism). These may include:  ¨ Sudden chest pain. ¨ Trouble breathing. ¨ Coughing up blood. ?Call your doctor now or seek immediate medical care if:  ? · You have signs of a blood clot, such as:  ¨ Pain in your calf, back of the knee, thigh, or groin. ¨ Redness and swelling in your leg or groin. ? · You have symptoms of infection, such as:  ¨ Increased pain, swelling, warmth, or redness. ¨ Red streaks or pus. ¨ A fever. ? Watch closely for changes in your health, and be sure to contact your doctor if:  ? · Your swelling is getting worse. ? · You have new or worsening pain in your legs. ? · You do not get better as expected. Where can you learn more? Go to http://luigi-atif.info/. Enter N500 in the search box to learn more about \"Leg and Ankle Edema: Care Instructions. \"  Current as of: March 20, 2017  Content Version: 11.4  © 9422-1147 [employer] Care instructions adapted under license by [employer] (which disclaims liability or warranty for this information).  If you have questions about a medical condition or this instruction, always ask your healthcare professional. [address] any warranty or liability for your use of this information.    ]

## 2018-05-01 NOTE — PATIENT INSTRUCTIONS
Seasonal Allergies: Care Instructions  Your Care Instructions  Allergies occur when your body's defense system (immune system) overreacts to certain substances. The immune system treats a harmless substance as if it were a harmful germ or virus. Many things can cause this to happen. Examples include pollens, medicine, food, dust, animal dander, and mold. Your allergies are seasonal if you have symptoms just at certain times of the year. In that case, you are probably allergic to pollens from certain trees, grasses, or weeds. Allergies can be mild or severe. Over-the-counter allergy medicine may help with some symptoms. Read and follow all instructions on the label. Managing your allergies is an important part of staying healthy. Your doctor may suggest that you have tests to help find the cause of your allergies. When you know what things trigger your symptoms, you can avoid them. This can prevent allergy symptoms and other health problems. In some cases, immunotherapy might help. For this treatment, you get shots or use pills that have a small amount of certain allergens in them. Your body \"gets used to\" the allergen, so you react less to it over time. This kind of treatment may help prevent or reduce some allergy symptoms. Follow-up care is a key part of your treatment and safety. Be sure to make and go to all appointments, and call your doctor if you are having problems. It's also a good idea to know your test results and keep a list of the medicines you take. How can you care for yourself at home? · Be safe with medicines. Take your medicines exactly as prescribed. Call your doctor if you think you are having a problem with your medicine. · During your allergy season, keep windows closed. If you need to use air-conditioning, change or clean all filters every month. Take a shower and change your clothes after you have been outside. · Stay inside when pollen counts are high.  Vacuum once or twice a week. Use a vacuum  with a HEPA filter or a double-thickness filter. When should you call for help? Give an epinephrine shot if:  ? · You think you are having a severe allergic reaction. ? After giving an epinephrine shot, call 911, even if you feel better. ?Call 911 if:  ? · You have symptoms of a severe allergic reaction. These may include:  ¨ Sudden raised, red areas (hives) all over your body. ¨ Swelling of the throat, mouth, lips, or tongue. ¨ Trouble breathing. ¨ Passing out (losing consciousness). Or you may feel very lightheaded or suddenly feel weak, confused, or restless. ? · You have been given an epinephrine shot, even if you feel better. ?Call your doctor now or seek immediate medical care if:  ? · You have symptoms of an allergic reaction, such as:  ¨ A rash or hives (raised, red areas on the skin). ¨ Itching. ¨ Swelling. ¨ Belly pain, nausea, or vomiting. ? Watch closely for changes in your health, and be sure to contact your doctor if:  ? · You do not get better as expected. Where can you learn more? Go to http://luigi-atif.info/. Enter J912 in the search box to learn more about \"Seasonal Allergies: Care Instructions. \"  Current as of: September 29, 2016  Content Version: 11.4  © 2822-8322 [employer]. Care instructions adapted under license by [employer] (which disclaims liability or warranty for this information). If you have questions about a medical condition or this instruction, always ask your healthcare professional. [place] [address] any warranty or liability for your use of this information. Advise patient to start taking Over The Counter allergy medication (Allegra, Zyrtec, Claritin, Xyzal or Alavert) daily. If you have itchy, watery eyes you can try OTC Zaditor or Zyrtec Allergy Eye drops.   If you have a stuffy nose, please try OTC Flonase, Flonase Sensimist, Rhinocort, or Nasacort AQ 2 squirts up each nostril once a day (adults) or 1 squirt up each nostril once a day (children). Use allergy free, dye free, fragrance free products on your body and hair. After being outdoors, brush hair vigorously, wash face and arms, rinse nostrils with a nasal saline spray and consider changing your clothing. Dust furniture frequently and wear a mask while doing it. Vacuum floors weekly. Remove stuffed animals or extra pillows from the bed. Clean bedding in hot water weekly. Sometimes allergies can be so severe that 2 nasal sprays and 2 pills may be needed to control symptoms. Advised protocol for clearing congestion:  Increase fluid intake, especially water to thin mucous and boost the immune system. Avoid sugar and dairy while congested since they thicken mucous. Get plenty of rest!  Gargle 3 times daily and as needed in Listerine or warm salt water vinegar solutions (1 tsp salt, 1 tsp vinegar in 1 cup lukewarm water.)  Use OTC nasal saline spray up each nostril twice daily. Use humidifier at bedtime. Use OTC Mucinex 600 mg twice daily to loosen mucous. Use OTC Tylenol Arthritis or Ibuprofen up to 800 mg up to 3 times daily as needed for pain, fever or headaches. Avoid decongestants and Ibuprofen if you have high blood pressure! If mucous is consistently discolored yellow or green throughout the day for more than a week, call the doctor for an evaluation. Starting a Weight Loss Plan: Care Instructions  Your Care Instructions    If you are thinking about losing weight, it can be hard to know where to start. Your doctor can help you set up a weight loss plan that best meets your needs. You may want to take a class on nutrition or exercise, or join a weight loss support group. If you have questions about how to make changes to your eating or exercise habits, ask your doctor about seeing a registered dietitian or an exercise specialist.  It can be a big challenge to lose weight.  But you do not have to make huge changes at once. Make small changes, and stick with them. When those changes become habit, add a few more changes. If you do not think you are ready to make changes right now, try to pick a date in the future. Make an appointment to see your doctor to discuss whether the time is right for you to start a plan. Follow-up care is a key part of your treatment and safety. Be sure to make and go to all appointments, and call your doctor if you are having problems. It's also a good idea to know your test results and keep a list of the medicines you take. How can you care for yourself at home? · Set realistic goals. Many people expect to lose much more weight than is likely. A weight loss of 5% to 10% of your body weight may be enough to improve your health. · Get family and friends involved to provide support. Talk to them about why you are trying to lose weight, and ask them to help. They can help by participating in exercise and having meals with you, even if they may be eating something different. · Find what works best for you. If you do not have time or do not like to cook, a program that offers meal replacement bars or shakes may be better for you. Or if you like to prepare meals, finding a plan that includes daily menus and recipes may be best.  · Ask your doctor about other health professionals who can help you achieve your weight loss goals. ¨ A dietitian can help you make healthy changes in your diet. ¨ An exercise specialist or  can help you develop a safe and effective exercise program.  ¨ A counselor or psychiatrist can help you cope with issues such as depression, anxiety, or family problems that can make it hard to focus on weight loss. · Consider joining a support group for people who are trying to lose weight. Your doctor can suggest groups in your area. Where can you learn more? Go to http://luigi-atif.info/.   Enter P780 in the search box to learn more about \"Starting a Weight Loss Plan: Care Instructions. \"  Current as of: October 13, 2016  Content Version: 11.4  © 9707-1132 [employer]. Care instructions adapted under license by [employer] (which disclaims liability or warranty for this information). If you have questions about a medical condition or this instruction, always ask your healthcare professional. [address] any warranty or liability for your use of this information. Leg and Ankle Edema: Care Instructions  Your Care Instructions  Swelling in the legs, ankles, and feet is called edema. It is common after you sit or stand for a while. Long plane flights or car rides often cause swelling in the legs and feet. You may also have swelling if you have to stand for long periods of time at your job. Problems with the veins in the legs (varicose veins) and changes in hormones can also cause swelling. Sometimes the swelling in the ankles and feet is caused by a more serious problem, such as heart failure, infection, blood clots, or liver or kidney disease. Follow-up care is a key part of your treatment and safety. Be sure to make and go to all appointments, and call your doctor if you are having problems. It's also a good idea to know your test results and keep a list of the medicines you take. How can you care for yourself at home? · If your doctor gave you medicine, take it as prescribed. Call your doctor if you think you are having a problem with your medicine. · Whenever you are resting, raise your legs up. Try to keep the swollen area higher than the level of your heart. · Take breaks from standing or sitting in one position. ¨ Walk around to increase the blood flow in your lower legs. ¨ Move your feet and ankles often while you stand, or tighten and relax your leg muscles. · Wear support stockings. Put them on in the morning, before swelling gets worse. · Eat a balanced diet.  Lose weight if you need to. · Limit the amount of salt (sodium) in your diet. Salt holds fluid in the body and may increase swelling. When should you call for help? Call 911 anytime you think you may need emergency care. For example, call if:  ? · You have symptoms of a blood clot in your lung (called a pulmonary embolism). These may include:  ¨ Sudden chest pain. ¨ Trouble breathing. ¨ Coughing up blood. ?Call your doctor now or seek immediate medical care if:  ? · You have signs of a blood clot, such as:  ¨ Pain in your calf, back of the knee, thigh, or groin. ¨ Redness and swelling in your leg or groin. ? · You have symptoms of infection, such as:  ¨ Increased pain, swelling, warmth, or redness. ¨ Red streaks or pus. ¨ A fever. ? Watch closely for changes in your health, and be sure to contact your doctor if:  ? · Your swelling is getting worse. ? · You have new or worsening pain in your legs. ? · You do not get better as expected. Where can you learn more? Go to http://luigi-atif.info/. Enter C920 in the search box to learn more about \"Leg and Ankle Edema: Care Instructions. \"  Current as of: March 20, 2017  Content Version: 11.4  © 6580-5179 [employer]. Care instructions adapted under license by [employer] (which disclaims liability or warranty for this information). If you have questions about a medical condition or this instruction, always ask your healthcare professional. [place] [address] any warranty or liability for your use of this information.

## 2018-05-01 NOTE — MR AVS SNAPSHOT
[address] [facility] 
 
 
 [address] 
[address] [place] [address] 
[phone] Patient: Consuelo Taylor MRN:  [id number] Visit Information Date & Time Provider Department Dept. Phone Encounter #  
 2018 10:30 AM Owen Rollins DO [facility] [phone] [id number] Your Appointments 2018 10:00 AM  
ROUTINE CARE with DO Brandon [facility] (JASMIN Harding) Appt Note: 3 month for swelling, neck pain, and arm pain [address] [facility] [address]  
[phone]  
  
   
 [address]  
  
    
 2018 10:20 AM  
Follow Up with Carlton Bundy MD  
[facility] Appt Note: 6 month f/u memory loss NN 18 [address] [facility] [address]  
[phone]  
  
   
 [address] [place] [address]  
  
    
 2018 10:30 AM  
ESTABLISHED PATIENT with Chaz Patel MD  
[facility]) Appt Note: 6 MO. F/U  
 [address] [place] [address]  
[phone] [address] [facility] [address] Upcoming Health Maintenance Date Due Influenza Age 5 to Adult 2018 MEDICARE YEARLY EXAM 2018 GLAUCOMA SCREENING Q2Y 2020 DTaP/Tdap/Td series (2 - Td) 2026 Allergies as of 2018  Review Complete On: 2018 By: Clarissa Reynolds Severity Noted Reaction Type Reactions Aristocort [Triamcinolone] High 1970    Hives Aristocort shot Codeine High 08/10/2009    Itching Lasix [Furosemide] High 08/10/2009    Itching Bupropion  2011    Other (comments) Severe headache Darvocet A500 [Propoxyphene N-acetaminophen]  2010    Other (comments)  
 headache Influenza Virus Vaccine Whole  09/30/2009    Other (comments)  
 pharyngitis Mevacor [Lovastatin]  08/10/2009    Other (comments)  
 myalgias Percocet [Oxycodone-acetaminophen]  07/15/2011    Itching Worse headache Rhinocort  08/10/2009    Other (comments)  
 nosebleed Simvastatin  08/10/2009    Other (comments)  
 myalgias Current Immunizations  Reviewed on 4/5/2018 Name Date Influenza Vaccine Split  Deferred (Contraindication) TB Skin Test (PPD) Intradermal 8/5/2013 TD Vaccine 3/28/2008 ZZZ-RETIRED (DO NOT USE) Pneumococcal Vaccine (Unspecified Type)  Deferred (Patient Refused) Not reviewed this visit You Were Diagnosed With   
  
 Codes Comments Foot swelling    -  Primary ICD-10-CM: M79.89 ICD-9-CM: 729.81 Bilaterally, worse x 2 weeks due to sedentary position in wheelchair, Asymptomatic Essential hypertension     ICD-10-CM: I10 
ICD-9-CM: 401.9 stable Discoloration of skin of hand     ICD-10-CM: L81.9 ICD-9-CM: 709.00 hyperpigmented from PIP joints to finger tips BL hands all digits due to Insulin Resistance/Acanthosis nigricans vs trauma from rubbing her fingers vs other Seasonal allergic rhinitis due to other allergic trigger     ICD-10-CM: J30.89 ICD-9-CM: 477.8 Senile dementia of Alzheimer's type     ICD-10-CM: G30.1, F02.80 ICD-9-CM: 331.0, 294.10 Dyslipidemia     ICD-10-CM: E78.5 ICD-9-CM: 272.4 Elevated AST (SGOT)     ICD-10-CM: R74.0 ICD-9-CM: 790.4 Diuretic-induced hypokalemia     ICD-10-CM: E87.6, T50.2X5A 
ICD-9-CM: 276.8, E944.4 due to Bumex use vs other Hyperglycemia     ICD-10-CM: R73.9 ICD-9-CM: 790.29 Wheelchair dependent     ICD-10-CM: Z99.3 ICD-9-CM: V46.3 Coronary artery disease involving native coronary artery of native heart without angina pectoris     ICD-10-CM: I25.10 ICD-9-CM: 414.01 Statin intolerance     ICD-10-CM: Z78.9 ICD-9-CM: 995.27  Gait instability     ICD-10-CM: R26.81 
 ICD-9-CM: 050. 2 Lymphedema     ICD-10-CM: I89.0 ICD-9-CM: 482.5 Weakness of both lower extremities     ICD-10-CM: R29.898 ICD-9-CM: 729.89 Primary osteoarthritis of both knees     ICD-10-CM: M17.0 ICD-9-CM: 715.16 severe, inoperable Vitals BP Pulse Temp Resp Height(growth percentile) Weight(growth percentile) 103/72 (BP 1 Location: Right arm, BP Patient Position: Sitting) 82 98.4 °F (36.9 °C) (Oral) 16 5' 1\" (1.549 m) 290 lb (131.5 kg) SpO2 BMI OB Status Smoking Status 97% 54.8 kg/m2 Postmenopausal Former Smoker Vitals History BMI and BSA Data Body Mass Index Body Surface Area 54.8 kg/m 2 2.38 m 2 Preferred Pharmacy Pharmacy Name Phone [address] [facility], [place] [address] [phone] Your Updated Medication List  
  
   
This list is accurate as of 5/1/18 12:30 PM.  Always use your most recent med list.  
  
  
  
  
 acetaminophen 325 mg tablet Commonly known as:  TYLENOL Take  by mouth every four (4) hours as needed for Pain. amLODIPine 2.5 mg tablet Commonly known as:  Homer Bars Take 1 tablet by mouth daily. aspirin 81 mg chewable tablet Take 1 Tab by mouth daily. Indications: prevention of cerebrovascular accident  
  
 bumetanide 1 mg tablet Commonly known as:  Sofia Carry TAKE 1 TABLET BY MOUTH EVERY DAY  
  
 fluticasone 50 mcg/actuation nasal spray Commonly known as:  Donohue [place] 2 Sprays by Both Nostrils route daily. Indications: Allergic Rhinitis  
  
 lisinopril 5 mg tablet Commonly known as:  Dorathy Massed Take 1 tablet by mouth daily. loratadine 10 mg tablet Commonly known as:  Susa Fuchs Take 1 Tab by mouth daily. mirtazapine 15 mg disintegrating tablet Commonly known as:  REMERON SOL-TAB  
  
 montelukast 10 mg tablet Commonly known as:  SINGULAIR  
  
 nicotinic acid 500 mg tablet Commonly known as:  NIACIN  
 Take 500 mg by mouth two (2) times daily (with meals). Indications: hypertriglyceridemia OTHER Please dispense an Ultra light Wheelchair DX.severe osteoarthritis, fibromyalgia, Bell's Palsy, DJD,muscle weakness, lumbar stenosis, knee and shoulder pain  
  
 pantoprazole 40 mg tablet Commonly known as:  PROTONIX  
  
 potassium chloride SR 10 mEq tablet Commonly known as:  KLOR-CON 10  
  
 sertraline 50 mg tablet Commonly known as:  ZOLOFT Prescriptions Sent to Pharmacy Refills  
 fluticasone (FLONASE) 50 mcg/actuation nasal spray 1 Si Sprays by Both Nostrils route daily. Indications: Allergic Rhinitis Class: Normal  
 Pharmacy: [address] [place] [address] #: [phone] Route: Both Nostrils We Performed the Following LIPID PANEL [39908 CPT(R)] METABOLIC PANEL, COMPREHENSIVE [42990 CPT(R)] MICROALBUMIN, UR, RAND W/ MICROALB/CREAT RATIO [id number] CPT(R)] URINALYSIS W/ RFLX MICROSCOPIC [46710 CPT(R)] Patient Instructions Seasonal Allergies: Care Instructions Your Care Instructions Allergies occur when your body's defense system (immune system) overreacts to certain substances. The immune system treats a harmless substance as if it were a harmful germ or virus. Many things can cause this to happen. Examples include pollens, medicine, food, dust, animal dander, and mold. Your allergies are seasonal if you have symptoms just at certain times of the year. In that case, you are probably allergic to pollens from certain trees, grasses, or weeds. Allergies can be mild or severe. Over-the-counter allergy medicine may help with some symptoms. Read and follow all instructions on the label. Managing your allergies is an important part of staying healthy. Your doctor may suggest that you have tests to help find the cause of your allergies.  When you know what things trigger your symptoms, you can avoid them. This can prevent allergy symptoms and other health problems. In some cases, immunotherapy might help. For this treatment, you get shots or use pills that have a small amount of certain allergens in them. Your body \"gets used to\" the allergen, so you react less to it over time. This kind of treatment may help prevent or reduce some allergy symptoms. Follow-up care is a key part of your treatment and safety. Be sure to make and go to all appointments, and call your doctor if you are having problems. It's also a good idea to know your test results and keep a list of the medicines you take. How can you care for yourself at home? · Be safe with medicines. Take your medicines exactly as prescribed. Call your doctor if you think you are having a problem with your medicine. · During your allergy season, keep windows closed. If you need to use air-conditioning, change or clean all filters every month. Take a shower and change your clothes after you have been outside. · Stay inside when pollen counts are high. Vacuum once or twice a week. Use a vacuum  with a HEPA filter or a double-thickness filter. When should you call for help? Give an epinephrine shot if: 
? · You think you are having a severe allergic reaction. ? After giving an epinephrine shot, call 911, even if you feel better. ?Call 911 if: 
? · You have symptoms of a severe allergic reaction. These may include: 
¨ Sudden raised, red areas (hives) all over your body. ¨ Swelling of the throat, mouth, lips, or tongue. ¨ Trouble breathing. ¨ Passing out (losing consciousness). Or you may feel very lightheaded or suddenly feel weak, confused, or restless. ? · You have been given an epinephrine shot, even if you feel better. ?Call your doctor now or seek immediate medical care if: 
? · You have symptoms of an allergic reaction, such as: ¨ A rash or hives (raised, red areas on the skin). ¨ Itching. ¨ Swelling. ¨ Belly pain, nausea, or vomiting. ? Watch closely for changes in your health, and be sure to contact your doctor if: 
? · You do not get better as expected. Where can you learn more? Go to http://luigi-atif.info/. Enter J912 in the search box to learn more about \"Seasonal Allergies: Care Instructions. \" Current as of: September 29, 2016 Content Version: 11.4 © 5895-6596 [employer]. Care instructions adapted under license by [employer] (which disclaims liability or warranty for this information). If you have questions about a medical condition or this instruction, always ask your healthcare professional. [address] any warranty or liability for your use of this information. Advise patient to start taking Over The Counter allergy medication (Allegra, Zyrtec, Claritin, Xyzal or Alavert) daily. If you have itchy, watery eyes you can try OTC Zaditor or Zyrtec Allergy Eye drops. If you have a stuffy nose, please try OTC Flonase, Flonase Sensimist, Rhinocort, or Nasacort AQ 2 squirts up each nostril once a day (adults) or 1 squirt up each nostril once a day (children). Use allergy free, dye free, fragrance free products on your body and hair. After being outdoors, brush hair vigorously, wash face and arms, rinse nostrils with a nasal saline spray and consider changing your clothing. Dust furniture frequently and wear a mask while doing it. Vacuum floors weekly. Remove stuffed animals or extra pillows from the bed. Clean bedding in hot water weekly. Sometimes allergies can be so severe that 2 nasal sprays and 2 pills may be needed to control symptoms. Advised protocol for clearing congestion:  Increase fluid intake, especially water to thin mucous and boost the immune system. Avoid sugar and dairy while congested since they thicken mucous.   Get plenty of rest! Gargle 3 times daily and as needed in Listerine or warm salt water vinegar solutions (1 tsp salt, 1 tsp vinegar in 1 cup lukewarm water.)  Use OTC nasal saline spray up each nostril twice daily. Use humidifier at bedtime. Use OTC Mucinex 600 mg twice daily to loosen mucous. Use OTC Tylenol Arthritis or Ibuprofen up to 800 mg up to 3 times daily as needed for pain, fever or headaches. Avoid decongestants and Ibuprofen if you have high blood pressure! If mucous is consistently discolored yellow or green throughout the day for more than a week, call the doctor for an evaluation. Starting a Weight Loss Plan: Care Instructions Your Care Instructions If you are thinking about losing weight, it can be hard to know where to start. Your doctor can help you set up a weight loss plan that best meets your needs. You may want to take a class on nutrition or exercise, or join a weight loss support group. If you have questions about how to make changes to your eating or exercise habits, ask your doctor about seeing a registered dietitian or an exercise specialist. 
It can be a big challenge to lose weight. But you do not have to make huge changes at once. Make small changes, and stick with them. When those changes become habit, add a few more changes. If you do not think you are ready to make changes right now, try to pick a date in the future. Make an appointment to see your doctor to discuss whether the time is right for you to start a plan. Follow-up care is a key part of your treatment and safety. Be sure to make and go to all appointments, and call your doctor if you are having problems. It's also a good idea to know your test results and keep a list of the medicines you take. How can you care for yourself at home? · Set realistic goals. Many people expect to lose much more weight than is likely. A weight loss of 5% to 10% of your body weight may be enough to improve your health. · Get family and friends involved to provide support. Talk to them about why you are trying to lose weight, and ask them to help. They can help by participating in exercise and having meals with you, even if they may be eating something different. · Find what works best for you. If you do not have time or do not like to cook, a program that offers meal replacement bars or shakes may be better for you. Or if you like to prepare meals, finding a plan that includes daily menus and recipes may be best. 
· Ask your doctor about other health professionals who can help you achieve your weight loss goals. ¨ A dietitian can help you make healthy changes in your diet. ¨ An exercise specialist or  can help you develop a safe and effective exercise program. 
¨ A counselor or psychiatrist can help you cope with issues such as depression, anxiety, or family problems that can make it hard to focus on weight loss. · Consider joining a support group for people who are trying to lose weight. Your doctor can suggest groups in your area. Where can you learn more? Go to http://luigi-atif.info/. Enter X289 in the search box to learn more about \"Starting a Weight Loss Plan: Care Instructions. \" Current as of: October 13, 2016 Content Version: 11.4 © 2408-4631 [employer], [employer]. Care instructions adapted under license by [employer] (which disclaims liability or warranty for this information). If you have questions about a medical condition or this instruction, always ask your healthcare professional. [place] [address] any warranty or liability for your use of this information. Leg and Ankle Edema: Care Instructions Your Care Instructions Swelling in the legs, ankles, and feet is called edema. It is common after you sit or stand for a while.  Long plane flights or car rides often cause swelling in the legs and feet. You may also have swelling if you have to stand for long periods of time at your job. Problems with the veins in the legs (varicose veins) and changes in hormones can also cause swelling. Sometimes the swelling in the ankles and feet is caused by a more serious problem, such as heart failure, infection, blood clots, or liver or kidney disease. Follow-up care is a key part of your treatment and safety. Be sure to make and go to all appointments, and call your doctor if you are having problems. It's also a good idea to know your test results and keep a list of the medicines you take. How can you care for yourself at home? · If your doctor gave you medicine, take it as prescribed. Call your doctor if you think you are having a problem with your medicine. · Whenever you are resting, raise your legs up. Try to keep the swollen area higher than the level of your heart. · Take breaks from standing or sitting in one position. ¨ Walk around to increase the blood flow in your lower legs. ¨ Move your feet and ankles often while you stand, or tighten and relax your leg muscles. · Wear support stockings. Put them on in the morning, before swelling gets worse. · Eat a balanced diet. Lose weight if you need to. · Limit the amount of salt (sodium) in your diet. Salt holds fluid in the body and may increase swelling. When should you call for help? Call 911 anytime you think you may need emergency care. For example, call if: 
? · You have symptoms of a blood clot in your lung (called a pulmonary embolism). These may include: 
¨ Sudden chest pain. ¨ Trouble breathing. ¨ Coughing up blood. ?Call your doctor now or seek immediate medical care if: 
? · You have signs of a blood clot, such as: 
¨ Pain in your calf, back of the knee, thigh, or groin. ¨ Redness and swelling in your leg or groin. ? · You have symptoms of infection, such as: 
¨ Increased pain, swelling, warmth, or redness. ¨ Red streaks or pus. ¨ A fever. ? Watch closely for changes in your health, and be sure to contact your doctor if: 
? · Your swelling is getting worse. ? · You have new or worsening pain in your legs. ? · You do not get better as expected. Where can you learn more? Go to http://luigi-atif.info/. Enter W049 in the search box to learn more about \"Leg and Ankle Edema: Care Instructions. \" Current as of: March 20, 2017 Content Version: 11.4 © 9672-9299 [employer]. Care instructions adapted under license by [employer] (which disclaims liability or warranty for this information). If you have questions about a medical condition or this instruction, always ask your healthcare professional. [address] any warranty or liability for your use of this information. Introducing [facility]! [facility] introduces Equidate patient portal. Now you can access parts of your medical record, email your doctor's office, and request medication refills online. 1. In your internet browser, go to [email or web address] 2. Click on the First Time User? Click Here link in the Sign In box. You will see the New Member Sign Up page. 3. Enter your Equidate Access Code exactly as it appears below. You will not need to use this code after youve completed the sign-up process. If you do not sign up before the expiration date, you must request a new code. · Equidate Access Code: [email or web address] Expires: 7/4/2018  9:54 AM 
 
4. Enter the last four digits of your Social Security Number (xxxx) and Date of Birth (mm/dd/yyyy) as indicated and click Submit. You will be taken to the next sign-up page. 5. Create a Equidate ID. This will be your Equidate login ID and cannot be changed, so think of one that is secure and easy to remember. 6. Create a I.Systemst password. You can change your password at any time. 7. Enter your Password Reset Question and Answer. This can be used at a later time if you forget your password. 8. Enter your e-mail address. You will receive e-mail notification when new information is available in [address] 9. Click Sign Up. You can now view and download portions of your medical record. 10. Click the Download Summary menu link to download a portable copy of your medical information. If you have questions, please visit the Frequently Asked Questions section of the [employer] website. Remember, Cov[employer] is NOT to be used for urgent needs. For medical emergencies, dial 911. Now available from your iPhone and Android! Please provide this summary of care documentation to your next provider. Your primary care clinician is listed as Josh Kim. If you have any questions after today's visit, please call [phone].

## 2018-05-01 NOTE — PROGRESS NOTES
Huong Lima  Identified pt with two pt identifiers(name and ). Chief Complaint   Patient presents with    Shoulder Pain     right shoulder    Arm Pain     right arm    Knee Pain     both knees    Foot Swelling     both feet    Leg Swelling     both legs    Arm swelling     both arms    Foot Pain     \"burning on the bottom and hurting on the side. \"; both feet    Hand Injury     \"fingers have turned dark. \"       1. Have you been to the ER, urgent care clinic since your last visit? Hospitalized since your last visit? No    2. Have you seen or consulted any other health care providers outside of the [address] [place] [address] since your last visit? Include any pap smears or colon screening. NO      Medication reconciliation up to date and corrected with patient at this time. Today's provider has been notified of reason for visit, vitals and flowsheets obtained on patients. Reviewed record in preparation for visit, huddled with provider and have obtained necessary documentation. There are no preventive care reminders to display for this patient.     Wt Readings from Last 3 Encounters:   18 290 lb (131.5 kg)   18 290 lb (131.5 kg)   18 264 lb (119.7 kg)     Temp Readings from Last 3 Encounters:   18 98 °F (36.7 °C) (Oral)   18 97.9 °F (36.6 °C)   17 98.4 °F (36.9 °C) (Oral)     BP Readings from Last 3 Encounters:   18 128/73   18 135/63   18 113/67     Pulse Readings from Last 3 Encounters:   18 72   18 80   18 80     Vitals:    18 1024   BP: 103/72   Pulse: 82   Resp: 16   Temp: 98.4 °F (36.9 °C)   TempSrc: Oral   SpO2: 97%   Weight: 290 lb (131.5 kg)   Height: 5' 1\" (1.549 m)   PainSc:   5   PainLoc: Generalized         Learning Assessment:  :     Learning Assessment 2015   PRIMARY LEARNER Patient Patient   HIGHEST LEVEL OF EDUCATION - PRIMARY LEARNER  SOME COLLEGE SOME COLLEGE   BARRIERS PRIMARY LEARNER OTHER NONE   CO-LEARNER CAREGIVER - No   CO-LEARNER NAME - n/a   PRIMARY LANGUAGE ENGLISH ENGLISH   LEARNER PREFERENCE PRIMARY LISTENING LISTENING     VIDEOS -   ANSWERED BY patient patient   RELATIONSHIP SELF SELF       Depression Screening:  :     PHQ over the last two weeks 8/3/2017   Little interest or pleasure in doing things Not at all   Feeling down, depressed or hopeless Not at all   Total Score PHQ 2 0       Fall Risk Assessment:  :     Fall Risk Assessment, last 12 mths 1/9/2018   Able to walk? No       Abuse Screening:  :     Abuse Screening Questionnaire 8/3/2017   Do you ever feel afraid of your partner? N   Are you in a relationship with someone who physically or mentally threatens you? N   Is it safe for you to go home?  Y       ADL Screening:  :     ADL Assessment 8/3/2017   Feeding yourself No Help Needed   Getting from bed to chair Help Needed   Getting dressed Help Needed   Bathing or showering Help Needed   Walk across the room (includes cane/walker) Help Needed   Using the telphone No Help Needed   Taking your medications Help Needed   Preparing meals Help Needed   Managing money (expenses/bills) No Help Needed   Moderately strenuous housework (laundry) Help Needed   Shopping for personal items (toiletries/medicines) Help Needed   Shopping for groceries Help Needed   Driving Help Needed   Climbing a flight of stairs Help Needed   Getting to places beyond walking distances Help Needed

## 2018-05-02 NOTE — TELEPHONE ENCOUNTER
Pt seen in our office by me today. No additional forms brought from [facility] [place] for Mass[facility]. Please confirm that they have all they need to care for her Bilateral Foot swelling. Thanks.

## 2018-06-21 ENCOUNTER — OFFICE VISIT (OUTPATIENT)
Dept: FAMILY MEDICINE CLINIC | Age: 78
End: 2018-06-21

## 2018-06-21 VITALS
OXYGEN SATURATION: 98 % | SYSTOLIC BLOOD PRESSURE: 139 MMHG | TEMPERATURE: 97.8 F | DIASTOLIC BLOOD PRESSURE: 64 MMHG | HEART RATE: 80 BPM | RESPIRATION RATE: 18 BRPM | HEIGHT: 61 IN

## 2018-06-21 DIAGNOSIS — I89.0 LYMPHEDEMA: ICD-10-CM

## 2018-06-21 DIAGNOSIS — I87.8 LOWER EXTREMITY VENOUS STASIS: Primary | ICD-10-CM

## 2018-06-21 DIAGNOSIS — L81.9 HYPERPIGMENTATION OF SKIN: ICD-10-CM

## 2018-06-21 NOTE — MR AVS SNAPSHOT
[facility] 
 
 
 [address] 
[address] [place] [address] 
[phone] Patient: Sun Mendoza MRN:  [id number] Visit Information Date & Time Provider Department Dept. Phone Encounter #  
 2018  3:40 PM Laron Carreon NP [facility] [phone] [id number] Follow-up Instructions Return if symptoms worsen or fail to improve. Your Appointments 2018 10:00 AM  
ROUTINE CARE with DO Derrick Guzmán 74 (JASMIN De Baca) Appt Note: 3 month for swelling, neck pain, and arm pain [address] [facility]  
  
   
 [address]  
  
    
 2018 10:30 AM  
ESTABLISHED PATIENT with Taylor Lees MD  
[facility]) Appt Note: 6 MO. F/U  
 [address] [facility]  
[phone] [address] [facility]  
  
    
 2018  2:20 PM  
Follow Up with Fabrice [place] MD  
[facility] at [address] [facility] Appt Note: 6 month f/u memory loss NN 18; f/u memory loss r/s from 2018 [address] [facility] [address]  
[phone]  
  
   
 [address]  [address] Upcoming Health Maintenance Date Due Influenza Age 5 to Adult 2018 MEDICARE YEARLY EXAM 2018 GLAUCOMA SCREENING Q2Y 2020 DTaP/Tdap/Td series (2 - Td) 2026 Allergies as of 2018  Review Complete On: 2018 By: Laron Carreon NP Severity Noted Reaction Type Reactions Aristocort [Triamcinolone] High 1970    Hives Aristocort shot Codeine High 08/10/2009    Itching Lasix [Furosemide] High 08/10/2009    Itching Bupropion  07/25/2011    Other (comments) Severe headache Darvocet A500 [Propoxyphene N-acetaminophen]  09/30/2010    Other (comments)  
 headache Influenza Virus Vaccine Whole  09/30/2009    Other (comments)  
 pharyngitis Mevacor [Lovastatin]  08/10/2009    Other (comments)  
 myalgias Percocet [Oxycodone-acetaminophen]  07/15/2011    Itching Worse headache Rhinocort  08/10/2009    Other (comments)  
 nosebleed Simvastatin  08/10/2009    Other (comments)  
 myalgias Current Immunizations  Reviewed on 4/5/2018 Name Date Influenza Vaccine Split  Deferred (Contraindication) TB Skin Test (PPD) Intradermal 8/5/2013 TD Vaccine 3/28/2008 ZZZ-RETIRED (DO NOT USE) Pneumococcal Vaccine (Unspecified Type)  Deferred (Patient Refused) Not reviewed this visit You Were Diagnosed With   
  
 Codes Comments Lower extremity venous stasis    -  Primary ICD-10-CM: I87.8 ICD-9-CM: 459.81 Lymphedema     ICD-10-CM: I89.0 ICD-9-CM: 488.1 Hyperpigmentation of skin     ICD-10-CM: L81.9 ICD-9-CM: 709.00 Vitals BP Pulse Temp Resp Height(growth percentile) SpO2  
 139/64 (BP 1 Location: Right arm, BP Patient Position: Sitting) 80 97.8 °F (36.6 °C) (Oral) 18 5' 1\" (1.549 m) 98% OB Status Smoking Status Postmenopausal Former Smoker Preferred Pharmacy Pharmacy Name Phone [address] [address] [phone] Your Updated Medication List  
  
   
This list is accurate as of 6/21/18  4:43 PM.  Always use your most recent med list.  
  
  
  
  
 acetaminophen 325 mg tablet Commonly known as:  TYLENOL Take  by mouth every four (4) hours as needed for Pain. amLODIPine 2.5 mg tablet Commonly known as:  Roger Radha Take 1 tablet by mouth daily. aspirin 81 mg chewable tablet Take 1 Tab by mouth daily. Indications: prevention of cerebrovascular accident bumetanide 1 mg tablet Commonly known as:  Shirlean Alberto TAKE 1 TABLET BY MOUTH EVERY DAY  
  
 fluticasone 50 mcg/actuation nasal spray Commonly known as:  Francella Lacks 2 Sprays by Both Nostrils route daily. Indications: Allergic Rhinitis  
  
 lisinopril 5 mg tablet Commonly known as:  Kelly Flight Take 1 tablet by mouth daily. loratadine 10 mg tablet Commonly known as:  Gonzalez Canavan Take 1 Tab by mouth daily. mirtazapine 15 mg disintegrating tablet Commonly known as:  REMERON SOL-TAB  
  
 montelukast 10 mg tablet Commonly known as:  SINGULAIR  
  
 nicotinic acid 500 mg tablet Commonly known as:  NIACIN Take 500 mg by mouth two (2) times daily (with meals). Indications: hypertriglyceridemia OTHER Please dispense an Ultra light Wheelchair DX.severe osteoarthritis, fibromyalgia, Bell's Palsy, DJD,muscle weakness, lumbar stenosis, knee and shoulder pain  
  
 pantoprazole 40 mg tablet Commonly known as:  PROTONIX  
  
 potassium chloride SR 10 mEq tablet Commonly known as:  KLOR-CON 10  
  
 sertraline 50 mg tablet Commonly known as:  ZOLOFT We Performed the Following REFERRAL TO LYMPHEDEMA CLINIC [SET876 Custom] Comments:  
 Please evaluate and treat for chronic LE lymphedema Follow-up Instructions Return if symptoms worsen or fail to improve. Referral Information Referral ID Referred By Referred To  
  
 [id number] Anjum RCOHA Not Available Visits Status Start Date End Date 1 New Request 6/21/18 6/21/19 If your referral has a status of pending review or denied, additional information will be sent to support the outcome of this decision. Patient Instructions 1) Darkening of skin on hands and lower legs. Ask Dr. Slava Brown his opinion on the cause. Diabetes can cause this, but your last A1c was 6.2% in Dec 2017 Hands are warm to touch, which would indicate good circulation Consider referral to vascular specialist for further assessment, pending Dr. Sunita Chino opinion. 2) Lower leg swelling - Referral to Lymphedema clinic Please make an appt with Lymphedema clinic to get therapy for lower leg swelling and pain relief caused by swelling Lymphedema: Care Instructions Your Care Instructions Lymphedema is fluid that builds up in the arms or legs. It is often caused by surgery to remove lymph nodes during cancer treatment, especially breast cancer surgery, which can cause fluid to build up in the arm. It can happen after radiation treatment to an area that involves lymph nodes. It also can be caused by a fractured bone or surgery to fix a fracture. And some medicines also can cause lymphedema. Some people get it for unknown reasons. Normally, lymph nodes trap bacteria and other substances as fluid flows through them. Then, the white cells in the body's defense, or immune, system can destroy the substances. But if there are few or no lymph nodes-or if the lymph system in an arm or leg has been damaged-fluid can build up in the affected arm or leg. You can take simple steps at home to help treat or prevent fluid buildup. Treatment may include raising the arm or leg to let gravity drain the fluid. You also can wear compression stockings or sleeves. Follow-up care is a key part of your treatment and safety. Be sure to make and go to all appointments, and call your doctor if you are having problems. It's also a good idea to know your test results and keep a list of the medicines you take. How can you care for yourself at home? · Wear a compression stocking or sleeve as your doctor suggests. It can help keep fluid from pooling in an arm or leg. Wear it during air travel. · Prop up the swollen arm or leg on a pillow anytime you sit or lie down. Try to keep it above the level of your heart. This will help reduce swelling. · Avoid crossing your legs if your legs are swollen. · Get some exercise on most days of the week. Increase the intensity of exercise slowly. Water aerobics can help reduce swelling by helping fluid move around. Wear your compression stocking or sleeve during exercise, but not during water exercise. · See a physical therapist. He or she can teach you how to do self-massage to help fluid move around. You also can learn what activities would be best for you. · Keep your feet clean and wear clean socks or stockings every day. Check your feet often for signs of infection, such as redness or heat. Do not walk barefoot. · If you have had lymph nodes removed from under your arm: ¨ Do not have blood drawn from the arm on the side of the lymph node surgery. ¨ Do not allow a blood pressure cuff to be placed on that arm. If you are in the hospital, make sure your nurse and other hospital staff know of your condition. ¨ Wear gloves when gardening or doing other activities that may lead to cuts on your fingers or hands. · If you have had lymph nodes removed from your groin: ¨ Bathe your feet daily in lukewarm, not hot, water. Use a mild soap that has a moisturizer, or use a moisturizer separately. ¨ Check your feet for blisters or cuts. ¨ Wear comfortable and supportive shoes that fit properly. ¨ Wear the correct size of panty hose and stockings. Avoid garters or knee-high or thigh-high stockings. · Ask your doctor how to treat any cuts, scratches, insect bites, or other injuries that may occur. · Use sunscreen and insect repellent when outdoors to protect your skin from sunburn and insect bites. · Wear medical alert jewelry that says you have lymphedema. You can buy these at most [employer] and on the Internet. When should you call for help? Call your doctor now or seek immediate medical care if: 
? · You have signs of infection, such as: 
¨ Increased pain, swelling, warmth, or redness. ¨ Red streaks leading from the area. ¨ Pus draining from the area. ¨ A fever. ? Watch closely for changes in your health, and be sure to contact your doctor if: 
? · You have new or worse symptoms from lymphedema. ? · You do not get better as expected. Where can you learn more? Go to http://luigi-atif.info/. Enter V398 in the search box to learn more about \"Lymphedema: Care Instructions. \" Current as of: May 12, 2017 Content Version: 11.4 © 0506-9136 [employer]. Care instructions adapted under license by [employer] (which disclaims liability or warranty for this information). If you have questions about a medical condition or this instruction, always ask your healthcare professional. [address] any warranty or liability for your use of this information. Introducing [facility] [place] [facility]! [employer] introduces [employer] patient portal. Now you can access parts of your medical record, email your doctor's office, and request medication refills online. 1. In your internet browser, go to [email or web address] 2. Click on the First Time User? Click Here link in the Sign In box. You will see the New Member Sign Up page. 3. Enter your [employer] Access Code exactly as it appears below. You will not need to use this code after youve completed the sign-up process. If you do not sign up before the expiration date, you must request a new code. · [employer] Access Code: [email or web address] Expires: 7/4/2018  9:54 AM 
 
4. Enter the last four digits of your Social Security Number (xxxx) and Date of Birth (mm/dd/yyyy) as indicated and click Submit. You will be taken to the next sign-up page. 5. Create a Vir[employer] ID. This will be your [employer] login ID and cannot be changed, so think of one that is secure and easy to remember. 6. Create a [employer] password. You can change your password at any time. 7. Enter your Password Reset Question and Answer. This can be used at a later time if you forget your password. 8. Enter your e-mail address. You will receive e-mail notification when new information is available in [address] 9. Click Sign Up. You can now view and download portions of your medical record. 10. Click the Download Summary menu link to download a portable copy of your medical information. If you have questions, please visit the Frequently Asked Questions section of the [employer] website. Remember, [employer] is NOT to be used for urgent needs. For medical emergencies, dial 911. Now available from your iPhone and Android! Please provide this summary of care documentation to your next provider. Your primary care clinician is listed as Allyson Sales. If you have any questions after today's visit, please call [phone].

## 2018-06-21 NOTE — PROGRESS NOTES
Chief Complaint   Patient presents with    Other     bilateral fingers and feet getting dark started 1 week ago       Jada Read  Identified pt with two pt identifiers(name and ). Chief Complaint   Patient presents with    Other     bilateral fingers and feet getting dark started 1 week ago       1. Have you been to the ER, urgent care clinic since your last visit? NHospitalized since your last visit? No    2. Have you seen or consulted any other health care providers outside of the [address] [place] [address] since your last visit? N Include any pap smears or colon screening. No    Today's provider has been notified of reason for visit, vitals and flowsheets obtained on patients. Patient received paperwork for advance directive during previous visit but has not completed at this time     Reviewed record In preparation for visit, huddled with provider and have obtained necessary documentation      There are no preventive care reminders to display for this patient.     Wt Readings from Last 3 Encounters:   18 290 lb (131.5 kg)   18 290 lb (131.5 kg)   18 264 lb (119.7 kg)     Temp Readings from Last 3 Encounters:   18 97.8 °F (36.6 °C) (Oral)   18 98.4 °F (36.9 °C) (Oral)   18 98 °F (36.7 °C) (Oral)     BP Readings from Last 3 Encounters:   18 139/64   18 103/72   18 128/73     Pulse Readings from Last 3 Encounters:   18 80   18 82   18 72     Vitals:    18 1607   BP: 139/64   Pulse: 80   Resp: 18   Temp: 97.8 °F (36.6 °C)   TempSrc: Oral   SpO2: 98%   Height: 5' 1\" (1.549 m)   PainSc:   7         Learning Assessment:  :     Learning Assessment 2015   PRIMARY LEARNER Patient Patient   HIGHEST LEVEL OF EDUCATION - PRIMARY LEARNER  SOME COLLEGE SOME COLLEGE   BARRIERS PRIMARY LEARNER OTHER NONE   CO-LEARNER CAREGIVER - No   CO-LEARNER NAME - n/a   PRIMARY LANGUAGE ENGLISH ENGLISH   LEARNER PREFERENCE PRIMARY LISTENING LISTENING     VIDEOS -   ANSWERED BY patient patient   RELATIONSHIP SELF SELF       Depression Screening:  :     PHQ over the last two weeks 8/3/2017   Little interest or pleasure in doing things Not at all   Feeling down, depressed or hopeless Not at all   Total Score PHQ 2 0       Fall Risk Assessment:  :     Fall Risk Assessment, last 12 mths 6/21/2018   Able to walk? No       Abuse Screening:  :     Abuse Screening Questionnaire 8/3/2017   Do you ever feel afraid of your partner? N   Are you in a relationship with someone who physically or mentally threatens you? N   Is it safe for you to go home? Y       ADL Screening:  :     ADL Assessment 8/3/2017   Feeding yourself No Help Needed   Getting from bed to chair Help Needed   Getting dressed Help Needed   Bathing or showering Help Needed   Walk across the room (includes cane/walker) Help Needed   Using the telphone No Help Needed   Taking your medications Help Needed   Preparing meals Help Needed   Managing money (expenses/bills) No Help Needed   Moderately strenuous housework (laundry) Help Needed   Shopping for personal items (toiletries/medicines) Help Needed   Shopping for groceries Help Needed   Driving Help Needed   Climbing a flight of stairs Help Needed   Getting to places beyond walking distances Help Needed                 Medication reconciliation up to date and corrected with patient at this time.

## 2018-06-21 NOTE — PATIENT INSTRUCTIONS
1) Darkening of skin on hands and lower legs. Ask Dr. Valery Jara his opinion on the cause. Diabetes can cause this, but your last A1c was 6.2% in Dec 2017  Hands are warm to touch, which would indicate good circulation  Consider referral to vascular specialist for further assessment, pending Dr. Jose Valadez opinion. 2) Lower leg swelling - Referral to Lymphedema clinic   Please make an appt with Lymphedema clinic to get therapy for lower leg swelling and pain relief caused by swelling         Lymphedema: Care Instructions  Your Care Instructions    Lymphedema is fluid that builds up in the arms or legs. It is often caused by surgery to remove lymph nodes during cancer treatment, especially breast cancer surgery, which can cause fluid to build up in the arm. It can happen after radiation treatment to an area that involves lymph nodes. It also can be caused by a fractured bone or surgery to fix a fracture. And some medicines also can cause lymphedema. Some people get it for unknown reasons. Normally, lymph nodes trap bacteria and other substances as fluid flows through them. Then, the white cells in the body's defense, or immune, system can destroy the substances. But if there are few or no lymph nodes-or if the lymph system in an arm or leg has been damaged-fluid can build up in the affected arm or leg. You can take simple steps at home to help treat or prevent fluid buildup. Treatment may include raising the arm or leg to let gravity drain the fluid. You also can wear compression stockings or sleeves. Follow-up care is a key part of your treatment and safety. Be sure to make and go to all appointments, and call your doctor if you are having problems. It's also a good idea to know your test results and keep a list of the medicines you take. How can you care for yourself at home? · Wear a compression stocking or sleeve as your doctor suggests. It can help keep fluid from pooling in an arm or leg.  Wear it during air travel. · Prop up the swollen arm or leg on a pillow anytime you sit or lie down. Try to keep it above the level of your heart. This will help reduce swelling. · Avoid crossing your legs if your legs are swollen. · Get some exercise on most days of the week. Increase the intensity of exercise slowly. Water aerobics can help reduce swelling by helping fluid move around. Wear your compression stocking or sleeve during exercise, but not during water exercise. · See a physical therapist. He or she can teach you how to do self-massage to help fluid move around. You also can learn what activities would be best for you. · Keep your feet clean and wear clean socks or stockings every day. Check your feet often for signs of infection, such as redness or heat. Do not walk barefoot. · If you have had lymph nodes removed from under your arm:  ¨ Do not have blood drawn from the arm on the side of the lymph node surgery. ¨ Do not allow a blood pressure cuff to be placed on that arm. If you are in the hospital, make sure your nurse and other hospital staff know of your condition. ¨ Wear gloves when gardening or doing other activities that may lead to cuts on your fingers or hands. · If you have had lymph nodes removed from your groin:  ¨ Bathe your feet daily in lukewarm, not hot, water. Use a mild soap that has a moisturizer, or use a moisturizer separately. ¨ Check your feet for blisters or cuts. ¨ Wear comfortable and supportive shoes that fit properly. ¨ Wear the correct size of panty hose and stockings. Avoid garters or knee-high or thigh-high stockings. · Ask your doctor how to treat any cuts, scratches, insect bites, or other injuries that may occur. · Use sunscreen and insect repellent when outdoors to protect your skin from sunburn and insect bites. · Wear medical alert jewelry that says you have lymphedema. You can buy these at most drugstores and on the Internet.   When should you call for help?  Call your doctor now or seek immediate medical care if:  ? · You have signs of infection, such as:  ¨ Increased pain, swelling, warmth, or redness. ¨ Red streaks leading from the area. ¨ Pus draining from the area. ¨ A fever. ? Watch closely for changes in your health, and be sure to contact your doctor if:  ? · You have new or worse symptoms from lymphedema. ? · You do not get better as expected. Where can you learn more? Go to http://luigi-atif.info/. Enter V398 in the search box to learn more about \"Lymphedema: Care Instructions. \"  Current as of: May 12, 2017  Content Version: 11.4  © 8089-8752 [employer]. Care instructions adapted under license by [employer] (which disclaims liability or warranty for this information). If you have questions about a medical condition or this instruction, always ask your healthcare professional. [address] any warranty or liability for your use of this information.

## 2018-06-21 NOTE — PROGRESS NOTES
S: Ishmael Thornton is a 76 y.o. female who presents for follow up     Assessment/Plan:     1. Lower extremity venous stasis  -nonpitting edema bilat LE, ttp  - Referral lymphedema clinic    2.   Hyperpigmentation of skin  -bilateral hyperpigmentation of hands, skin warm to touch, no edema, erythema or ecchymosis   -bilateral LE nonpitting edema and hyperpigmentation, ttp  -no hyperpigmentation of skin noted on back, axillae or neck areas to indicate acanthosis nigricans  -previously seen by Dr. Harshil Schultz for problem 5/1/18  -advised to seek Dr. Annalisa West opinion at 7/1/18  Appt; consider vascular referral to r/o circulation issue; will monitor         HPI:  Pt w/hx of dementia presents with c/o discolored skin on hands  Lower leg swelling w/discolored skin up to calves   Has appt with Dr. Arnie Hutchison - cardiology - July 11  Pt previously seen by Dr. Harshil Schultz for same concern 45/1/2018 - note reads: \"hyperpigmented from PIP joints to finger tips BL hands all digits due to Insulin Resistance/Acanthosis nigricans vs trauma from rubbing her fingers vs other\"  Advised pt to seek Dr. Annalisa West input,     Social History:  Nutrition: overall healthy diet, water, sweet tea, soda   Physical: wheelchair bound, no exercises classes at facility  [address] [place] [address] living  Social: lives alone - no roommate  Tobacco: none   Alcohol: none    Review of Systems:  - Constitutional Symptoms: no fevers, chills, weight loss  - Cardiovascular: no chest pain or palpitations  - Respiratory: no cough or shortness of breath  - Gastrointestinal: no dysphagia or abdominal pain    PHQ over the last two weeks 8/3/2017   Little interest or pleasure in doing things Not at all   Feeling down, depressed or hopeless Not at all   Total Score PHQ 2 0       I reviewed the following:  Past Medical History:   Diagnosis Date    Abnormal cardiovascular stress test 04/18/08    Dr. Ulysses Martel    Allergy, unspecified not elsewhere classified     Bell's palsy 02/2005    Left. Dr. Chico Youssef CAD (coronary artery disease)     Dr. Saritha Dewitt. Dr. Yolande Macario Chest pain 2002    Dr. Ar Louis Chronic headache 2013    due to depression. Dr. Apurva Garcia.  Chronic pain     LEGS AND FEET; BACK UNDER RIGHT SHOULDER    Congenital eye disorder     Lazy eyes. Dr. Rustam Jorge.  Depression 2011    Dr. Jennifer Dozier (psychologist). Dr. Apurva Garcia. Dr. Dallin Jameson.  DJD (degenerative joint disease)     knees, low back. Dr. Jet Cbaral. Dr. Juarez Escobar. Dr. Mitchell Ruby Gait instability 02/10/09    Dr. Cally Mittal. Dr. Apurva Garcia.  Gallstones     GERD (gastroesophageal reflux disease)     Hiatal hernia 1977    Hypercholesteremia     Hypertension     Ill-defined condition     \"JUST ONE NIGHT I WENT TO STAND UP AND I JUST COULDN'T STAND\"  PT NOT SURE WHY    Leg weakness 12/2009    Dr. Adam Jorge. Dr. Petros Steinberg.  Lower extremity venous stasis     Dr. Laila Ontiveros    Lumbar spinal stenosis 12/2009    L3-5. Dr. Tessa Guzman. PARAPLEGIA SINCE 2012    Lymphedema 12/19/08    Dr. Warren Ortiz then Dr. Ahsan Cifuentes Memory loss 2013    Dr. Patrice Llanes.  Menopause     Migraine     Ocular. Dr. Bert Vargas    NORMA (obstructive sleep apnea) 01/24/07    Dr. Gabriel Blevins. NO CPAP    Osteoporosis 02/10/09    Dr. Kain Herrera    Pancreatitis 03/2011    Post-menopausal bleeding 07/2016    due to Dr. Antonina Alexandre Jr.    Rotator cuff injury     Left. Dr. Mitch Mercedes; chronic bilateral    Seborrheic keratosis 2005    Dr. Joss Rodas 02/05/09    Dr. Reggie Moon [place] Tremor 2013    Dr. Apurva Garcia.  Unspecified vitamin D deficiency 07/2009    Urinary incontinence 2013    Dr. Elnor Primrose Venous insufficiency 1998    R>L. with chronic leg edema.   Dr. Shavonne Villegas Outpatient Prescriptions   Medication Sig Dispense Refill    potassium chloride SR (KLOR-CON 10) 10 mEq tablet       mirtazapine (REMERON SOL-TAB) 15 mg disintegrating tablet       acetaminophen (TYLENOL) 325 mg tablet Take  by mouth every four (4) hours as needed for Pain.  nicotinic acid (NIACIN) 500 mg tablet Take 500 mg by mouth two (2) times daily (with meals). Indications: hypertriglyceridemia      fluticasone (FLONASE) 50 mcg/actuation nasal spray 2 Sprays by Both Nostrils route daily. Indications: Allergic Rhinitis 3 Bottle 1    loratadine (CLARITIN) 10 mg tablet Take 1 Tab by mouth daily. 90 Tab 3    aspirin 81 mg chewable tablet Take 1 Tab by mouth daily. Indications: prevention of cerebrovascular accident 80 Tab 3    OTHER Please dispense an Ultra light Wheelchair DX.severe osteoarthritis, fibromyalgia, Bell's Palsy, DJD,muscle weakness, lumbar stenosis, knee and shoulder pain 1 Device 0    amlodipine (NORVASC) 2.5 mg tablet Take 1 tablet by mouth daily. 30 tablet 11    bumetanide (BUMEX) 1 mg tablet TAKE 1 TABLET BY MOUTH EVERY DAY 30 tablet 11    lisinopril (PRINIVIL, ZESTRIL) 5 mg tablet Take 1 tablet by mouth daily.  30 tablet 11    sertraline (ZOLOFT) 50 mg tablet       pantoprazole (PROTONIX) 40 mg tablet       montelukast (SINGULAIR) 10 mg tablet          Allergies   Allergen Reactions    Aristocort [Triamcinolone] Hives     Aristocort shot    Codeine Itching    Lasix [Furosemide] Itching    Bupropion Other (comments)     Severe headache    Darvocet A500 [Propoxyphene N-Acetaminophen] Other (comments)     headache    Influenza Virus Vaccine Whole Other (comments)     pharyngitis    Mevacor [Lovastatin] Other (comments)     myalgias    Percocet [Oxycodone-Acetaminophen] Itching     Worse headache    Rhinocort Other (comments)     nosebleed    Simvastatin Other (comments)     myalgias        O: VS:   Visit Vitals    /64 (BP 1 Location: Right arm, BP Patient Position: Sitting)    Pulse 80    Temp 97.8 °F (36.6 °C) (Oral)    Resp 18    Ht 5' 1\" (1.549 m)    SpO2 98%       GENERAL: Omar Sanchez is in no acute distress. Non-toxic. Well nourished. Appropriately groomed, in wheelchair  RESP: Breath sounds are symmetrical bilaterally. Unlabored without SOB. Speaking in full sentences. Clear to auscultation bilaterally anteriorly and posteriorly. No wheezes. No rales or rhonchi. CV: normal rate. Regular rhythm. S1, S2 audible. No murmur noted. No rubs, clicks or gallops noted. SKIN: Skin is warm and dry. Bilateral hyperpigmentation of hands, skin warm to touch, no edema, erythema or ecchymosis noted. No hyperpigmentation of skin noted on back, axillae or neck areas  Bilateral LE nonpitting edema and hyperpigmentation, ttp              ___________________________________________________________________  Patient education was done. Advised on nutrition, physical activity, weight management, tobacco, alcohol and safety. Counseling included discussion of diagnosis, differentials, treatment options, prescribed treatment, warning signs and follow up. Medication risks/benefits, interactions and alternatives discussed with patient.      Patient verbalized understanding and agreed to plan of care. Patient was given an after visit summary which included current diagnoses, medications and vital signs. Follow up as directed.

## 2018-07-11 ENCOUNTER — OFFICE VISIT (OUTPATIENT)
Dept: CARDIOLOGY CLINIC | Age: 78
End: 2018-07-11

## 2018-07-11 VITALS
RESPIRATION RATE: 16 BRPM | HEIGHT: 61 IN | DIASTOLIC BLOOD PRESSURE: 82 MMHG | WEIGHT: 290 LBS | SYSTOLIC BLOOD PRESSURE: 148 MMHG | BODY MASS INDEX: 54.75 KG/M2 | OXYGEN SATURATION: 96 % | HEART RATE: 71 BPM

## 2018-07-11 DIAGNOSIS — G30.1 SENILE DEMENTIA OF ALZHEIMER'S TYPE (HCC): Chronic | ICD-10-CM

## 2018-07-11 DIAGNOSIS — I87.8 LOWER EXTREMITY VENOUS STASIS: ICD-10-CM

## 2018-07-11 DIAGNOSIS — F02.80 SENILE DEMENTIA OF ALZHEIMER'S TYPE (HCC): Chronic | ICD-10-CM

## 2018-07-11 DIAGNOSIS — I89.0 LYMPHEDEMA: ICD-10-CM

## 2018-07-11 DIAGNOSIS — I10 ESSENTIAL HYPERTENSION: Primary | ICD-10-CM

## 2018-07-11 DIAGNOSIS — Z99.3 WHEELCHAIR DEPENDENT: ICD-10-CM

## 2018-07-11 DIAGNOSIS — Z78.9 STATIN INTOLERANCE: ICD-10-CM

## 2018-07-11 DIAGNOSIS — M48.061 SPINAL STENOSIS OF LUMBAR REGION, UNSPECIFIED WHETHER NEUROGENIC CLAUDICATION PRESENT: ICD-10-CM

## 2018-07-11 DIAGNOSIS — E66.01 MORBID OBESITY (HCC): Chronic | ICD-10-CM

## 2018-07-11 DIAGNOSIS — I87.2 CHRONIC VENOUS INSUFFICIENCY: ICD-10-CM

## 2018-07-11 DIAGNOSIS — E78.00 HYPERCHOLESTEREMIA: ICD-10-CM

## 2018-07-11 RX ORDER — TRAMADOL HYDROCHLORIDE 50 MG/1
50 TABLET ORAL
COMMUNITY

## 2018-07-11 RX ORDER — BUTALBITAL, ASPIRIN, CAFFEINE AND CODEINE PHOSPHATE 50; 325; 40; 30 MG/1; MG/1; MG/1; MG/1
CAPSULE ORAL
COMMUNITY

## 2018-07-11 RX ORDER — ONDANSETRON 8 MG/1
8 TABLET, ORALLY DISINTEGRATING ORAL
COMMUNITY
Start: 2018-06-07

## 2018-07-11 RX ORDER — GLUCOSAMINE SULFATE 1500 MG
POWDER IN PACKET (EA) ORAL DAILY
COMMUNITY

## 2018-07-11 RX ORDER — ALBUTEROL SULFATE 90 UG/1
2 AEROSOL, METERED RESPIRATORY (INHALATION)
COMMUNITY

## 2018-07-11 NOTE — PROGRESS NOTES
[facility]   [address] [facility], [facility] [address] [facility] [facility] [place] [facility]                                          NEW PATIENT HPI/FOLLOW-UP      NAME:  Kate Smith   :   1940   MRN:   [id number]   PCP:  Wu Arrieta DO           Subjective: The patient is a 76y.o. year old female, wheelchair dependent due to spinal stenosis,OA who returns for a routine follow-up. Since the last visit, patient reports rare, intermittent infrequent non-exertional upper right chest and shoulder pain. Also \"knots\" in legs. Sometimes color changes in fingers/hands. hAD SHORT-LIVED EPISODE OFDenies change in exercise tolerance, edema--chronic(referred to lymphedema clinic by PCP/NP, medication intolerance, palpitations, shortness of breath, PND/orthopnea wheezing, sputum, syncope, dizziness or light headedness. Doing satisfactorily overall. .      Review of Systems  General: Pt denies excessive weight gain or loss. Pt is able to conduct ADL's. Respiratory: Denies shortness of breath, LEIVA, wheezing or stridor. Cardiovascular: +precordial pain, -palpitations, +++edema or PND  Gastrointestinal: Denies poor appetite, indigestion, abdominal pain or blood in stool  Peripheral vascular: Denies claudication, leg cramps  Neuropsychiatric: Denies paresthesias,tingling,numbness,anxiety,depression,fatigue  Musculoskeletal: Denies pain,tenderness, soreness,swelling      Past Medical History:   Diagnosis Date    Abnormal cardiovascular stress test 08    Dr. Lauryn Paz    Allergy, unspecified not elsewhere classified     Bell's palsy 2005    Left. Dr. Lela Salas CAD (coronary artery disease)     Dr. Alejandra Alva. Dr. Chiu Brothguy Chest pain     Dr. Naa Baig Chronic headache 2013    due to depression. Dr. Brandon Bundy.  Chronic pain     LEGS AND FEET; BACK UNDER RIGHT SHOULDER    Congenital eye disorder     Lazy eyes.   Dr. Law Pulse    Dementia Dr. Johnny Morales.  Depression 2011    Dr. Nelli Gooden (psychologist). Dr. Josey Samano. Dr. Sigifredo Trejo.  DJD (degenerative joint disease)     knees, low back. Dr. Vivienne Cushing. Dr. Yancy Leyva. Dr. Alis Maciel Gait instability 02/10/09    Dr. Claritza Gomez. Dr. Josey Samano.  Gallstones     GERD (gastroesophageal reflux disease)     Hiatal hernia 1977    Hypercholesteremia     Hypertension     Ill-defined condition     \"JUST ONE NIGHT I WENT TO STAND UP AND I JUST COULDN'T STAND\"  PT NOT SURE WHY    Leg weakness 12/2009    Dr. Johnny Morales. Dr. Johnny Shannon.  Lower extremity venous stasis     Dr. Camilo Ochoa    Lumbar spinal stenosis 12/2009    L3-5. Dr. Vania Barton. PARAPLEGIA SINCE 2012    Lymphedema 12/19/08    Dr. Morteza Snowden then Dr. Mariela Burnham Memory loss 2013    Dr. Marguerite Stevens.  Menopause     Migraine     Ocular. Dr. Prince Morton    NORMA (obstructive sleep apnea) 01/24/07    Dr. Vanita Maciel. NO CPAP    Osteoporosis 02/10/09    Dr. Caro Edwards    Pancreatitis 03/2011    Post-menopausal bleeding 07/2016    due to Dr. Jennifer Yonug Jr.    Rotator cuff injury     Left. Dr. Belle Hernandes; chronic bilateral    Seborrheic keratosis 2005    Dr. Rossy Pratt 02/05/09    Dr. Sarahy Hobson   [employer] Tremor 2013    Dr. Josey Samano.  Unspecified vitamin D deficiency 07/2009    Urinary incontinence 2013    Dr. Castillo Venous insufficiency 1998    R>L. with chronic leg edema.   Dr. Mark Luna     Patient Active Problem List    Diagnosis Date Noted    Neck pain on right side 04/05/2018    Elevated AST (SGOT) 04/05/2018    Wheelchair dependent 01/09/2018    Allergic rhinitis due to allergen 12/05/2017    Coronary artery disease involving native coronary artery of native heart without angina pectoris 08/03/2017    Statin intolerance 08/03/2017    Abnormal mammogram of left breast 08/03/2017    Esotropia 09/29/2016    Cataract 09/29/2016    Essential hypertension 06/02/2015    Vitamin D deficiency 01/27/2015    Hyperglycemia 01/27/2015    Tremor     Chronic headache     Depression with anxiety 08/13/2013    Senile dementia of Alzheimer's type 08/13/2013    Memory loss 01/01/2013    Hypokalemia 03/29/2011    Morbid obesity ([address] 03/29/2011    Chronic venous insufficiency     Lower extremity venous stasis     Lumbar spinal stenosis 12/01/2009    Leg weakness 12/01/2009    Hypercholesteremia     GERD (gastroesophageal reflux disease)     Osteoporosis 02/10/2009    Gait instability 02/10/2009    Lymphedema 12/19/2008      Past Surgical History:   Procedure Laterality Date    HX HEART CATHETERIZATION  06/2008    Dr. Swapna Sanders. due to abnormal Stress Test    HX MAR AND BSO  09/14/2016    Dr. Carlos Dickson, Max Mchughyer HYSTEROSCOPIC MYOMECTOMY  07/27/2016    D AND C WITH SYMPHION, RESECTION OF ENDOMETRIAL TISSUE. benign. due to postmenopausal bleeding. Dr. Bishop Phoenix.      Allergies   Allergen Reactions    Aristocort [Triamcinolone] Hives     Aristocort shot    Codeine Itching    Lasix [Furosemide] Itching    Bupropion Other (comments)     Severe headache    Darvocet A500 [Propoxyphene N-Acetaminophen] Other (comments)     headache    Influenza Virus Vaccine Whole Other (comments)     pharyngitis    Mevacor [Lovastatin] Other (comments)     myalgias    Percocet [Oxycodone-Acetaminophen] Itching     Worse headache    Rhinocort Other (comments)     nosebleed    Simvastatin Other (comments)     myalgias      Family History   Problem Relation Age of Onset    Hypertension Mother     Arthritis-osteo Mother     Dementia Mother     Cancer Father      liver    Heart Attack Father     Other Father      kidney stones    Stroke Maternal Grandmother     Hypertension Sister     Arthritis-osteo Sister     Diabetes Paternal Aunt     Anesth Problems Neg Hx       Social History Social History    Marital status: SINGLE     Spouse name: N/A    Number of children: N/A    Years of education: N/A     Occupational History    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 Pt lived with smoker dad x 40 yrs    Alcohol use No    Drug use: No    Sexual activity: Yes     Partners: Male     Birth control/ protection: Condom     Other Topics Concern    Not on file     Social History Narrative    Lives in [place] at [facility]       Current Outpatient Prescriptions   Medication Sig    ondansetron (ZOFRAN ODT) 8 mg disintegrating tablet Take 8 mg by mouth every eight (8) hours as needed.  cholecalciferol (VITAMIN D3) 1,000 unit cap Take  by mouth daily.  codeine-butalbital-aspirin-caffeine (BUTALBITAL COMPOUND-CODEINE) 87--40 mg per capsule Take  by mouth every six (6) hours as needed for Pain.  traMADol (ULTRAM) 50 mg tablet Take 50 mg by mouth every eight (8) hours as needed for Pain.  albuterol (VENTOLIN HFA) 90 mcg/actuation inhaler Take 2 Puffs by inhalation every six (6) hours as needed for Wheezing.  sertraline (ZOLOFT) 50 mg tablet     potassium chloride SR (KLOR-CON 10) 10 mEq tablet     pantoprazole (PROTONIX) 40 mg tablet     mirtazapine (REMERON SOL-TAB) 15 mg disintegrating tablet     acetaminophen (TYLENOL) 325 mg tablet Take  by mouth every four (4) hours as needed for Pain.  nicotinic acid (NIACIN) 500 mg tablet Take 500 mg by mouth two (2) times daily (with meals). Indications: hypertriglyceridemia    fluticasone (FLONASE) 50 mcg/actuation nasal spray 2 Sprays by Both Nostrils route daily. Indications: Allergic Rhinitis    montelukast (SINGULAIR) 10 mg tablet     loratadine (CLARITIN) 10 mg tablet Take 1 Tab by mouth daily.     aspirin 81 mg chewable tablet Take 1 Tab by mouth daily. Indications: prevention of cerebrovascular accident    OTHER Please dispense an Ultra light Wheelchair DX.severe osteoarthritis, fibromyalgia, Bell's Palsy, DJD,muscle weakness, lumbar stenosis, knee and shoulder pain    amlodipine (NORVASC) 2.5 mg tablet Take 1 tablet by mouth daily.  bumetanide (BUMEX) 1 mg tablet TAKE 1 TABLET BY MOUTH EVERY DAY    lisinopril (PRINIVIL, ZESTRIL) 5 mg tablet Take 1 tablet by mouth daily. No current facility-administered medications for this visit. I have reviewed the nurses notes, vitals, problem list, allergy list, medical history, family medical, social history and medications. Objective:     Physical Exam:     Vitals:    07/11/18 1029 07/11/18 1047   BP: 132/82 148/82   Pulse: 71    Resp: 16    SpO2: 96%    Weight: 290 lb (131.5 kg)    Height: 5' 1\" (1.549 m)     Body mass index is 54.8 kg/(m^2). General: Well developed,morbidly obese, wheelchair bound in no acute distress. HEENT: No carotid bruits, no JVD, trach is midline. Heart:  Normal S1/S2 negative S3 or S4. Regular, no murmur, gallop or rub.   Respiratory: Clear bilaterally, no wheezing or rales  Abdomen:   Soft, non-tender, bowel sounds are active.   Extremities:  Non-pitting edema for most part,chronic venous insuff changes,? normal cap refill, no cyanosis. Neuro: A&Ox3, speech clear, gait stable. Skin: Skin color is normal. No rashes or lesions. No diaphoresis.   Vascular: 2+ pulses symmetric in all extremities        Data Review:       Cardiographics:    EKG: NSR,LVH,LAE    Cardiology Labs:    Results for orders placed or performed during the hospital encounter of 01/27/18   EKG, 12 LEAD, INITIAL   Result Value Ref Range    Ventricular Rate 72 BPM    Atrial Rate 72 BPM    P-R Interval 192 ms    QRS Duration 112 ms    Q-T Interval 414 ms    QTC Calculation (Bezet) 453 ms    Calculated P Axis 32 degrees    Calculated T Axis 11 degrees Diagnosis       Normal sinus rhythm  Minimal voltage criteria for LVH, may be normal variant  Nonspecific T wave abnormality  When compared with ECG of 14-NOV-2017 10:19,  Nonspecific T wave abnormality, worse in Lateral leads  Confirmed by Suzi Pendleton ([id number]) on 1/28/2018 5:05:05 PM     Results for orders placed or performed in visit on 11/30/10   AMB POC EKG ROUTINE W/ 12 LEADS, INTER & REP    Impression    Normal sinus rhythm at 77 bpm.  Will fax to Dr. Radha Silveira. EKG was performed standing. Not able to get pt on  exam table. Lab Results   Component Value Date/Time    Cholesterol, total 244 (H) 12/05/2017 12:21 PM    HDL Cholesterol 51 12/05/2017 12:21 PM    LDL, calculated 153 (H) 12/05/2017 12:21 PM    Triglyceride 199 (H) 12/05/2017 12:21 PM    CHOL/HDL Ratio 4.6 08/14/2013 06:00 AM       Lab Results   Component Value Date/Time    Sodium 140 01/28/2018 12:20 AM    Potassium 3.1 (L) 01/28/2018 12:20 AM    Chloride 102 01/28/2018 12:20 AM    CO2 30 01/28/2018 12:20 AM    Anion gap 8 01/28/2018 12:20 AM    Glucose 193 (H) 01/28/2018 12:20 AM    Glucose 78 08/14/2013 06:00 AM    BUN 14 01/28/2018 12:20 AM    Creatinine 0.96 01/28/2018 12:20 AM    BUN/Creatinine ratio 15 01/28/2018 12:20 AM    GFR est AA >60 01/28/2018 12:20 AM    GFR est non-AA 56 (L) 01/28/2018 12:20 AM    Calcium 8.5 01/28/2018 12:20 AM    Bilirubin, total 0.4 01/28/2018 12:20 AM    AST (SGOT) 107 (H) 01/28/2018 12:20 AM    Alk. phosphatase 110 01/28/2018 12:20 AM    Protein, total 6.9 01/28/2018 12:20 AM    Albumin 3.1 (L) 01/28/2018 12:20 AM    Globulin 3.8 01/28/2018 12:20 AM    A-G Ratio 0.8 (L) 01/28/2018 12:20 AM    ALT (SGPT) 42 01/28/2018 12:20 AM          Assessment:       ICD-10-CM ICD-9-CM    1. Essential hypertension I10 401.9 AMB POC EKG ROUTINE W/ 12 LEADS, INTER & REP      2D ECHO COMPLETE ADULT (TTE) W OR WO CONTR   2. Hypercholesteremia E78.00 272.0    3.  Senile dementia of Alzheimer's type G30.1 331.0 F02.80 294.10    4. Morbid obesity ([facility] [address] E66.01 278.01    5. Wheelchair dependent Z99.3 V46.3    6. Statin intolerance Z78.9 995.27    7. Lower extremity venous stasis I87.8 459.81 2D ECHO COMPLETE ADULT (TTE) W OR WO CONTR   8. Lymphedema I89.0 457.1 2D ECHO COMPLETE ADULT (TTE) W OR WO CONTR   9. Chronic venous insufficiency I87.2 459.81    10. Spinal stenosis of lumbar region, unspecified whether neurogenic claudication present M48.061 724.02          Discussion: Patient presents at this time stable from a cardiac perspective. Has hx of chronic leg swelling combination venous and lymph vessel deficiency. Has appt via PCP/NP for evaluation in Lymphedema clinic [facility]. Advised to keep. Don't see echo results in chart since inception of [address] [place] [address] record keeping in 2010. Will order to assess right heart structures and presence of PHTN. Has NORMA appearance but states w/u in past failed to confirm. Is wheelchair bound and denies any cardiac complaints. Pleased with present cardiac status. Plan: 1. Continue same meds. Lipid profile and labs followed by PCP. 2.Encouraged to exercise to tolerance, lose weight and follow low fat, low cholesterol, low sodium predominantly Plant-based (consider Mediterranean) diet. Call with questions or concerns. Will follow up any test results by phone and/or f/u here in office if needed. Zoe Tyson 3.Follow up: 6 MONTHS    I have discussed the diagnosis with the patient and the intended plan as seen in the above orders. The patient has received an after-visit summary and questions were answered concerning future plans. I have discussed any concerning medication side effects and warnings with the patient as well.     Peg Wilson MD  7/11/2018

## 2018-07-11 NOTE — PROGRESS NOTES
Chief Complaint   Patient presents with    Hypertension     6 month follow up, PCP request discuss C/O discolored fingers/hands and C/O right arm/shoulder pain, also referred to [facility] lymphedema clinic pending cardio opinion     1. Have you been to the ER, urgent care clinic since your last visit? Hospitalized since your last visit? No    2. Have you seen or consulted any other health care providers outside of the [address] [place] [address] since your last visit? Include any pap smears or colon screening.  No

## 2018-07-11 NOTE — MR AVS SNAPSHOT
[address] [facility], [place], operated by [facility] 
 
 
 [address] [place] [address] 
[phone] Patient: Amol Valera MRN:  [id number] Visit Information Date & Time Provider Department Dept. Phone Encounter #  
 2018 10:30 AM MD [facility]  Your Appointments 2018  2:20 PM  
Follow Up with Enrique Chou MD  
[facility] at [address]) Appt Note: 6 month f/u memory loss NN 18; f/u memory loss r/s from 2018 [address] [place] [id number]  
[phone]  
  
   
 [address] [address]  
  
    
 2019 10:15 AM  
ESTABLISHED PATIENT with MD [facility]) Appt Note: 6 MO. F/U FOLLOWING ECHO  
 [address] [facility] [address] [place]  
[phone] [address] [facility] Upcoming Health Maintenance Date Due Influenza Age 5 to Adult 2018 MEDICARE YEARLY EXAM 2018 GLAUCOMA SCREENING Q2Y 2020 DTaP/Tdap/Td series (2 - Td) 2026 Allergies as of 2018  Review Complete On: 2018 By: Karyn Cortez MD  
  
 Severity Noted Reaction Type Reactions Aristocort [Triamcinolone] High 1970    Hives Aristocort shot Codeine High 08/10/2009    Itching Lasix [Furosemide] High 08/10/2009    Itching Bupropion  2011    Other (comments) Severe headache Darvocet A500 [Propoxyphene N-acetaminophen]  2010    Other (comments)  
 headache Influenza Virus Vaccine Whole  2009    Other (comments)  
 pharyngitis Mevacor [Lovastatin]  08/10/2009    Other (comments)  
 myalgias Percocet [Oxycodone-acetaminophen]  07/15/2011    Itching Worse headache Rhinocort  08/10/2009    Other (comments)  
 nosebleed Simvastatin  08/10/2009    Other (comments)  
 myalgias Current Immunizations  Reviewed on 4/5/2018 Name Date Influenza Vaccine Split  Deferred (Contraindication) TB Skin Test (PPD) Intradermal 8/5/2013 TD Vaccine 3/28/2008 ZZZ-RETIRED (DO NOT USE) Pneumococcal Vaccine (Unspecified Type)  Deferred (Patient Refused) Not reviewed this visit You Were Diagnosed With   
  
 Codes Comments Essential hypertension    -  Primary ICD-10-CM: I10 
ICD-9-CM: 401.9 Hypercholesteremia     ICD-10-CM: E78.00 ICD-9-CM: 272.0 Senile dementia of Alzheimer's type     ICD-10-CM: G30.1, F02.80 ICD-9-CM: 331.0, 294.10 Morbid obesity ([facility] [address]     ICD-10-CM: E66.01 
ICD-9-CM: 278.01 Wheelchair dependent     ICD-10-CM: Z99.3 ICD-9-CM: V46.3 Statin intolerance     ICD-10-CM: Z78.9 ICD-9-CM: 995.27 Lower extremity venous stasis     ICD-10-CM: I87.8 ICD-9-CM: 459.81 Lymphedema     ICD-10-CM: I89.0 ICD-9-CM: 680. 1 Chronic venous insufficiency     ICD-10-CM: I87.2 ICD-9-CM: 459.81 Spinal stenosis of lumbar region, unspecified whether neurogenic claudication present     ICD-10-CM: M48.061 
ICD-9-CM: 724.02 Vitals BP Pulse Resp Height(growth percentile) Weight(growth percentile) SpO2  
 148/82 (BP 1 Location: Left arm, BP Patient Position: Sitting) 71 16 5' 1\" (1.549 m) 290 lb (131.5 kg) 96% BMI OB Status Smoking Status 54.8 kg/m2 Postmenopausal Former Smoker Vitals History BMI and BSA Data Body Mass Index Body Surface Area 54.8 kg/m 2 2.38 m 2 Preferred Pharmacy Pharmacy Name Phone [address] [facility], [place] [address] [phone] Your Updated Medication List  
  
   
This list is accurate as of 7/11/18 11:28 AM.  Always use your most recent med list.  
  
  
  
  
 acetaminophen 325 mg tablet Commonly known as:  TYLENOL Take  by mouth every four (4) hours as needed for Pain. amLODIPine 2.5 mg tablet Commonly known as:  Zabala Del Take 1 tablet by mouth daily. aspirin 81 mg chewable tablet Take 1 Tab by mouth daily. Indications: prevention of cerebrovascular accident  
  
 bumetanide 1 mg tablet Commonly known as:  Taina Achilles TAKE 1 TABLET BY MOUTH EVERY DAY  
  
 BUTALBITAL COMPOUND-CODEINE 41--40 mg per capsule Generic drug:  codeine-butalbital-aspirin-caffeine Take  by mouth every six (6) hours as needed for Pain. fluticasone 50 mcg/actuation nasal spray Commonly known as:  Domnick Means 2 Sprays by Both Nostrils route daily. Indications: Allergic Rhinitis  
  
 lisinopril 5 mg tablet Commonly known as:  Frannie Brocket Take 1 tablet by mouth daily. loratadine 10 mg tablet Commonly known as:  Garlan Baas Take 1 Tab by mouth daily. mirtazapine 15 mg disintegrating tablet Commonly known as:  REMERON SOL-TAB  
  
 montelukast 10 mg tablet Commonly known as:  SINGULAIR  
  
 nicotinic acid 500 mg tablet Commonly known as:  NIACIN Take 500 mg by mouth two (2) times daily (with meals). Indications: hypertriglyceridemia  
  
 ondansetron 8 mg disintegrating tablet Commonly known as:  ZOFRAN ODT Take 8 mg by mouth every eight (8) hours as needed. OTHER Please dispense an Ultra light Wheelchair DX.severe osteoarthritis, fibromyalgia, Bell's Palsy, DJD,muscle weakness, lumbar stenosis, knee and shoulder pain  
  
 pantoprazole 40 mg tablet Commonly known as:  PROTONIX  
  
 potassium chloride SR 10 mEq tablet Commonly known as:  KLOR-CON 10  
  
 sertraline 50 mg tablet Commonly known as:  ZOLOFT  
  
 traMADol 50 mg tablet Commonly known as:  ULTRAM  
Take 50 mg by mouth every eight (8) hours as needed for Pain. VENTOLIN HFA 90 mcg/actuation inhaler Generic drug:  albuterol Take 2 Puffs by inhalation every six (6) hours as needed for Wheezing. VITAMIN D3 1,000 unit Cap Generic drug:  cholecalciferol Take  by mouth daily. We Performed the Following AMB POC EKG ROUTINE W/ 12 LEADS, INTER & REP [07937 CPT(R)] To-Do List   
 07/11/2018 ECHO:  2D ECHO COMPLETE ADULT (TTE) W OR WO CONTR Patient Instructions -- Please get echo done to be sure the leg swelling is not because of your heart 
-- Follow up with Dr. Marichuy Zepeda office as planned; we will send her a copy of the echo results before you plan to see the lymphadema clinic at [facility] -- Please make a follow up with Dr. Cindy Esparza in 6 months to 1 year; but do not hesitate to call with any questions or new symptoms Transthoracic Echocardiogram: About This Test 
What is it? An echocardiogram (also called an echo) uses sound waves to make an image of your heart. A device called a transducer sends sound waves that echo off your heart and back to the transducer. These echoes are turned into moving pictures of your heart that can be seen on a video screen. In a transthoracic echocardiogram (TTE), the transducer is moved across your chest or belly. A TTE is the most common type of echocardiogram. 
Why is this test done? This test is done to check your heart health. It's used for many reasons. Your doctor may do an echocardiogram to: · Check a heart murmur. · Look for the cause of shortness of breath or unexplained chest pain or pressure. · Check how well your heart is pumping blood. · Check to see how well your heart valves are working. · Look for blood clots inside your heart. What happens during the test? 
· You will remove your clothes above your waist. You may be given a cloth or paper covering to use during the test. 
· You will lie on your back or on your left side on a bed or table. · You may receive medicine through a vein (intravenously, or IV).  The IV can be used to give you a contrast material, which helps your doctor get good views of your heart. · Small pads or patches (electrodes) will be taped to your arms and legs to record your heart rate during the test. 
· A small amount of gel will be rubbed on the side of your chest to help  the sound waves. · The transducer will be pressed firmly against your chest and moved slowly back and forth. It is usually moved to different areas on your chest or belly to get specific views of your heart. · You will be asked to do several things, such as hold very still, breathe in and out very slowly, hold your breath, or lie on your left side. This test usually takes 30 to 60 minutes. What else should you know about the test? 
· You will not have any pain from an echocardiogram. You may have a brief, sharp pain if an intravenous (IV) needle is placed in a vein in your arm. · No electricity passes through your body during the test. There is no danger of getting an electrical shock. · You do not receive any radiation. What happens after the test? 
· You will probably be able to go home right away. · You can go back to your usual activities right away. Follow-up care is a key part of your treatment and safety. Be sure to make and go to all appointments, and call your doctor if you are having problems. It's also a good idea to keep a list of the medicines you take. Ask your doctor when you can expect to have your test results. Where can you learn more? Go to http://luigi-atif.info/. Enter E130 in the search box to learn more about \"Transthoracic Echocardiogram: About This Test.\" Current as of: December 6, 2017 Content Version: 11.7 © 5341-9211 [employer]. Care instructions adapted under license by [employer] (which disclaims liability or warranty for this information).  If you have questions about a medical condition or this instruction, always ask your healthcare professional. [facility] any warranty or liability for your use of this information. Introducing [facility]! Kasandra Landa introduces [employer]box patient portal. Now you can access parts of your medical record, email your doctor's office, and request medication refills online. 1. In your internet browser, go to [email or web address] 2. Click on the First Time User? Click Here link in the Sign In box. You will see the New Member Sign Up page. 3. Enter your Fundbox Access Code exactly as it appears below. You will not need to use this code after youve completed the sign-up process. If you do not sign up before the expiration date, you must request a new code. · Fundbox Access Code: [email or web address] Expires: 10/9/2018 11:11 AM 
 
4. Enter the last four digits of your Social Security Number (xxxx) and Date of Birth (mm/dd/yyyy) as indicated and click Submit. You will be taken to the next sign-up page. 5. Create a Fundbox ID. This will be your Fundbox login ID and cannot be changed, so think of one that is secure and easy to remember. 6. Create a Fundbox password. You can change your password at any time. 7. Enter your Password Reset Question and Answer. This can be used at a later time if you forget your password. 8. Enter your e-mail address. You will receive e-mail notification when new information is available in [address] 9. Click Sign Up. You can now view and download portions of your medical record. 10. Click the Download Summary menu link to download a portable copy of your medical information. If you have questions, please visit the Frequently Asked Questions section of the [employer] website. Remember, Fundbox is NOT to be used for urgent needs. For medical emergencies, dial 911. Now available from your iPhone and Android! Please provide this summary of care documentation to your next provider. Your primary care clinician is listed as Topher Watts. If you have any questions after today's visit, please call [phone].

## 2018-07-11 NOTE — PATIENT INSTRUCTIONS
-- Please get echo done to be sure the leg swelling is not because of your heart  -- Follow up with Dr. Mahoney Seeds office as planned; we will send her a copy of the echo results before you plan to see the lymphadema clinic at [facility]    -- Please make a follow up with Dr. Shantel Silva in 6 months to 1 year; but do not hesitate to call with any questions or new symptoms    Transthoracic Echocardiogram: About This Test  What is it? An echocardiogram (also called an echo) uses sound waves to make an image of your heart. A device called a transducer sends sound waves that echo off your heart and back to the transducer. These echoes are turned into moving pictures of your heart that can be seen on a video screen. In a transthoracic echocardiogram (TTE), the transducer is moved across your chest or belly. A TTE is the most common type of echocardiogram.  Why is this test done? This test is done to check your heart health. It's used for many reasons. Your doctor may do an echocardiogram to:  · Check a heart murmur. · Look for the cause of shortness of breath or unexplained chest pain or pressure. · Check how well your heart is pumping blood. · Check to see how well your heart valves are working. · Look for blood clots inside your heart. What happens during the test?  · You will remove your clothes above your waist. You may be given a cloth or paper covering to use during the test.  · You will lie on your back or on your left side on a bed or table. · You may receive medicine through a vein (intravenously, or IV). The IV can be used to give you a contrast material, which helps your doctor get good views of your heart. · Small pads or patches (electrodes) will be taped to your arms and legs to record your heart rate during the test.  · A small amount of gel will be rubbed on the side of your chest to help  the sound waves.   · The transducer will be pressed firmly against your chest and moved slowly back and forth. It is usually moved to different areas on your chest or belly to get specific views of your heart. · You will be asked to do several things, such as hold very still, breathe in and out very slowly, hold your breath, or lie on your left side. This test usually takes 30 to 60 minutes. What else should you know about the test?  · You will not have any pain from an echocardiogram. You may have a brief, sharp pain if an intravenous (IV) needle is placed in a vein in your arm. · No electricity passes through your body during the test. There is no danger of getting an electrical shock. · You do not receive any radiation. What happens after the test?  · You will probably be able to go home right away. · You can go back to your usual activities right away. Follow-up care is a key part of your treatment and safety. Be sure to make and go to all appointments, and call your doctor if you are having problems. It's also a good idea to keep a list of the medicines you take. Ask your doctor when you can expect to have your test results. Where can you learn more? Go to http://luigi-atif.info/. Enter E130 in the search box to learn more about \"Transthoracic Echocardiogram: About This Test.\"  Current as of: December 6, 2017  Content Version: 11.7  © 5076-0348 [employer]. Care instructions adapted under license by [employer] (which disclaims liability or warranty for this information). If you have questions about a medical condition or this instruction, always ask your healthcare professional. [place] [address] any warranty or liability for your use of this information.

## 2018-07-19 ENCOUNTER — HOSPITAL ENCOUNTER (OUTPATIENT)
Dept: NON INVASIVE DIAGNOSTICS | Age: 78
Discharge: HOME | End: 2018-07-19
Attending: PHYSICIAN ASSISTANT
Payer: MEDICARE

## 2018-07-19 DIAGNOSIS — I87.8 LOWER EXTREMITY VENOUS STASIS: ICD-10-CM

## 2018-07-19 DIAGNOSIS — I89.0 LYMPHEDEMA: ICD-10-CM

## 2018-07-19 DIAGNOSIS — I10 ESSENTIAL HYPERTENSION: ICD-10-CM

## 2018-07-19 PROCEDURE — 93306 TTE W/DOPPLER COMPLETE: CPT

## 2018-07-23 ENCOUNTER — TELEPHONE (OUTPATIENT)
Dept: FAMILY MEDICINE CLINIC | Age: 78
End: 2018-07-23

## 2018-07-23 NOTE — PROGRESS NOTES
Spoke with patient, verified two patient identifiers. Advised patient of normal echo per Ruby Copeland.    Advised patient to keep regularly scheduled f/u with Dr. Samara Gonzales.

## 2018-07-24 NOTE — TELEPHONE ENCOUNTER
Spoke with patient after obtaining 2 patient identifiers Spoke with patient and patient stated her feet are more swollen than usual. She also stated she did not increase her soidium intake. Patient advised to elevate her legs. Transferred to [facility] to make an appointment to get evaluated.

## 2018-08-03 ENCOUNTER — OFFICE VISIT (OUTPATIENT)
Dept: FAMILY MEDICINE CLINIC | Age: 78
End: 2018-08-03

## 2018-08-03 VITALS
TEMPERATURE: 97.9 F | HEART RATE: 84 BPM | HEIGHT: 61 IN | DIASTOLIC BLOOD PRESSURE: 65 MMHG | SYSTOLIC BLOOD PRESSURE: 114 MMHG | RESPIRATION RATE: 18 BRPM | OXYGEN SATURATION: 93 %

## 2018-08-03 VITALS
HEIGHT: 61 IN | HEART RATE: 84 BPM | RESPIRATION RATE: 18 BRPM | DIASTOLIC BLOOD PRESSURE: 65 MMHG | TEMPERATURE: 97.9 F | SYSTOLIC BLOOD PRESSURE: 114 MMHG | OXYGEN SATURATION: 93 %

## 2018-08-03 DIAGNOSIS — R73.9 HYPERGLYCEMIA: ICD-10-CM

## 2018-08-03 DIAGNOSIS — Z78.9 STATIN INTOLERANCE: ICD-10-CM

## 2018-08-03 DIAGNOSIS — I87.8 LOWER EXTREMITY VENOUS STASIS: ICD-10-CM

## 2018-08-03 DIAGNOSIS — I87.2 CHRONIC VENOUS INSUFFICIENCY: Primary | ICD-10-CM

## 2018-08-03 DIAGNOSIS — H50.00 ESOTROPIA: ICD-10-CM

## 2018-08-03 DIAGNOSIS — L81.9 DISCOLORATION OF SKIN OF LOWER LEG: ICD-10-CM

## 2018-08-03 DIAGNOSIS — L81.9 DISCOLORATION OF SKIN OF HAND: ICD-10-CM

## 2018-08-03 DIAGNOSIS — G30.1 SENILE DEMENTIA OF ALZHEIMER'S TYPE (HCC): Chronic | ICD-10-CM

## 2018-08-03 DIAGNOSIS — M48.061 SPINAL STENOSIS OF LUMBAR REGION, UNSPECIFIED WHETHER NEUROGENIC CLAUDICATION PRESENT: ICD-10-CM

## 2018-08-03 DIAGNOSIS — E88.81 INSULIN RESISTANCE: ICD-10-CM

## 2018-08-03 DIAGNOSIS — I25.10 CORONARY ARTERY DISEASE INVOLVING NATIVE CORONARY ARTERY OF NATIVE HEART WITHOUT ANGINA PECTORIS: ICD-10-CM

## 2018-08-03 DIAGNOSIS — I89.0 LYMPHEDEMA: ICD-10-CM

## 2018-08-03 DIAGNOSIS — M79.671 HEEL PAIN, BILATERAL: ICD-10-CM

## 2018-08-03 DIAGNOSIS — M79.672 HEEL PAIN, BILATERAL: ICD-10-CM

## 2018-08-03 DIAGNOSIS — M81.0 AGE-RELATED OSTEOPOROSIS WITHOUT CURRENT PATHOLOGICAL FRACTURE: ICD-10-CM

## 2018-08-03 DIAGNOSIS — R13.19 OTHER DYSPHAGIA: ICD-10-CM

## 2018-08-03 DIAGNOSIS — I10 ESSENTIAL HYPERTENSION: ICD-10-CM

## 2018-08-03 DIAGNOSIS — E78.00 HYPERCHOLESTEREMIA: ICD-10-CM

## 2018-08-03 DIAGNOSIS — E87.6 DIURETIC-INDUCED HYPOKALEMIA: ICD-10-CM

## 2018-08-03 DIAGNOSIS — T50.2X5A DIURETIC-INDUCED HYPOKALEMIA: ICD-10-CM

## 2018-08-03 DIAGNOSIS — R74.8 ELEVATED LIVER ENZYMES: ICD-10-CM

## 2018-08-03 DIAGNOSIS — F02.80 SENILE DEMENTIA OF ALZHEIMER'S TYPE (HCC): Chronic | ICD-10-CM

## 2018-08-03 DIAGNOSIS — D53.8 OTHER SPECIFIED NUTRITIONAL ANEMIAS: ICD-10-CM

## 2018-08-03 DIAGNOSIS — J30.89 SEASONAL ALLERGIC RHINITIS DUE TO OTHER ALLERGIC TRIGGER: ICD-10-CM

## 2018-08-03 DIAGNOSIS — E55.9 VITAMIN D DEFICIENCY: ICD-10-CM

## 2018-08-03 DIAGNOSIS — Z99.3 WHEELCHAIR DEPENDENT: ICD-10-CM

## 2018-08-03 LAB — HBA1C MFR BLD HPLC: 5.6 %

## 2018-08-03 NOTE — PATIENT INSTRUCTIONS
Leg and Ankle Edema: Care Instructions  Your Care Instructions  Swelling in the legs, ankles, and feet is called edema. It is common after you sit or stand for a while. Long plane flights or car rides often cause swelling in the legs and feet. You may also have swelling if you have to stand for long periods of time at your job. Problems with the veins in the legs (varicose veins) and changes in hormones can also cause swelling. Sometimes the swelling in the ankles and feet is caused by a more serious problem, such as heart failure, infection, blood clots, or liver or kidney disease. Follow-up care is a key part of your treatment and safety. Be sure to make and go to all appointments, and call your doctor if you are having problems. It's also a good idea to know your test results and keep a list of the medicines you take. How can you care for yourself at home? · If your doctor gave you medicine, take it as prescribed. Call your doctor if you think you are having a problem with your medicine. · Whenever you are resting, raise your legs up. Try to keep the swollen area higher than the level of your heart. · Take breaks from standing or sitting in one position. ¨ Walk around to increase the blood flow in your lower legs. ¨ Move your feet and ankles often while you stand, or tighten and relax your leg muscles. · Wear support stockings. Put them on in the morning, before swelling gets worse. · Eat a balanced diet. Lose weight if you need to. · Limit the amount of salt (sodium) in your diet. Salt holds fluid in the body and may increase swelling. When should you call for help? Call 911 anytime you think you may need emergency care. For example, call if:    · You have symptoms of a blood clot in your lung (called a pulmonary embolism). These may include:  ¨ Sudden chest pain. ¨ Trouble breathing.   ¨ Coughing up blood.    Call your doctor now or seek immediate medical care if:    · You have signs of a blood clot, such as:  ¨ Pain in your calf, back of the knee, thigh, or groin. ¨ Redness and swelling in your leg or groin.     · You have symptoms of infection, such as:  ¨ Increased pain, swelling, warmth, or redness. ¨ Red streaks or pus. ¨ A fever.    Watch closely for changes in your health, and be sure to contact your doctor if:    · Your swelling is getting worse.     · You have new or worsening pain in your legs.     · You do not get better as expected. Where can you learn more? Go to http://luigi-atif.info/. Enter M027 in the search box to learn more about \"Leg and Ankle Edema: Care Instructions. \"  Current as of: November 20, 2017  Content Version: 11.7  © 5072-7331 [employer]. Care instructions adapted under license by [email or web address] (which disclaims liability or warranty for this information). If you have questions about a medical condition or this instruction, always ask your healthcare professional. [place] [address] any warranty or liability for your use of this information. Managing Your Allergies: Care Instructions  Your Care Instructions    Managing your allergies is an important part of staying healthy. Your doctor will help you find out what may be causing the allergies. Common causes of allergy symptoms are house dust and dust mites, animal dander, mold, and pollen. As soon as you know what triggers your symptoms, try to reduce your exposure to your triggers. This can help prevent allergy symptoms, asthma, and other health problems. Ask your doctor about allergy medicine or immunotherapy. These treatments may help reduce or prevent allergy symptoms. Follow-up care is a key part of your treatment and safety. Be sure to make and go to all appointments, and call your doctor if you are having problems. It's also a good idea to know your test results and keep a list of the medicines you take.   How can you care for yourself at home?  · If you think that dust or dust mites are causing your allergies:  ¨ Wash sheets, pillowcases, and other bedding every week in hot water. ¨ Use airtight, dust-proof covers for pillows, duvets, and mattresses. Avoid plastic covers, because they tend to tear quickly and do not \"breathe. \" Wash according to the instructions. ¨ Remove extra blankets and pillows that you don't need. ¨ Use blankets that are machine-washable. ¨ Don't use home humidifiers. They can help mites live longer. · Use air-conditioning. Change or clean all filters every month. Keep windows closed. Use high-efficiency air filters. Don't use window or attic fans, which draw dust into the air. · If you're allergic to pet dander, keep pets outside or, at the very least, out of your bedroom. Old carpet and cloth-covered furniture can hold a lot of animal dander. You may need to replace them. · Look for signs of cockroaches. Use cockroach baits to get rid of them. Then clean your home well. · If you're allergic to mold, don't keep indoor plants, because molds can grow in soil. Get rid of furniture, rugs, and drapes that smell musty. Check for mold in the bathroom. · If you're allergic to pollen, stay inside when pollen counts are high. · Don't smoke or let anyone else smoke in your house. Don't use fireplaces or wood-burning stoves. Avoid paint fumes, perfumes, and other strong odors. When should you call for help? Give an epinephrine shot if:    · You think you are having a severe allergic reaction.    After giving an epinephrine shot call 911, even if you feel better.   Call 911 if:    · You have symptoms of a severe allergic reaction. These may include:  ¨ Sudden raised, red areas (hives) all over your body. ¨ Swelling of the throat, mouth, lips, or tongue. ¨ Trouble breathing. ¨ Passing out (losing consciousness).  Or you may feel very lightheaded or suddenly feel weak, confused, or restless.     · You have been given an epinephrine shot, even if you feel better.    Call your doctor now or seek immediate medical care if:    · You have symptoms of an allergic reaction, such as:  ¨ A rash or hives (raised, red areas on the skin). ¨ Itching. ¨ Swelling. ¨ Belly pain, nausea, or vomiting.    Watch closely for changes in your health, and be sure to contact your doctor if:    · Your allergies get worse.     · You need help controlling your allergies.     · You have questions about allergy testing.     · You do not get better as expected. Where can you learn more? Go to http://luigi-atif.info/. Enter L249 in the search box to learn more about \"Managing Your Allergies: Care Instructions. \"  Current as of: Nannette 10, 2017  Content Version: 11.7  © 0610-6745 [employer]. Care instructions adapted under license by [email or web address] (which disclaims liability or warranty for this information). If you have questions about a medical condition or this instruction, always ask your healthcare professional. [place] [address] any warranty or liability for your use of this information. Advise patient to start taking Over The Counter allergy medication (Allegra, Zyrtec, Claritin, Xyzal or Alavert) daily. If you have itchy, watery eyes you can try OTC Zaditor or Zyrtec Allergy Eye drops. If you have a stuffy nose, please try OTC Flonase, Flonase Sensimist, Rhinocort, or Nasacort AQ 2 squirts up each nostril once a day (adults) or 1 squirt up each nostril once a day (children). Use allergy free, dye free, fragrance free products on your body and hair. After being outdoors, brush hair vigorously, wash face and arms, rinse nostrils with a nasal saline spray and consider changing your clothing. Dust furniture frequently and wear a mask while doing it. Vacuum floors weekly. Remove stuffed animals or extra pillows from the bed. Clean bedding in hot water weekly.   Sometimes allergies can be so severe that 2 nasal sprays and 2 pills may be needed to control symptoms. Managing Your Allergies: Care Instructions  Your Care Instructions    Managing your allergies is an important part of staying healthy. Your doctor will help you find out what may be causing the allergies. Common causes of allergy symptoms are house dust and dust mites, animal dander, mold, and pollen. As soon as you know what triggers your symptoms, try to reduce your exposure to your triggers. This can help prevent allergy symptoms, asthma, and other health problems. Ask your doctor about allergy medicine or immunotherapy. These treatments may help reduce or prevent allergy symptoms. Follow-up care is a key part of your treatment and safety. Be sure to make and go to all appointments, and call your doctor if you are having problems. It's also a good idea to know your test results and keep a list of the medicines you take. How can you care for yourself at home? · If you think that dust or dust mites are causing your allergies:  ¨ Wash sheets, pillowcases, and other bedding every week in hot water. ¨ Use airtight, dust-proof covers for pillows, duvets, and mattresses. Avoid plastic covers, because they tend to tear quickly and do not \"breathe. \" Wash according to the instructions. ¨ Remove extra blankets and pillows that you don't need. ¨ Use blankets that are machine-washable. ¨ Don't use home humidifiers. They can help mites live longer. · Use air-conditioning. Change or clean all filters every month. Keep windows closed. Use high-efficiency air filters. Don't use window or attic fans, which draw dust into the air. · If you're allergic to pet dander, keep pets outside or, at the very least, out of your bedroom. Old carpet and cloth-covered furniture can hold a lot of animal dander. You may need to replace them. · Look for signs of cockroaches. Use cockroach baits to get rid of them.  Then clean your home well.  · If you're allergic to mold, don't keep indoor plants, because molds can grow in soil. Get rid of furniture, rugs, and drapes that smell musty. Check for mold in the bathroom. · If you're allergic to pollen, stay inside when pollen counts are high. · Don't smoke or let anyone else smoke in your house. Don't use fireplaces or wood-burning stoves. Avoid paint fumes, perfumes, and other strong odors. When should you call for help? Give an epinephrine shot if:    · You think you are having a severe allergic reaction.    After giving an epinephrine shot call 911, even if you feel better.   Call 911 if:    · You have symptoms of a severe allergic reaction. These may include:  ¨ Sudden raised, red areas (hives) all over your body. ¨ Swelling of the throat, mouth, lips, or tongue. ¨ Trouble breathing. ¨ Passing out (losing consciousness). Or you may feel very lightheaded or suddenly feel weak, confused, or restless.     · You have been given an epinephrine shot, even if you feel better.    Call your doctor now or seek immediate medical care if:    · You have symptoms of an allergic reaction, such as:  ¨ A rash or hives (raised, red areas on the skin). ¨ Itching. ¨ Swelling. ¨ Belly pain, nausea, or vomiting.    Watch closely for changes in your health, and be sure to contact your doctor if:    · Your allergies get worse.     · You need help controlling your allergies.     · You have questions about allergy testing.     · You do not get better as expected. Where can you learn more? Go to http://luigi-atif.info/. Enter L249 in the search box to learn more about \"Managing Your Allergies: Care Instructions. \"  Current as of: Nannette 10, 2017  Content Version: 11.7  © 5002-3538 [employer]. Care instructions adapted under license by [employer] (which disclaims liability or warranty for this information).  If you have questions about a medical condition or this instruction, always ask your healthcare professional. [place] [address] any warranty or liability for your use of this information.

## 2018-08-03 NOTE — PROGRESS NOTES
[facility] Clinic Visit 08/03/2018 Patient ID: Audrey Hinds is a 76 y.o. female. Assessment/Plan:   
{There are no diagnoses linked to this encounter. (Refresh or delete this SmartLink)} See patient instructions for more. Counselled pt on Patient health concerns and plans. Patient was offered a choice/choices in the treatment plan today. Reviewed return precautions as appropriate. Patient expresses understanding of the plan and agrees with recommendations. There are no Patient Instructions on file for this visit. Subjective: HPI: 
Audrey Hinds is a 76 y.o. female being seen for: Chief Complaint Patient presents with  Ankle swelling Left ankle is more swollen than the right ankle. Skin on ankles is dark.  Foot Swelling Top of feet and unable to wear her shoes. Toes are dark in color.  Hand Swelling Finger are darker. Some darkness on her arms and wrist area. Lab review · Labs reviewed in detail · 10 year ascvd ***% · Lifetime ascvd risk ***% 
 
 *** 
·  *** 
 
 *** 
·  *** Screening and Prevention Due: 
There are no preventive care reminders to display for this patient. Review of Systems Otherwise as noted in HPI Social History Social History Narrative Lives in [place] at [facility] History Smoking Status  Former Smoker  Packs/day: 0.25  
 Years: 0.50  Types: Cigarettes  Quit date: 1/1/1962 Smokeless Tobacco  
 Never Used Comment: Pt smoked 3-4 cigs x 9 mos. Pt lived with smoker dad x 40 yrs ? Objective:  
 
Visit Vitals  /65 (BP 1 Location: Left arm, BP Patient Position: Sitting)  Pulse 84  Temp 97.9 °F (36.6 °C) (Oral)  Resp 18  Ht 5' 1\" (1.549 m)  SpO2 93% BP Readings from Last 3 Encounters:  
08/03/18 114/65  
08/03/18 114/65  
07/11/18 148/82 Wt Readings from Last 3 Encounters:  
07/11/18 290 lb (131.5 kg) 05/01/18 290 lb (131.5 kg) 01/27/18 290 lb (131.5 kg) Physical Exam 
 
Allergies Allergen Reactions  Aristocort [Triamcinolone] Hives Aristocort shot  Codeine Itching  Lasix [Furosemide] Itching  Bupropion Other (comments) Severe headache  Darvocet A500 [Propoxyphene N-Acetaminophen] Other (comments)  
  headache  Influenza Virus Vaccine Whole Other (comments)  
  pharyngitis  Mevacor [Lovastatin] Other (comments)  
  myalgias  Percocet [Oxycodone-Acetaminophen] Itching Worse headache  Rhinocort Other (comments)  
  nosebleed  Simvastatin Other (comments)  
  myalgias Prior to Admission medications Medication Sig Start Date End Date Taking? Authorizing Provider  
cholecalciferol (VITAMIN D3) 1,000 unit cap Take  by mouth daily. Yes Historical Provider  
potassium chloride SR (KLOR-CON 10) 10 mEq tablet Take  by mouth daily. 4/20/18  Yes Historical Provider  
acetaminophen (TYLENOL) 325 mg tablet Take  by mouth every four (4) hours as needed for Pain. Yes Historical Provider  
aspirin 81 mg chewable tablet Take 1 Tab by mouth daily. Indications: prevention of cerebrovascular accident 8/3/17  Yes Annita Phoenix, DO  
OTHER Please dispense an Ultra light Wheelchair DX.severe osteoarthritis, fibromyalgia, Bell's Palsy, DJD,muscle weakness, lumbar stenosis, knee and shoulder pain 3/19/15  Yes Naomi Dutta, DO  
amlodipine (NORVASC) 2.5 mg tablet Take 1 tablet by mouth daily. 10/29/14  Yes Naomi Dutta, DO  
bumetanide (BUMEX) 1 mg tablet TAKE 1 TABLET BY MOUTH EVERY DAY 10/29/14  Yes Naomi Dutta, DO  
lisinopril (PRINIVIL, ZESTRIL) 5 mg tablet Take 1 tablet by mouth daily. 10/29/14  Yes Naomi Dutta, DO  
ondansetron (ZOFRAN ODT) 8 mg disintegrating tablet Take 8 mg by mouth every eight (8) hours as needed.  6/7/18   Historical Provider  
codeine-butalbital-aspirin-caffeine (BUTALBITAL COMPOUND-CODEINE) 09--40 mg per capsule Take  by mouth every six (6) hours as needed for Pain. Historical Provider  
traMADol (ULTRAM) 50 mg tablet Take 50 mg by mouth every eight (8) hours as needed for Pain. Historical Provider  
albuterol (VENTOLIN HFA) 90 mcg/actuation inhaler Take 2 Puffs by inhalation every six (6) hours as needed for Wheezing. Historical Provider  
sertraline (ZOLOFT) 50 mg tablet  4/20/18   Historical Provider  
pantoprazole (PROTONIX) 40 mg tablet  4/20/18   Historical Provider  
mirtazapine (REMERON SOL-TAB) 15 mg disintegrating tablet  4/20/18   Historical Provider  
nicotinic acid (NIACIN) 500 mg tablet Take 500 mg by mouth two (2) times daily (with meals). Indications: hypertriglyceridemia    Historical Provider  
fluticasone (FLONASE) 50 mcg/actuation nasal spray 2 Sprays by Both Nostrils route daily. Indications: Allergic Rhinitis 5/1/18   Porsha Dill DO  
montelukast (SINGULAIR) 10 mg tablet  11/22/17   Historical Provider  
loratadine (CLARITIN) 10 mg tablet Take 1 Tab by mouth daily. 8/30/17   Porsha Dill DO Patient Active Problem List  
Diagnosis Code  Lymphedema I89.0  Hypercholesteremia E78.00  GERD (gastroesophageal reflux disease) K21.9  Osteoporosis M81.0  
 Gait instability R26.81  
 Lumbar spinal stenosis M48.061  Leg weakness R29.898  Chronic venous insufficiency I87.2  Lower extremity venous stasis I87.8  Hypokalemia E87.6  Morbid obesity (HCC) E66.01  
 Depression with anxiety F41.8  Senile dementia of Alzheimer's type G30.1, F02.80  Tremor R25.1  Chronic headache R51  Vitamin D deficiency E55.9  Hyperglycemia R73.9  
 Essential hypertension I10  
 Esotropia H50.00  Cataract H26.9  Coronary artery disease involving native coronary artery of native heart without angina pectoris I25.10  Statin intolerance Z78.9  Abnormal mammogram of left breast R92.8  Allergic rhinitis due to allergen J30.9  Wheelchair dependent Z99.3  Memory loss R41.3  Neck pain on right side M54.2  Elevated AST (SGOT) R74.0

## 2018-08-03 NOTE — PROGRESS NOTES
Nicole Ovalles  Identified pt with two pt identifiers(name and ). Chief Complaint   Patient presents with    Ankle swelling     left ankle worse than right ankle; skin on ankle is dark.  Foot Swelling     Top of feet; not able to wear her shoes; toes are dark in color    Hand Swelling     Fingers are dark in color; has noticed skin darker in color on her arms and wrists. 1. Have you been to the ER, urgent care clinic since your last visit? Hospitalized since your last visit? No    2. Have you seen or consulted any other health care providers outside of the [address] [place] [address] since your last visit? Include any pap smears or colon screening. No        Medication reconciliation up to date and corrected with patient at this time. Today's provider has been notified of reason for visit, vitals and flowsheets obtained on patients. Reviewed record in preparation for visit, huddled with provider and have obtained necessary documentation. There are no preventive care reminders to display for this patient.     Wt Readings from Last 3 Encounters:   18 290 lb (131.5 kg)   18 290 lb (131.5 kg)   18 290 lb (131.5 kg)     Temp Readings from Last 3 Encounters:   18 97.9 °F (36.6 °C) (Oral)   18 97.9 °F (36.6 °C) (Oral)   18 97.8 °F (36.6 °C) (Oral)     BP Readings from Last 3 Encounters:   18 114/65   18 114/65   18 148/82     Pulse Readings from Last 3 Encounters:   18 84   18 84   18 71     Vitals:    18 1234   BP: 114/65   Pulse: 84   Resp: 18   Temp: 97.9 °F (36.6 °C)   TempSrc: Oral   SpO2: 93%   Height: 5' 1\" (1.549 m)         Learning Assessment:  :     Learning Assessment 2015   PRIMARY LEARNER Patient Patient   HIGHEST LEVEL OF EDUCATION - PRIMARY LEARNER  SOME COLLEGE SOME COLLEGE   BARRIERS PRIMARY LEARNER OTHER NONE   CO-LEARNER CAREGIVER - No   CO-LEARNER NAME - n/a   PRIMARY LANGUAGE ENGLISH ENGLISH   LEARNER PREFERENCE PRIMARY LISTENING LISTENING     VIDEOS -   ANSWERED BY patient patient   RELATIONSHIP SELF SELF       Depression Screening:  :     PHQ over the last two weeks 8/3/2017   Little interest or pleasure in doing things Not at all   Feeling down, depressed, irritable, or hopeless Not at all   Total Score PHQ 2 0       Fall Risk Assessment:  :     Fall Risk Assessment, last 12 mths 6/21/2018   Able to walk? No       Abuse Screening:  :     Abuse Screening Questionnaire 7/11/2018 8/3/2017   Do you ever feel afraid of your partner? N N   Are you in a relationship with someone who physically or mentally threatens you? N N   Is it safe for you to go home?  Y Y       ADL Screening:  :     ADL Assessment 8/3/2017   Feeding yourself No Help Needed   Getting from bed to chair Help Needed   Getting dressed Help Needed   Bathing or showering Help Needed   Walk across the room (includes cane/walker) Help Needed   Using the telphone No Help Needed   Taking your medications Help Needed   Preparing meals Help Needed   Managing money (expenses/bills) No Help Needed   Moderately strenuous housework (laundry) Help Needed   Shopping for personal items (toiletries/medicines) Help Needed   Shopping for groceries Help Needed   Driving Help Needed   Climbing a flight of stairs Help Needed   Getting to places beyond walking distances Help Needed

## 2018-08-03 NOTE — MR AVS SNAPSHOT
[facility] 
 
 
 [address] [facility] 
[address] [place] [address] 
[phone] Patient: Sapna Fontana MRN:  [id number] Visit Information Date & Time Provider Department Dept. Phone Encounter #  
 8/3/2018 12:00 PM DO Brandon WolfejoyceBriannaNithya [phone] [id number] Follow-up Instructions Return in about 2 months (around 10/3/2018) for referral follow up, leg swelling. Your Appointments 2018  2:20 PM  
Follow Up with Jennifer Tobias MD  
[facility]) Appt Note: 6 month f/u memory loss NN 18; f/u memory loss r/s from 2018 [address] [place] [id number]  
[phone]  
  
   
 [address]  [address]  
  
    
 2019 10:15 AM  
ESTABLISHED PATIENT with Geetha Avina MD  
[facility]) Appt Note: 6 MO. F/U FOLLOWING ECHO  
 [address] [facility] [address] [place] [address]  
[phone] [address] Upcoming Health Maintenance Date Due Influenza Age 5 to Adult 2019* MEDICARE YEARLY EXAM 2018 GLAUCOMA SCREENING Q2Y 2020 DTaP/Tdap/Td series (2 - Td) 2026 *Topic was postponed. The date shown is not the original due date. Allergies as of 8/3/2018  Review Complete On: 8/3/2018 By: Mellissa Sauceda Severity Noted Reaction Type Reactions Aristocort [Triamcinolone] High 1970    Hives Aristocort shot Codeine High 08/10/2009    Itching Lasix [Furosemide] High 08/10/2009    Itching Bupropion  2011    Other (comments) Severe headache Darvocet A500 [Propoxyphene N-acetaminophen]  2010    Other (comments)  
 headache Influenza Virus Vaccine Whole  2009    Other (comments) pharyngitis Mevacor [Lovastatin]  08/10/2009    Other (comments)  
 myalgias Percocet [Oxycodone-acetaminophen]  07/15/2011    Itching Worse headache Rhinocort  08/10/2009    Other (comments)  
 nosebleed Simvastatin  08/10/2009    Other (comments)  
 myalgias Current Immunizations  Reviewed on 4/5/2018 Name Date Influenza Vaccine Split  Deferred (Contraindication) TB Skin Test (PPD) Intradermal 8/5/2013 TD Vaccine 3/28/2008 ZZZ-RETIRED (DO NOT USE) Pneumococcal Vaccine (Unspecified Type)  Deferred (Patient Refused) Not reviewed this visit You Were Diagnosed With   
  
 Codes Comments Chronic venous insufficiency    -  Primary ICD-10-CM: N19.4 ICD-9-CM: 459.81 Essential hypertension     ICD-10-CM: I10 
ICD-9-CM: 401.9 Lymphedema     ICD-10-CM: I89.0 ICD-9-CM: 649.3 Discoloration of skin of hand     ICD-10-CM: L81.9 ICD-9-CM: 709.00 Senile dementia of Alzheimer's type     ICD-10-CM: G30.1, F02.80 ICD-9-CM: 331.0, 294.10 Seasonal allergic rhinitis due to other allergic trigger     ICD-10-CM: J30.89 ICD-9-CM: 477.8 Hyperglycemia     ICD-10-CM: R73.9 ICD-9-CM: 790.29 Wheelchair dependent     ICD-10-CM: Z99.3 ICD-9-CM: V46.3 Coronary artery disease involving native coronary artery of native heart without angina pectoris     ICD-10-CM: I25.10 ICD-9-CM: 414.01 Statin intolerance     ICD-10-CM: Z78.9 ICD-9-CM: 995.27 Esotropia     ICD-10-CM: H50.00 ICD-9-CM: 378.00 Lower extremity venous stasis     ICD-10-CM: I87.8 ICD-9-CM: 459.81 Spinal stenosis of lumbar region, unspecified whether neurogenic claudication present     ICD-10-CM: M48.061 
ICD-9-CM: 724.02 Age-related osteoporosis without current pathological fracture     ICD-10-CM: M81.0 ICD-9-CM: 733.01 Discoloration of skin of lower leg     ICD-10-CM: L81.9 ICD-9-CM: 709.00 BL due to chronic edema vs other Other dysphagia     ICD-10-CM: R13.19 ICD-9-CM: 787.29 due to tonsilloliths due to PND from allergies vs other Heel pain, bilateral     ICD-10-CM: M79.671, Z94.675 ICD-9-CM: 729.5 at night due to ?ulcers from swelling and friction vs other Vitals BP Pulse Temp Resp Height(growth percentile) SpO2  
 114/65 (BP 1 Location: Left arm, BP Patient Position: Sitting) 84 97.9 °F (36.6 °C) (Oral) 18 5' 1\" (1.549 m) 93% OB Status Smoking Status Postmenopausal Former Smoker Preferred Pharmacy Pharmacy Name Phone [id number] [place] - [address] [phone] Your Updated Medication List  
  
   
This list is accurate as of 8/3/18  1:23 PM.  Always use your most recent med list.  
  
  
  
  
 acetaminophen 325 mg tablet Commonly known as:  TYLENOL Take  by mouth every four (4) hours as needed for Pain. amLODIPine 2.5 mg tablet Commonly known as:  Izetta Harder Take 1 tablet by mouth daily. aspirin 81 mg chewable tablet Take 1 Tab by mouth daily. Indications: prevention of cerebrovascular accident  
  
 bumetanide 1 mg tablet Commonly known as:  Sin Song TAKE 1 TABLET BY MOUTH EVERY DAY  
  
 BUTALBITAL COMPOUND-CODEINE 84--40 mg per capsule Generic drug:  codeine-butalbital-aspirin-caffeine Take  by mouth every six (6) hours as needed for Pain. fluticasone 50 mcg/actuation nasal spray Commonly known as:  Elinelie Caller 2 Sprays by Both Nostrils route daily. Indications: Allergic Rhinitis  
  
 lisinopril 5 mg tablet Commonly known as:  Noonan Heber Take 1 tablet by mouth daily. loratadine 10 mg tablet Commonly known as:  Kathlyne Old Take 1 Tab by mouth daily. mirtazapine 15 mg disintegrating tablet Commonly known as:  REMERON SOL-TAB  
  
 montelukast 10 mg tablet Commonly known as:  SINGULAIR  
  
 nicotinic acid 500 mg tablet Commonly known as:  NIACIN  
 Take 500 mg by mouth two (2) times daily (with meals). Indications: hypertriglyceridemia  
  
 ondansetron 8 mg disintegrating tablet Commonly known as:  ZOFRAN ODT Take 8 mg by mouth every eight (8) hours as needed. OTHER Please dispense an Ultra light Wheelchair DX.severe osteoarthritis, fibromyalgia, Bell's Palsy, DJD,muscle weakness, lumbar stenosis, knee and shoulder pain  
  
 pantoprazole 40 mg tablet Commonly known as:  PROTONIX  
  
 potassium chloride SR 10 mEq tablet Commonly known as:  KLOR-CON 10 Take  by mouth daily. sertraline 50 mg tablet Commonly known as:  ZOLOFT  
  
 traMADol 50 mg tablet Commonly known as:  ULTRAM  
Take 50 mg by mouth every eight (8) hours as needed for Pain. VENTOLIN HFA 90 mcg/actuation inhaler Generic drug:  albuterol Take 2 Puffs by inhalation every six (6) hours as needed for Wheezing. VITAMIN D3 1,000 unit Cap Generic drug:  cholecalciferol Take  by mouth daily. We Performed the Following REFERRAL TO DERMATOLOGY [REF19 Custom] Comments:  
 BL hand discoloration, BL leg discoloration REFERRAL TO LYMPHEDEMA CLINIC [YWX270 Custom] Comments:  
 BL legs and feet Follow-up Instructions Return in about 2 months (around 10/3/2018) for referral follow up, leg swelling. Referral Information Referral ID Referred By Referred To  
  
 [id number] Keesha MAGALLON Not Available Visits Status Start Date End Date 1 New Request 8/3/18 8/3/19 If your referral has a status of pending review or denied, additional information will be sent to support the outcome of this decision. Referral ID Referred By Referred To  
 [id number] Jimena Lyons [address] [facility] [address] Phone: [phone] Fax: [phone] Visits Status Start Date End Date 1 New Request 8/3/18 8/3/19 If your referral has a status of pending review or denied, additional information will be sent to support the outcome of this decision. Patient Instructions Leg and Ankle Edema: Care Instructions Your Care Instructions Swelling in the legs, ankles, and feet is called edema. It is common after you sit or stand for a while. Long plane flights or car rides often cause swelling in the legs and feet. You may also have swelling if you have to stand for long periods of time at your job. Problems with the veins in the legs (varicose veins) and changes in hormones can also cause swelling. Sometimes the swelling in the ankles and feet is caused by a more serious problem, such as heart failure, infection, blood clots, or liver or kidney disease. Follow-up care is a key part of your treatment and safety. Be sure to make and go to all appointments, and call your doctor if you are having problems. It's also a good idea to know your test results and keep a list of the medicines you take. How can you care for yourself at home? · If your doctor gave you medicine, take it as prescribed. Call your doctor if you think you are having a problem with your medicine. · Whenever you are resting, raise your legs up. Try to keep the swollen area higher than the level of your heart. · Take breaks from standing or sitting in one position. ¨ Walk around to increase the blood flow in your lower legs. ¨ Move your feet and ankles often while you stand, or tighten and relax your leg muscles. · Wear support stockings. Put them on in the morning, before swelling gets worse. · Eat a balanced diet. Lose weight if you need to. · Limit the amount of salt (sodium) in your diet. Salt holds fluid in the body and may increase swelling. When should you call for help? Call 911 anytime you think you may need emergency care.  For example, call if: 
  · You have symptoms of a blood clot in your lung (called a pulmonary embolism). These may include: 
¨ Sudden chest pain. ¨ Trouble breathing. ¨ Coughing up blood.  
 Call your doctor now or seek immediate medical care if: 
  · You have signs of a blood clot, such as: 
¨ Pain in your calf, back of the knee, thigh, or groin. ¨ Redness and swelling in your leg or groin.  
  · You have symptoms of infection, such as: 
¨ Increased pain, swelling, warmth, or redness. ¨ Red streaks or pus. ¨ A fever.  
 Watch closely for changes in your health, and be sure to contact your doctor if: 
  · Your swelling is getting worse.  
  · You have new or worsening pain in your legs.  
  · You do not get better as expected. Where can you learn more? Go to http://luigi-atif.info/. Enter J833 in the search box to learn more about \"Leg and Ankle Edema: Care Instructions. \" Current as of: November 20, 2017 Content Version: 11.7 © 4656-9933 Idea.me. Care instructions adapted under license by Trovali (which disclaims liability or warranty for this information). If you have questions about a medical condition or this instruction, always ask your healthcare professional. [place] [address] any warranty or liability for your use of this information. Managing Your Allergies: Care Instructions Your Care Instructions Managing your allergies is an important part of staying healthy. Your doctor will help you find out what may be causing the allergies. Common causes of allergy symptoms are house dust and dust mites, animal dander, mold, and pollen. As soon as you know what triggers your symptoms, try to reduce your exposure to your triggers. This can help prevent allergy symptoms, asthma, and other health problems. Ask your doctor about allergy medicine or immunotherapy. These treatments may help reduce or prevent allergy symptoms. Follow-up care is a key part of your treatment and safety.  Be sure to make and go to all appointments, and call your doctor if you are having problems. It's also a good idea to know your test results and keep a list of the medicines you take. How can you care for yourself at home? · If you think that dust or dust mites are causing your allergies: 
¨ Wash sheets, pillowcases, and other bedding every week in hot water. ¨ Use airtight, dust-proof covers for pillows, duvets, and mattresses. Avoid plastic covers, because they tend to tear quickly and do not \"breathe. \" Wash according to the instructions. ¨ Remove extra blankets and pillows that you don't need. ¨ Use blankets that are machine-washable. ¨ Don't use home humidifiers. They can help mites live longer. · Use air-conditioning. Change or clean all filters every month. Keep windows closed. Use high-efficiency air filters. Don't use window or attic fans, which draw dust into the air. · If you're allergic to pet dander, keep pets outside or, at the very least, out of your bedroom. Old carpet and cloth-covered furniture can hold a lot of animal dander. You may need to replace them. · Look for signs of cockroaches. Use cockroach baits to get rid of them. Then clean your home well. · If you're allergic to mold, don't keep indoor plants, because molds can grow in soil. Get rid of furniture, rugs, and drapes that smell musty. Check for mold in the bathroom. · If you're allergic to pollen, stay inside when pollen counts are high. · Don't smoke or let anyone else smoke in your house. Don't use fireplaces or wood-burning stoves. Avoid paint fumes, perfumes, and other strong odors. When should you call for help? Give an epinephrine shot if: 
  · You think you are having a severe allergic reaction.  
 After giving an epinephrine shot call 911, even if you feel better. 
 Call 911 if: 
  · You have symptoms of a severe allergic reaction. These may include: 
¨ Sudden raised, red areas (hives) all over your body. ¨ Swelling of the throat, mouth, lips, or tongue. ¨ Trouble breathing. ¨ Passing out (losing consciousness). Or you may feel very lightheaded or suddenly feel weak, confused, or restless.  
  · You have been given an epinephrine shot, even if you feel better.  
 Call your doctor now or seek immediate medical care if: 
  · You have symptoms of an allergic reaction, such as: ¨ A rash or hives (raised, red areas on the skin). ¨ Itching. ¨ Swelling. ¨ Belly pain, nausea, or vomiting.  
 Watch closely for changes in your health, and be sure to contact your doctor if: 
  · Your allergies get worse.  
  · You need help controlling your allergies.  
  · You have questions about allergy testing.  
  · You do not get better as expected. Where can you learn more? Go to http://luigi-atif.info/. Enter L249 in the search box to learn more about \"Managing Your Allergies: Care Instructions. \" Current as of: Nannette 10, 2017 Content Version: 11.7 © 2088-7756 WebSafety. Care instructions adapted under license by OneWire (which disclaims liability or warranty for this information). If you have questions about a medical condition or this instruction, always ask your healthcare professional. [address] any warranty or liability for your use of this information. Advise patient to start taking Over The Counter allergy medication (Allegra, Zyrtec, Claritin, Xyzal or Alavert) daily. If you have itchy, watery eyes you can try OTC Zaditor or Zyrtec Allergy Eye drops. If you have a stuffy nose, please try OTC Flonase, Flonase Sensimist, Rhinocort, or Nasacort AQ 2 squirts up each nostril once a day (adults) or 1 squirt up each nostril once a day (children). Use allergy free, dye free, fragrance free products on your body and hair.   After being outdoors, brush hair vigorously, wash face and arms, rinse nostrils with a nasal saline spray and consider changing your clothing. Dust furniture frequently and wear a mask while doing it. Vacuum floors weekly. Remove stuffed animals or extra pillows from the bed. Clean bedding in hot water weekly. Sometimes allergies can be so severe that 2 nasal sprays and 2 pills may be needed to control symptoms. Managing Your Allergies: Care Instructions Your Care Instructions Managing your allergies is an important part of staying healthy. Your doctor will help you find out what may be causing the allergies. Common causes of allergy symptoms are house dust and dust mites, animal dander, mold, and pollen. As soon as you know what triggers your symptoms, try to reduce your exposure to your triggers. This can help prevent allergy symptoms, asthma, and other health problems. Ask your doctor about allergy medicine or immunotherapy. These treatments may help reduce or prevent allergy symptoms. Follow-up care is a key part of your treatment and safety. Be sure to make and go to all appointments, and call your doctor if you are having problems. It's also a good idea to know your test results and keep a list of the medicines you take. How can you care for yourself at home? · If you think that dust or dust mites are causing your allergies: 
¨ Wash sheets, pillowcases, and other bedding every week in hot water. ¨ Use airtight, dust-proof covers for pillows, duvets, and mattresses. Avoid plastic covers, because they tend to tear quickly and do not \"breathe. \" Wash according to the instructions. ¨ Remove extra blankets and pillows that you don't need. ¨ Use blankets that are machine-washable. ¨ Don't use home humidifiers. They can help mites live longer. · Use air-conditioning. Change or clean all filters every month. Keep windows closed. Use high-efficiency air filters. Don't use window or attic fans, which draw dust into the air. · If you're allergic to pet dander, keep pets outside or, at the very least, out of your bedroom. Old carpet and cloth-covered furniture can hold a lot of animal dander. You may need to replace them. · Look for signs of cockroaches. Use cockroach baits to get rid of them. Then clean your home well. · If you're allergic to mold, don't keep indoor plants, because molds can grow in soil. Get rid of furniture, rugs, and drapes that smell musty. Check for mold in the bathroom. · If you're allergic to pollen, stay inside when pollen counts are high. · Don't smoke or let anyone else smoke in your house. Don't use fireplaces or wood-burning stoves. Avoid paint fumes, perfumes, and other strong odors. When should you call for help? Give an epinephrine shot if: 
  · You think you are having a severe allergic reaction.  
 After giving an epinephrine shot call 911, even if you feel better. 
 Call 911 if: 
  · You have symptoms of a severe allergic reaction. These may include: 
¨ Sudden raised, red areas (hives) all over your body. ¨ Swelling of the throat, mouth, lips, or tongue. ¨ Trouble breathing. ¨ Passing out (losing consciousness). Or you may feel very lightheaded or suddenly feel weak, confused, or restless.  
  · You have been given an epinephrine shot, even if you feel better.  
 Call your doctor now or seek immediate medical care if: 
  · You have symptoms of an allergic reaction, such as: ¨ A rash or hives (raised, red areas on the skin). ¨ Itching. ¨ Swelling. ¨ Belly pain, nausea, or vomiting.  
 Watch closely for changes in your health, and be sure to contact your doctor if: 
  · Your allergies get worse.  
  · You need help controlling your allergies.  
  · You have questions about allergy testing.  
  · You do not get better as expected. Where can you learn more? Go to http://luigi-atif.info/.  
Enter L249 in the search box to learn more about \"Managing Your Allergies: Care Instructions. \" Current as of: Nannette 10, 2017 Content Version: 11.7 © 1584-9693 Activehours. Care instructions adapted under license by Bit[employer]i (which disclaims liability or warranty for this information). If you have questions about a medical condition or this instruction, always ask your healthcare professional. [address] any warranty or liability for your use of this information. Introducing [facility]! Renetta Leroy introduces KissMyAds patient portal. Now you can access parts of your medical record, email your doctor's office, and request medication refills online. 1. In your internet browser, go to [email or web address] 2. Click on the First Time User? Click Here link in the Sign In box. You will see the New Member Sign Up page. 3. Enter your KissMyAds Access Code exactly as it appears below. You will not need to use this code after youve completed the sign-up process. If you do not sign up before the expiration date, you must request a new code. · KissMyAds Access Code: [email or web address] Expires: 10/9/2018 11:11 AM 
 
4. Enter the last four digits of your Social Security Number (xxxx) and Date of Birth (mm/dd/yyyy) as indicated and click Submit. You will be taken to the next sign-up page. 5. Create a KissMyAds ID. This will be your KissMyAds login ID and cannot be changed, so think of one that is secure and easy to remember. 6. Create a KissMyAds password. You can change your password at any time. 7. Enter your Password Reset Question and Answer. This can be used at a later time if you forget your password. 8. Enter your e-mail address. You will receive e-mail notification when new information is available in [address] 9. Click Sign Up. You can now view and download portions of your medical record. 10. Click the Download Summary menu link to download a portable copy of your medical information. If you have questions, please visit the Frequently Asked Questions section of the [facility] website. Remember, ncyclo is NOT to be used for urgent needs. For medical emergencies, dial 911. Now available from your iPhone and Android! Please provide this summary of care documentation to your next provider. Your primary care clinician is listed as Marybeth Moody. If you have any questions after today's visit, please call [phone].

## 2018-08-03 NOTE — PROGRESS NOTES
HISTORY OF PRESENT ILLNESS  Sapna Fontana is a 76 y.o. female presents with Ankle swelling (left ankle worse than right ankle; skin on ankle is dark. ); Foot Swelling (Top of feet; not able to wear her shoes; toes are dark in color); Hand Swelling (Fingers are dark in color; has noticed skin darker in color on her arms and wrists. ); Dysphagia; Referral Follow Up; and Results    Agree with nurse note. Pleasantly demented pt lives in [facility] and presents to our office by herself today. She has hypertension, hypercholesteremia, vit d deficiency, hypokalemia, elevated LFTs, statin intolerance and chronic venous insufficiency, insulin resistance, hyperglycemia, esotropia, spinal stenosis of lumbar region,  and osteoporosis presents to the office with a BP of 114/65. She is wheelchair bound and has chronic LE edema, but foot and ankle swelling has worsened to the point that she cannot put shoes on. She has chronic BL ankle and leg skin darkening. She also complains of heel pain at night. She does not add salt to her food and notes her assisted living facility does not cook with much salt. She has been doing pedal pump exercises regularly. She admits she does not elevate her feet. She does not have compression hose. She has been taking Bumex 1 mg daily without relief of bilateral feet and ankle swelling. She denies any trauma to her feet. She denies chest pain, SOB, or dry cough. This is a chronic concern of hers, and she has been given multiple referrals to lymphedema clinic placed on 4/5/2018 and 6/21/2018 and has not gone. She was referred to cardio and saw Dr. Ivette Austin on 7/11/2018. She had upper R chest and shoulder pain and knots in her legs. EKG NSR at 77 BPM. He ordered an echo cardiogram and said she is stable from a cardiac perspective. He thinks swelling is due to venous insufficiency and recommended she f/u with lymphedema clinic. She has not gone.     She reports bilateral hand darkness that is extending into her arms, as she did at her last ov 4/5/18. She was wringing her hands the entire visit. Pt sts she has had hand darkness before, but not this dark. No other associated symptoms including pain and swelling. Pt was dx'd with NORMA in 2007 by Dr. Lory Duffy. Pt does not recall having sleep apnea. She feels a knot in her throat when swallowing. She has been having a stuffy nose and PND due to allergies. Written by denisse Vega, as dictated by Dr. Deepa Rothman DO.    ALIZE    Review of Systems negative except as noted above in HPI. ALLERGIES:    Allergies   Allergen Reactions    Aristocort [Triamcinolone] Hives     Aristocort shot    Codeine Itching    Lasix [Furosemide] Itching    Bupropion Other (comments)     Severe headache    Darvocet A500 [Propoxyphene N-Acetaminophen] Other (comments)     headache    Influenza Virus Vaccine Whole Other (comments)     pharyngitis    Mevacor [Lovastatin] Other (comments)     myalgias    Percocet [Oxycodone-Acetaminophen] Itching     Worse headache    Rhinocort Other (comments)     nosebleed    Simvastatin Other (comments)     myalgias       CURRENT MEDICATIONS:        PAST MEDICAL HISTORY:    Past Medical History:   Diagnosis Date    Abnormal cardiovascular stress test 04/18/08    Dr. Cárdenas Simpler    Allergy, unspecified not elsewhere classified     Bell's palsy 02/2005    Left. Dr. Kinza MALDONADO (coronary artery disease)     Dr. Kaylyn Yuen. Dr. Bautista January Chest pain 2002    Dr. Savage Candelario Chronic headache 2013    due to depression. Dr. Gema Jiménez.  Chronic pain     LEGS AND FEET; BACK UNDER RIGHT SHOULDER    Congenital eye disorder     Lazy eyes. Dr. Kinza Cuevas.  Depression 2011    Dr. Claudene Mc (psychologist). Dr. Gema Jiménez. Dr. Tammy Montalvo.  DJD (degenerative joint disease)     knees, low back. Dr. Leslie Barry. Dr. Neeraj Orozco. Dr. Sera Dominguez Gait instability 02/10/09    Dr. Ghada Suh. Dr. Gisella Pastrana.  Gallstones     GERD (gastroesophageal reflux disease)     Hiatal hernia 1977    Hypercholesteremia     Hypertension     Ill-defined condition     \"JUST ONE NIGHT I WENT TO STAND UP AND I JUST COULDN'T STAND\"  PT NOT SURE WHY    Leg weakness 12/2009    Dr. Evangelina Peng. Dr. Augustine Gaxiola.  Lower extremity venous stasis     Dr. Tabitha Syed    Lumbar spinal stenosis 12/2009    L3-5. Dr. Thornton Never. PARAPLEGIA SINCE 2012    Lymphedema 12/19/08    Dr. Opal Sadler then Dr. Harrison Torres Memory loss 2013    Dr. Francia Blank.  Menopause     Migraine     Ocular. Dr. Nguyen Moreau    NORMA (obstructive sleep apnea) 01/24/07    Dr. Mira Warner. NO CPAP    Osteoporosis 02/10/09    Dr. Leonora Rinne    Pancreatitis 03/2011    Post-menopausal bleeding 07/2016    due to Dr. Suzi Cooper Jr.    Rotator cuff injury     Left. Dr. Emily Ortiz; chronic bilateral    Seborrheic keratosis 2005    Dr. Marsh Spatz 02/05/09    Dr. Radha Hall   [facility] Tremor 2013    Dr. Gisella Pastrana.  Unspecified vitamin D deficiency 07/2009    Urinary incontinence 2013    Dr. Britt Hamilton Venous insufficiency 1998    R>L. with chronic leg edema. Dr. Elise Tran       PAST SURGICAL HISTORY:    Past Surgical History:   Procedure Laterality Date    HX HEART CATHETERIZATION  06/2008    Dr. Junior Gar. due to abnormal Stress Test    H[facility] MAR [facility] B[facility]  [facility]  07/27/2016    D AND C WITH SYMPHION, RESECTION OF ENDOMETRIAL TISSUE. benign. due to postmenopausal bleeding. Dr. Suyapa Nelson.        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 Pt lived with smoker dad x 40 yrs    Alcohol use No    Drug use: No    Sexual activity: Yes     Partners: Male     Birth control/ protection: Condom     Other Topics Concern    None     Social History Narrative    Lives in [facility]:    Immunization History   Administered Date(s) Administered    TB Skin Test (PPD) Intradermal 08/05/2013    TD Vaccine 03/28/2008         PHYSICAL EXAMINATION    Vital Signs    Visit Vitals    /65 (BP 1 Location: Left arm, BP Patient Position: Sitting)    Pulse 84    Temp 97.9 °F (36.6 °C) (Oral)    Resp 18    Ht 5' 1\" (1.549 m)    SpO2 93%       Weight Metrics 8/16/2018 8/3/2018 8/3/2018 7/11/2018 5/1/2018 4/5/2018 1/27/2018   Weight - - - 290 lb 290 lb - 290 lb   BMI - - - 54.8 kg/m2 54.8 kg/m2 - 54.8 kg/m2   Some encounter information is confidential and restricted. Go to Review Flowsheets activity to see all data. General appearance - Well nourished. Well appearing. Well developed. No acute distress. Obese. Presents in a wheel chair. Head - Normocephalic. Atraumatic. Eyes - pupils equal and reactive. Extraocular eye muscular laxity noted. Sclera anicteric. Mildly injected sclera. Ears - Hearing is grossly normal bilaterally. Nose - normal and patent. No polyps noted. No erythema. No discharge.     Mouth - mucous membranes with adequate moisture. Posterior pharynx normal with cobblestone appearance. No erythema, white exudate or obstruction. Neck - supple. Midline trachea. No carotid bruits noted bilaterally. No thyromegaly noted. Chest - clear to auscultation bilaterally anteriorly and posteriorly. No wheezes. No rales or rhonchi. Breath sounds are symmetrical bilaterally. Unlabored respirations. Heart - normal rate. Regular rhythm. Normal S1, S2. No murmur noted. No rubs, clicks or gallops noted. Abdomen - soft and distended. No masses or organomegaly. No rebound, rigidity or guarding. Bowel sounds normal x 4 quadrants. No tenderness noted. Neurological - awake, alert and oriented to person, place, and time and event. Cranial nerves II through XII intact. Clear speech. Muscle strength is +5/5 x 4 extremities. Sensation is intact to light touch bilaterally. Heme/Lymph - peripheral pulses normal x 2 extremities. Nonpitting edema in bilateral LE, worse at bilateral ankles and feet. Difficult to palpate pedal pulses due to edema. Musculoskeletal - Intact x 4 extremities. Full ROM x 2 extremities, decreased in BL LE.  BL knee and shoulder pain with movement. Back exam - normal range of motion. No pain on palpation of the spinous processes in the cervical, thoracic, lumbar, sacral regions. No CVA tenderness. Skin - no rashes, erythema, ecchymosis, lacerations, abrasions, suspicious moles noted. Hyperpigmented flat patches on digits extending across BL hands and wrist and on BL LE  Psychological -   normal behavior, dress and thought processes. occasional good insight. Good eye contact. Normal affect. Appropriate mood. Normal speech.       DATA REVIEWED    Lab Results   Component Value Date/Time    WBC 10.1 01/28/2018 12:20 AM    Hemoglobin (POC) 12.6 12/26/2009 09:27 PM    HGB 11.4 (L) 01/28/2018 12:20 AM    Hematocrit (POC) 37 12/26/2009 09:27 PM    HCT 37.2 01/28/2018 12:20 AM PLATELET 591 46/24/1019 12:20 AM    MCV 92.5 01/28/2018 12:20 AM     Lab Results   Component Value Date/Time    Sodium 140 01/28/2018 12:20 AM    Potassium 3.1 (L) 01/28/2018 12:20 AM    Chloride 102 01/28/2018 12:20 AM    CO2 30 01/28/2018 12:20 AM    Anion gap 8 01/28/2018 12:20 AM    Glucose 193 (H) 01/28/2018 12:20 AM    Glucose 78 08/14/2013 06:00 AM    BUN 14 01/28/2018 12:20 AM    Creatinine 0.96 01/28/2018 12:20 AM    BUN/Creatinine ratio 15 01/28/2018 12:20 AM    GFR est AA >60 01/28/2018 12:20 AM    GFR est non-AA 56 (L) 01/28/2018 12:20 AM    Calcium 8.5 01/28/2018 12:20 AM    Bilirubin, total 0.4 01/28/2018 12:20 AM    AST (SGOT) 107 (H) 01/28/2018 12:20 AM    Alk. phosphatase 110 01/28/2018 12:20 AM    Protein, total 6.9 01/28/2018 12:20 AM    Albumin 3.1 (L) 01/28/2018 12:20 AM    Globulin 3.8 01/28/2018 12:20 AM    A-G Ratio 0.8 (L) 01/28/2018 12:20 AM    ALT (SGPT) 42 01/28/2018 12:20 AM     Lab Results   Component Value Date/Time    Cholesterol, total 244 (H) 12/05/2017 12:21 PM    HDL Cholesterol 51 12/05/2017 12:21 PM    LDL, calculated 153 (H) 12/05/2017 12:21 PM    VLDL, calculated 40 12/05/2017 12:21 PM    Triglyceride 199 (H) 12/05/2017 12:21 PM    CHOL/HDL Ratio 4.6 08/14/2013 06:00 AM     Lab Results   Component Value Date/Time    Vitamin D 25-Hydroxy 24 (L) 08/05/2010 03:54 PM    VITAMIN D, 25-HYDROXY 34.3 12/05/2017 12:21 PM       Lab Results   Component Value Date/Time    Hemoglobin A1c 6.2 (H) 12/05/2017 12:21 PM    Hemoglobin A1c (POC) 5.6 08/03/2018 02:15 PM    Hemoglobin A1c, External 6.2 06/11/2015     Lab Results   Component Value Date/Time    TSH 1.440 12/05/2017 12:21 PM       Lab Results   Component Value Date/Time    Microalb/Creat ratio (ug/mg creat.) 6.8 01/20/2014 10:22 AM         ASSESSMENT and PLAN      ICD-10-CM ICD-9-CM    1. Chronic venous insufficiency I87.2 459.81 REFERRAL TO LYMPHEDEMA CLINIC   2.  Essential hypertension I10 401.9 LIPID PANEL      METABOLIC PANEL, COMPREHENSIVE      TSH 3RD GENERATION      MICROALBUMIN, UR, RAND W/ MICROALB/CREAT RATIO      URINALYSIS W/ RFLX MICROSCOPIC   3. Lymphedema I89.0 457.1 REFERRAL TO LYMPHEDEMA CLINIC   4. Diuretic-induced hypokalemia E87.6 276.8     T50.2X5A E944.4    5. Insulin resistance E88.81 277.7 AMB POC HEMOGLOBIN A1C   6. Discoloration of skin of hand L81.9 709.00 REFERRAL TO LYMPHEDEMA CLINIC      REFERRAL TO DERMATOLOGY   7. Other specified nutritional anemias D53.8 281.8 CBC W/O DIFF   8. Senile dementia of Alzheimer's type G30.1 331.0     F02.80 294.10    9. Seasonal allergic rhinitis due to other allergic trigger J30.89 477.8    10. Hyperglycemia R73.9 790.29 AMB POC HEMOGLOBIN J8I      METABOLIC PANEL, COMPREHENSIVE   11. Wheelchair dependent Z99.3 V46.3    12. Coronary artery disease involving native coronary artery of native heart without angina pectoris I25.10 414.01    13. Statin intolerance Z78.9 995.27    14. Esotropia H50.00 378.00    15. Lower extremity venous stasis I87.8 459.81    16. Spinal stenosis of lumbar region, unspecified whether neurogenic claudication present M48.061 724.02    17. Age-related osteoporosis without current pathological fracture M81.0 733.01    18. Discoloration of skin of lower leg L81.9 709.00 REFERRAL TO DERMATOLOGY    BL due to chronic edema vs other   19. Other dysphagia R13.19 787.29     due to tonsilloliths due to PND from allergies vs other   20. Heel pain, bilateral M79.671 729.5 REFERRAL TO LYMPHEDEMA CLINIC    M79.672      at night due to ?ulcers from swelling and friction vs other   21. Hypercholesteremia E78.00 272.0    22. Elevated liver enzymes W21.8 457.2 METABOLIC PANEL, COMPREHENSIVE   23. Vitamin D deficiency E55.9 268.9 VITAMIN D, 25 HYDROXY       Chart reviewed and updated. I had the nurse call lymphedema clinic directly today so patient can arrange travel with [facility]. Nurse will call pt's assisted living facilities to receive list of current medication. Continue current medications and care. Use a low salt diet, elevate legs, and complete leg pump exercises. Recheck pertinent labs. Recent office visit notes from Dr. Dunia Chou reviewed. Referrals given; patient urged to keep appointments with specialists. Derm. Reordered referrals for lymphedema clinic. Counseled patient on health concerns:  Skin darkening, swelling, hyperglycemia, dysphagia, heel pain, allergies. Relevant handouts given and discussed with patient. Immunizations noted. Offered empathy, support, legitimation, prayers, partnership to patient. Praised patient for progress. Follow-up Disposition:  Return in about 2 months (around 10/3/2018) for referral follow up, leg swelling. Patient was offered a choice/choices in the treatment plan today. Patient expresses understanding of the plan and agrees with recommendations. Written by denisse Anne, as dictated by Dr. Harmeet Horner DO. Documentation True and Accepted by Cabrera Delong. Patient Instructions          Leg and Ankle Edema: Care Instructions  Your Care Instructions  Swelling in the legs, ankles, and feet is called edema. It is common after you sit or stand for a while. Long plane flights or car rides often cause swelling in the legs and feet. You may also have swelling if you have to stand for long periods of time at your job. Problems with the veins in the legs (varicose veins) and changes in hormones can also cause swelling. Sometimes the swelling in the ankles and feet is caused by a more serious problem, such as heart failure, infection, blood clots, or liver or kidney disease. Follow-up care is a key part of your treatment and safety. Be sure to make and go to all appointments, and call your doctor if you are having problems. It's also a good idea to know your test results and keep a list of the medicines you take. How can you care for yourself at home?   · If your doctor gave you medicine, take it as prescribed. Call your doctor if you think you are having a problem with your medicine. · Whenever you are resting, raise your legs up. Try to keep the swollen area higher than the level of your heart. · Take breaks from standing or sitting in one position. ¨ Walk around to increase the blood flow in your lower legs. ¨ Move your feet and ankles often while you stand, or tighten and relax your leg muscles. · Wear support stockings. Put them on in the morning, before swelling gets worse. · Eat a balanced diet. Lose weight if you need to. · Limit the amount of salt (sodium) in your diet. Salt holds fluid in the body and may increase swelling. When should you call for help? Call 911 anytime you think you may need emergency care. For example, call if:    · You have symptoms of a blood clot in your lung (called a pulmonary embolism). These may include:  ¨ Sudden chest pain. ¨ Trouble breathing. ¨ Coughing up blood.    Call your doctor now or seek immediate medical care if:    · You have signs of a blood clot, such as:  ¨ Pain in your calf, back of the knee, thigh, or groin. ¨ Redness and swelling in your leg or groin.     · You have symptoms of infection, such as:  ¨ Increased pain, swelling, warmth, or redness. ¨ Red streaks or pus. ¨ A fever.    Watch closely for changes in your health, and be sure to contact your doctor if:    · Your swelling is getting worse.     · You have new or worsening pain in your legs.     · You do not get better as expected. Where can you learn more? Go to http://luigi-atif.info/. Enter A963 in the search box to learn more about \"Leg and Ankle Edema: Care Instructions. \"  Current as of: November 20, 2017  Content Version: 11.7  © 6625-5061 [employer]. Care instructions adapted under license by [employer] (which disclaims liability or warranty for this information).  If you have questions about a medical condition or this instruction, always ask your healthcare professional. [address] any warranty or liability for your use of this information. Managing Your Allergies: Care Instructions  Your Care Instructions    Managing your allergies is an important part of staying healthy. Your doctor will help you find out what may be causing the allergies. Common causes of allergy symptoms are house dust and dust mites, animal dander, mold, and pollen. As soon as you know what triggers your symptoms, try to reduce your exposure to your triggers. This can help prevent allergy symptoms, asthma, and other health problems. Ask your doctor about allergy medicine or immunotherapy. These treatments may help reduce or prevent allergy symptoms. Follow-up care is a key part of your treatment and safety. Be sure to make and go to all appointments, and call your doctor if you are having problems. It's also a good idea to know your test results and keep a list of the medicines you take. How can you care for yourself at home? · If you think that dust or dust mites are causing your allergies:  ¨ Wash sheets, pillowcases, and other bedding every week in hot water. ¨ Use airtight, dust-proof covers for pillows, duvets, and mattresses. Avoid plastic covers, because they tend to tear quickly and do not \"breathe. \" Wash according to the instructions. ¨ Remove extra blankets and pillows that you don't need. ¨ Use blankets that are machine-washable. ¨ Don't use home humidifiers. They can help mites live longer. · Use air-conditioning. Change or clean all filters every month. Keep windows closed. Use high-efficiency air filters. Don't use window or attic fans, which draw dust into the air. · If you're allergic to pet dander, keep pets outside or, at the very least, out of your bedroom. Old carpet and cloth-covered furniture can hold a lot of animal dander. You may need to replace them.   · Look for signs of cockroaches. Use cockroach baits to get rid of them. Then clean your home well. · If you're allergic to mold, don't keep indoor plants, because molds can grow in soil. Get rid of furniture, rugs, and drapes that smell musty. Check for mold in the bathroom. · If you're allergic to pollen, stay inside when pollen counts are high. · Don't smoke or let anyone else smoke in your house. Don't use fireplaces or wood-burning stoves. Avoid paint fumes, perfumes, and other strong odors. When should you call for help? Give an epinephrine shot if:    · You think you are having a severe allergic reaction.    After giving an epinephrine shot call 911, even if you feel better.   Call 911 if:    · You have symptoms of a severe allergic reaction. These may include:  ¨ Sudden raised, red areas (hives) all over your body. ¨ Swelling of the throat, mouth, lips, or tongue. ¨ Trouble breathing. ¨ Passing out (losing consciousness). Or you may feel very lightheaded or suddenly feel weak, confused, or restless.     · You have been given an epinephrine shot, even if you feel better.    Call your doctor now or seek immediate medical care if:    · You have symptoms of an allergic reaction, such as:  ¨ A rash or hives (raised, red areas on the skin). ¨ Itching. ¨ Swelling. ¨ Belly pain, nausea, or vomiting.    Watch closely for changes in your health, and be sure to contact your doctor if:    · Your allergies get worse.     · You need help controlling your allergies.     · You have questions about allergy testing.     · You do not get better as expected. Where can you learn more? Go to http://luigi-atif.info/. Enter L249 in the search box to learn more about \"Managing Your Allergies: Care Instructions. \"  Current as of: Nannette 10, 2017  Content Version: 11.7  © 7731-0499 [employer].  Care instructions adapted under license by [employer] (which disclaims liability or warranty for this information). If you have questions about a medical condition or this instruction, always ask your healthcare professional. [address] any warranty or liability for your use of this information. Advise patient to start taking Over The Counter allergy medication (Allegra, Zyrtec, Claritin, Xyzal or Alavert) daily. If you have itchy, watery eyes you can try OTC Zaditor or Zyrtec Allergy Eye drops. If you have a stuffy nose, please try OTC Flonase, Flonase Sensimist, Rhinocort, or Nasacort AQ 2 squirts up each nostril once a day (adults) or 1 squirt up each nostril once a day (children). Use allergy free, dye free, fragrance free products on your body and hair. After being outdoors, brush hair vigorously, wash face and arms, rinse nostrils with a nasal saline spray and consider changing your clothing. Dust furniture frequently and wear a mask while doing it. Vacuum floors weekly. Remove stuffed animals or extra pillows from the bed. Clean bedding in hot water weekly. Sometimes allergies can be so severe that 2 nasal sprays and 2 pills may be needed to control symptoms. Managing Your Allergies: Care Instructions  Your Care Instructions    Managing your allergies is an important part of staying healthy. Your doctor will help you find out what may be causing the allergies. Common causes of allergy symptoms are house dust and dust mites, animal dander, mold, and pollen. As soon as you know what triggers your symptoms, try to reduce your exposure to your triggers. This can help prevent allergy symptoms, asthma, and other health problems. Ask your doctor about allergy medicine or immunotherapy. These treatments may help reduce or prevent allergy symptoms. Follow-up care is a key part of your treatment and safety. Be sure to make and go to all appointments, and call your doctor if you are having problems.  It's also a good idea to know your test results and keep a list of the medicines you take. How can you care for yourself at home? · If you think that dust or dust mites are causing your allergies:  ¨ Wash sheets, pillowcases, and other bedding every week in hot water. ¨ Use airtight, dust-proof covers for pillows, duvets, and mattresses. Avoid plastic covers, because they tend to tear quickly and do not \"breathe. \" Wash according to the instructions. ¨ Remove extra blankets and pillows that you don't need. ¨ Use blankets that are machine-washable. ¨ Don't use home humidifiers. They can help mites live longer. · Use air-conditioning. Change or clean all filters every month. Keep windows closed. Use high-efficiency air filters. Don't use window or attic fans, which draw dust into the air. · If you're allergic to pet dander, keep pets outside or, at the very least, out of your bedroom. Old carpet and cloth-covered furniture can hold a lot of animal dander. You may need to replace them. · Look for signs of cockroaches. Use cockroach baits to get rid of them. Then clean your home well. · If you're allergic to mold, don't keep indoor plants, because molds can grow in soil. Get rid of furniture, rugs, and drapes that smell musty. Check for mold in the bathroom. · If you're allergic to pollen, stay inside when pollen counts are high. · Don't smoke or let anyone else smoke in your house. Don't use fireplaces or wood-burning stoves. Avoid paint fumes, perfumes, and other strong odors. When should you call for help? Give an epinephrine shot if:    · You think you are having a severe allergic reaction.    After giving an epinephrine shot call 911, even if you feel better.   Call 911 if:    · You have symptoms of a severe allergic reaction. These may include:  ¨ Sudden raised, red areas (hives) all over your body. ¨ Swelling of the throat, mouth, lips, or tongue. ¨ Trouble breathing. ¨ Passing out (losing consciousness).  Or you may feel very lightheaded or suddenly feel weak, confused, or restless.     · You have been given an epinephrine shot, even if you feel better.    Call your doctor now or seek immediate medical care if:    · You have symptoms of an allergic reaction, such as:  ¨ A rash or hives (raised, red areas on the skin). ¨ Itching. ¨ Swelling. ¨ Belly pain, nausea, or vomiting.    Watch closely for changes in your health, and be sure to contact your doctor if:    · Your allergies get worse.     · You need help controlling your allergies.     · You have questions about allergy testing.     · You do not get better as expected. Where can you learn more? Go to http://luigi-atif.info/. Enter L249 in the search box to learn more about \"Managing Your Allergies: Care Instructions. \"  Current as of: Nannette 10, 2017  Content Version: 11.7  © 6397-7676 [employer]. Care instructions adapted under license by [employer] (which disclaims liability or warranty for this information). If you have questions about a medical condition or this instruction, always ask your healthcare professional. [address] any warranty or liability for your use of this information.

## 2018-08-09 ENCOUNTER — TELEPHONE (OUTPATIENT)
Dept: NEUROLOGY | Age: 78
End: 2018-08-09

## 2018-08-16 ENCOUNTER — OFFICE VISIT (OUTPATIENT)
Dept: NEUROLOGY | Age: 78
End: 2018-08-16

## 2018-08-16 VITALS
OXYGEN SATURATION: 94 % | HEART RATE: 88 BPM | SYSTOLIC BLOOD PRESSURE: 122 MMHG | HEIGHT: 61 IN | DIASTOLIC BLOOD PRESSURE: 82 MMHG

## 2018-08-16 DIAGNOSIS — E53.8 VITAMIN B12 DEFICIENCY: ICD-10-CM

## 2018-08-16 DIAGNOSIS — R41.3 MEMORY LOSS: Primary | ICD-10-CM

## 2018-08-16 NOTE — PROGRESS NOTES
Neurology Progress Note      HISTORY PROVIDED BY: patient    Chief Complaint:   Chief Complaint   Patient presents with    Memory Loss    Neurologic Problem      Subjective:   Pt is a 76 y.o. right handed female last seen on 1/2/18 with various issues noted by her PCP - memory loss, HA, tremors, gait issues, and weakness, with patient only concerned about her memory, with diagnosis of dementia in 2015, but did well on limited MMSE at last visit, with h/o tremors in 2013 - pt currently denies tremors and none seen on exam, not concerned about her headaches that respond to tylenol, and known h/o lumbar spinal stenosis leaving her non-ambulatory and WC bound. MRI brain w/wo 9/19/11 for gait abnormality - age related atrophy, o/w unremarkable. Exam with diminished reflexes throughout, mild weakness in LE, WC bound, does well on limited MMSE. May have mild or early dementia, but unusual that her MMSE exam had improved over time and not declined significantly since 2013. May have had a pseudodementia related to mood disorder. Plan to monitor over time and hold off on starting dementia meds. Recommended completing memory labs, B12/MMA at next lab draw with PCP. She returns in f/u for memory loss. B12/MMA not done. She thought Dr. Carl Stiles told her to see me for the swelling in her ankles. It looks like at her 8/3/18 appt with PCP she was referred to the [facility]ymp[facility], she has not received a call from them yet. She asks why she is seeing me. C/o right posterior leg pain running down leg, has had this in the past and it resolved spontaneously, though may have also had PT for it. Not in PT currently due to reaching limit for insurance coverage. Past Medical History:   Diagnosis Date    Abnormal cardiovascular stress test 04/18/08    Dr. Schulz Shock    Allergy, unspecified not elsewhere classified     Bell's palsy 02/2005    Left.   Dr. Adela Mcgowan CAD (coronary artery disease)     Dr. Alexis Ast Jessica Gallardo. Dr. Coty Nguyen Chest pain 2002    Dr. Papito Li Chronic headache 2013    due to depression. Dr. Tatyana Rushing.  Chronic pain     LEGS AND FEET; BACK UNDER RIGHT SHOULDER    Congenital eye disorder     Lazy eyes. Dr. Augustin Mack.  Depression 2011    Dr. Gerald Mata (psychologist). Dr. Tatyana Rushing. Dr. Dora Humphrey.  DJD (degenerative joint disease)     knees, low back. Dr. Juliet Grossman. Dr. Benjamin Mclain. Dr. Kartik Griggs Gait instability 02/10/09    Dr. Chloé Villegas. Dr. Tatyana Rushing.  Gallstones     GERD (gastroesophageal reflux disease)     Hiatal hernia 1977    Hypercholesteremia     Hypertension     Ill-defined condition     \"JUST ONE NIGHT I WENT TO STAND UP AND I JUST COULDN'T STAND\"  PT NOT SURE WHY    Leg weakness 12/2009    Dr. Augie Mack. Dr. Toño Rodriguez.  Lower extremity venous stasis     Dr. Amanda Rojas    Lumbar spinal stenosis 12/2009    L3-5. Dr. Columba Carvalho. PARAPLEGIA SINCE 2012    Lymphedema 12/19/08    Dr. Tracy Conte then Dr. Herminio Singh Memory loss 2013    Dr. Sania Grimes.  Menopause     Migraine     Ocular. Dr. Raza Bush    NORMA (obstructive sleep apnea) 01/24/07    Dr. Brad Najera. NO CPAP    Osteoporosis 02/10/09    Dr. Governor Quezada    Pancreatitis 03/2011    Post-menopausal bleeding 07/2016    due to Dr. Bernabe Gutiérrez Jr.    Rotator cuff injury     Left. Dr. Aniya Mansfield; chronic bilateral    Seborrheic keratosis 2005    Dr. Janelle Vigil 02/05/09    Dr. Bruce Marie    [facility] Tremor 2013    Dr. Tatyana Rushing.  Unspecified vitamin D deficiency 07/2009    Urinary incontinence 2013    Dr. Chetan Khan Venous insufficiency 1998    R>L. with chronic leg edema. Dr. Rica Blank      Past Surgical History:   Procedure Laterality Date    HX HEART CATHETERIZATION  06/2008    Dr. Jeff Vera.   due to abnormal Stress Test    HX MAR AND BSO  09/14/2016    Umer Harris HYSTEROSCOPIC MYOMECTOMY  07/27/2016    D AND C WITH SYMPHION, RESECTION OF ENDOMETRIAL TISSUE. benign. due to postmenopausal bleeding. Dr. Sharda Overton. 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   Pt lived with smoker dad x 40 yrs    Alcohol use No    Drug use: No    Sexual activity: Yes     Partners: Male     Birth control/ protection: Condom     Other Topics Concern    Not on file     Social History Narrative    Lives in [facility] at [address] History   Problem Relation Age of Onset    Hypertension Mother     Arthritis-osteo Mother     Dementia Mother     Cancer Father      liver    Heart Attack Father     Other Father      kidney stones    Stroke Maternal Grandmother     Hypertension Sister     Arthritis-osteo Sister     Diabetes Paternal Aunt     Anesth Problems Neg Hx          Objective:   Review of Systems : Per HPI o/w limited due to pt factors      Allergies   Allergen Reactions    Aristocort [Triamcinolone] Hives     Aristocort shot    Codeine Itching    Lasix [Furosemide] Itching    Bupropion Other (comments)     Severe headache    Darvocet A500 [Propoxyphene N-Acetaminophen] Other (comments)     headache    Influenza Virus Vaccine Whole Other (comments)     pharyngitis    Mevacor [Lovastatin] Other (comments)     myalgias    Percocet [Oxycodone-Acetaminophen] Itching     Worse headache    Rhinocort Other (comments)     nosebleed    Simvastatin Other (comments)     myalgias        Meds:  Outpatient Medications Prior to Visit   Medication Sig Dispense Refill    fluticasone (FLONASE) 50 mcg/actuation nasal spray SPRAY 2 SPRAYS INTO BOTH NOSTRILS DAILY FOR ALLERGIC RHINITIS 16 g 5    ondansetron (ZOFRAN ODT) 8 mg disintegrating tablet Take 8 mg by mouth every eight (8) hours as needed.  cholecalciferol (VITAMIN D3) 1,000 unit cap Take  by mouth daily.  codeine-butalbital-aspirin-caffeine (BUTALBITAL COMPOUND-CODEINE) 68--40 mg per capsule Take  by mouth every six (6) hours as needed for Pain.  traMADol (ULTRAM) 50 mg tablet Take 50 mg by mouth every eight (8) hours as needed for Pain.  albuterol (VENTOLIN HFA) 90 mcg/actuation inhaler Take 2 Puffs by inhalation every six (6) hours as needed for Wheezing.  sertraline (ZOLOFT) 50 mg tablet       potassium chloride SR (KLOR-CON 10) 10 mEq tablet Take  by mouth daily.  pantoprazole (PROTONIX) 40 mg tablet       mirtazapine (REMERON SOL-TAB) 15 mg disintegrating tablet       acetaminophen (TYLENOL) 325 mg tablet Take  by mouth every four (4) hours as needed for Pain.  nicotinic acid (NIACIN) 500 mg tablet Take 500 mg by mouth two (2) times daily (with meals). Indications: hypertriglyceridemia      montelukast (SINGULAIR) 10 mg tablet       loratadine (CLARITIN) 10 mg tablet Take 1 Tab by mouth daily. 90 Tab 3    aspirin 81 mg chewable tablet Take 1 Tab by mouth daily. Indications: prevention of cerebrovascular accident 80 Tab 3    OTHER Please dispense an Ultra light Wheelchair DX.severe osteoarthritis, fibromyalgia, Bell's Palsy, DJD,muscle weakness, lumbar stenosis, knee and shoulder pain 1 Device 0    amlodipine (NORVASC) 2.5 mg tablet Take 1 tablet by mouth daily. 30 tablet 11    bumetanide (BUMEX) 1 mg tablet TAKE 1 TABLET BY MOUTH EVERY DAY 30 tablet 11    lisinopril (PRINIVIL, ZESTRIL) 5 mg tablet Take 1 tablet by mouth daily. 30 tablet 11     No facility-administered medications prior to visit. Imaging:  MRI Results (most recent):    Results from Hospital Encounter encounter on 09/19/11   MRI BRAIN W AND W/O CONTRAST   Narrative **Final Report**      ICD Codes / Adm. Diagnosis: 724.02  724.02 / SPINAL STENOSIS, LUMBAR REGION    Examination:  MRI BRAIN W AND WO CON  - [id number] - Sep 19 2011  3:58PM  Accession No:  [id number]  Reason:  724.02      REPORT:  INDICATION:  Abnormality of gait. COMPARISON:  CT head 01/13/2011. SEQUENCES:  T1 sagittal images, T2 axial images, flair axial images,   gradient echo axial images,  diffusion axial images,  T1 axial images pre   and post Magnevist contrast, and T1 coronal images post Magnevist contrast.    Multiple MRI images were obtained of the brain in the sagittal, in the axial   and in the coronal planes with the above parameters including images both   pre and post 18 mL of Magnevist contrast.    FINDINGS:  Atrophic change is present. Artifact on the diffusion images   with no restricted diffusion appreciated is seen. An empty sella is   present. No abnormal contrast enhancement or shift to the midline is   evident. Normal flow voids are present in the basilar and internal carotid   arteries. The paranasal sinuses and mastoid air cells are clear. IMPRESSION:  Atrophic change within normal limits for the patient's age. Empty sella. Signing/Reading Doctor: Amrit Kumar ([id number])    Approved: Amrit Kumar ([id number])  09/19/2011                                     CT Results (most recent):    Results from [place] encounter on 08/11/17   CT ABD PELV W CONT   Narrative EXAM:  CT ABD PELV W CONT    INDICATION: Diffuse abdominal pain. COMPARISON: Ultrasound 8/11/2017. TECHNIQUE: Helical CT of the abdomen  and pelvis  following the uneventful  intravenous administration of nonionic contrast.  Coronal and sagittal reformats  are performed.  CT dose reduction was achieved through use of a standardized  protocol tailored for this examination and automatic exposure control for dose  modulation. FINDINGS:   The visualized lung bases demonstrate no mass or consolidation. The heart size  is normal. There is no pericardial or pleural effusion. The liver, spleen, pancreas, and adrenal glands are normal. The kidneys are  symmetric without hydronephrosis. There are 2 right kidney cyst, the larger  measuring 2.8 cm. The gall bladder is present  without intra- or extra-hepatic  biliary dilatation. There are no dilated bowel loops. The appendix is normal.  There are no  enlarged lymph nodes. There is no free fluid or free air. The aorta tapers  without aneurysm. There is aortoiliac atherosclerosis. The urinary bladder is nondistended. There is no pelvic mass. There is a tiny fat-containing umbilical hernia. The bony structures are  age-appropriate. Impression IMPRESSION:   There is no acute abnormality in the abdomen or pelvis. Incidental right kidney  cysts. Reviewed records in [facility] and [facility] today    Lab Review   Results for orders placed or performed in visit on 08/03/18   AMB POC HEMOGLOBIN A1C   Result Value Ref Range    Hemoglobin A1c (POC) 5.6 %        Exam:  Visit Vitals    /82    Pulse 88    Ht 5' 1\" (1.549 m)    SpO2 94%     General:  Alert, cooperative, no distress. Head:  Normocephalic, without obvious abnormality, atraumatic. Respiratory:  Heart:   Non labored breathing  Regular rate and rhythm, no murmurs   Neck:   2+ carotids, no bruits   Extremities: Warm, no cyanosis or edema. Pulses: 2+ radial pulses. Neurologic:  MS: Alert, speech intact. Language -see MMSE. Attention and fund of knowledge appropriate. Recent and remote memory impaired to medical history.    Cranial Nerves:  II: visual fields    II: pupils    II: optic disc    III,VII: ptosis none   III,IV,VI: extraocular muscles  EOMI, right esotropia, no nystagmus or diplopia   V: facial light touch sensation     VII: facial muscle function   symmetric   VIII: hearing intact   IX: soft palate elevation     XI: trapezius strength     XI: sternocleidomastoid strength    XII: tongue       Motor: normal bulk and tone, no tremor seen, Strength: 5/5 throughout UE, 5-/5 HF, no PD  Sensory:   Coordination: FTN and EMI intact  Gait: WC bound  Reflexes: 1+ symmetric  Mini Mental State Exam 8/16/2018 1/2/2018   What is the Year 1 1   What is the Season 1 1   What is the Date 1 1   What is the Day 1 1   What is the Month 1 1   Where are we State 1 1   Where are we Country 1 1   Where are we [address] [place] [address] or [facility] 1 1   Whree are we Floor 1 1   Name three objects, then ask the patient to say them 3 3   Serial sevens Subtract 7 from 100 in increments 5 -   Ask for the three objects repeated above 3 -   Name a pencil 1 1   Name a watch 1 1   Have the patient repeat this phrase \"No ifs, ands, or buts\" 1 1   Three stage command: Take the paper in your right hand 1 1   Fold the paper in half 1 1   Put the paper on the floor 1 1   Read and obey the following: CLOSE YOUR EYES 1 -   Have the patient write a sentence 1 -   Have the patient copy a figure 1 -   Mini Mental Score 30 -   Some recent data might be hidden              Assessment/Plan   Pt is a 76 y.o. right handed female with various issues noted by her PCP - memory loss, HA, tremors, gait issues, and weakness, with patient only concerned about her memory, with diagnosis of dementia in 2015, but not reflected in MMSE, with h/o tremors in 2013 - pt denied tremors and none seen on exam, not concerned about her headaches that respond to tylenol, and known h/o lumbar spinal stenosis leaving her non-ambulatory and WC bound. New c/o of flare up of pain in posterior right leg, likely related to spine disease/position in WC. MRI brain w/wo 9/19/11 for gait abnormality - age related atrophy, o/w unremarkable.  Exam with MMSE score of 30/30, diminished reflexes throughout, mild weakness in LE, WC bound. MMSE exam has improved over time and not declined since 2013, possible MCI, low suspicion for dementia, but will continue to monitor. Memory issues in past may have related to medical issues or a pseudodementia related to mood disorder. B12 ordered. May need PT again for right leg pain if does not have spontaneous improvement. F/u in clinic in six months, instructed to call in the interim if needed. ICD-10-CM ICD-9-CM    1. Memory loss R41.3 780.93 VITAMIN B12   2. Vitamin B12 deficiency E53.8 266.2 VITAMIN B12       Signed:   Gali Rivera MD  8/16/2018

## 2018-08-16 NOTE — PROGRESS NOTES
Ms. Milly Martines is here to follow up on decreased memory. She is alone at today's visit but reports she is living at [facility] at [facility]. She reports pain and edema of bilateral lower extremities.

## 2018-08-16 NOTE — MR AVS SNAPSHOT
[place] [address] [place] [address] 
[phone] Patient: Lam Murphy MRN:  [id number] Visit Information Date & Time Provider Department Dept. Phone Encounter #  
 2018  2:20 PM Edward Hatch MD [facility] at [address] [id number] Follow-up Instructions Return in about 6 months (around 2019). Your Appointments 10/9/2018 11:30 AM  
ROUTINE CARE with DO Derrick Vera 74 (JASMIN Harding) Appt Note: referral follow up , leg swelling. [address] [facility] [address] [facility] 133  
  
   
 14 Belia Champagne 5  
  
    
 2019 10:15 AM  
ESTABLISHED PATIENT with Zenobia Gold MD  
[facility]) Appt Note: 6 MO. F/U FOLLOWING ECHO  
 26[address] [facility] [place] 110 1400 [address] [place] [address]  
[phone] [address] Upcoming Health Maintenance Date Due  
 MEDICARE YEARLY EXAM 2018 Influenza Age 5 to Adult 2019* GLAUCOMA SCREENING Q2Y 2020 DTaP/Tdap/Td series (2 - Td) 2026 *Topic was postponed. The date shown is not the original due date. Allergies as of 2018  Review Complete On: 2018 By: Edward Hatch MD  
  
 Severity Noted Reaction Type Reactions Aristocort [Triamcinolone] High 1970    Hives Aristocort shot Codeine High 08/10/2009    Itching Lasix [Furosemide] High 08/10/2009    Itching Bupropion  2011    Other (comments) Severe headache Darvocet A500 [Propoxyphene N-acetaminophen]  2010    Other (comments)  
 headache Influenza Virus Vaccine Whole  2009    Other (comments)  
 pharyngitis Mevacor [Lovastatin]  08/10/2009    Other (comments) myalgias Percocet [Oxycodone-acetaminophen]  07/15/2011    Itching Worse headache Rhinocort  08/10/2009    Other (comments)  
 nosebleed Simvastatin  08/10/2009    Other (comments)  
 myalgias Current Immunizations  Reviewed on 4/5/2018 Name Date Influenza Vaccine Split  Deferred (Contraindication) TB Skin Test (PPD) Intradermal 8/5/2013 TD Vaccine 3/28/2008 ZZZ-RETIRED (DO NOT USE) Pneumococcal Vaccine (Unspecified Type)  Deferred (Patient Refused) Not reviewed this visit You Were Diagnosed With   
  
 Codes Comments Memory loss    -  Primary ICD-10-CM: R41.3 ICD-9-CM: 780.93 Vitamin B12 deficiency     ICD-10-CM: E53.8 ICD-9-CM: 266.2 Vitals BP Pulse Height(growth percentile) SpO2 OB Status Smoking Status 122/82 88 5' 1\" (1.549 m) 94% Postmenopausal Former Smoker Vitals History Preferred Pharmacy Pharmacy Name Phone [id number]- [place], [place] - [address] [phone] Your Updated Medication List  
  
   
This list is accurate as of 8/16/18  3:09 PM.  Always use your most recent med list.  
  
  
  
  
 acetaminophen 325 mg tablet Commonly known as:  TYLENOL Take  by mouth every four (4) hours as needed for Pain. amLODIPine 2.5 mg tablet Commonly known as:  [place] Conine Take 1 tablet by mouth daily. aspirin 81 mg chewable tablet Take 1 Tab by mouth daily. Indications: prevention of cerebrovascular accident  
  
 bumetanide 1 mg tablet Commonly known as:  Martin Ashley TAKE 1 TABLET BY MOUTH EVERY DAY  
  
 BUTALBITAL COMPOUND-CODEINE 12--40 mg per capsule Generic drug:  codeine-butalbital-aspirin-caffeine Take  by mouth every six (6) hours as needed for Pain. fluticasone 50 mcg/actuation nasal spray Commonly known as:  Osmani Ferris SPRAY 2 SPRAYS INTO BOTH NOSTRILS DAILY FOR ALLERGIC RHINITIS  
  
 lisinopril 5 mg tablet Commonly known as:  Marianela Lights Take 1 tablet by mouth daily. loratadine 10 mg tablet Commonly known as:  Kamilah Do Take 1 Tab by mouth daily. mirtazapine 15 mg disintegrating tablet Commonly known as:  REMERON SOL-TAB  
  
 montelukast 10 mg tablet Commonly known as:  SINGULAIR  
  
 nicotinic acid 500 mg tablet Commonly known as:  NIACIN Take 500 mg by mouth two (2) times daily (with meals). Indications: hypertriglyceridemia  
  
 ondansetron 8 mg disintegrating tablet Commonly known as:  ZOFRAN ODT Take 8 mg by mouth every eight (8) hours as needed. OTHER Please dispense an Ultra light Wheelchair DX.severe osteoarthritis, fibromyalgia, Bell's Palsy, DJD,muscle weakness, lumbar stenosis, knee and shoulder pain  
  
 pantoprazole 40 mg tablet Commonly known as:  PROTONIX  
  
 potassium chloride SR 10 mEq tablet Commonly known as:  KLOR-CON 10 Take  by mouth daily. sertraline 50 mg tablet Commonly known as:  ZOLOFT  
  
 traMADol 50 mg tablet Commonly known as:  ULTRAM  
Take 50 mg by mouth every eight (8) hours as needed for Pain. VENTOLIN HFA 90 mcg/actuation inhaler Generic drug:  albuterol Take 2 Puffs by inhalation every six (6) hours as needed for Wheezing. VITAMIN D3 1,000 unit Cap Generic drug:  cholecalciferol Take  by mouth daily. We Performed the Following VITAMIN B12 [id number] CPT(R)] Follow-up Instructions Return in about 6 months (around 2/16/2019). Patient Instructions A Healthy Lifestyle: Care Instructions Your Care Instructions A healthy lifestyle can help you feel good, stay at a healthy weight, and have plenty of energy for both work and play. A healthy lifestyle is something you can share with your whole family. A healthy lifestyle also can lower your risk for serious health problems, such as high blood pressure, heart disease, and diabetes. You can follow a few steps listed below to improve your health and the health of your family. Follow-up care is a key part of your treatment and safety. Be sure to make and go to all appointments, and call your doctor if you are having problems. It's also a good idea to know your test results and keep a list of the medicines you take. How can you care for yourself at home? · Do not eat too much sugar, fat, or fast foods. You can still have dessert and treats now and then. The goal is moderation. · Start small to improve your eating habits. Pay attention to portion sizes, drink less juice and soda pop, and eat more fruits and vegetables. ¨ Eat a healthy amount of food. A 3-ounce serving of meat, for example, is about the size of a deck of cards. Fill the rest of your plate with vegetables and whole grains. ¨ Limit the amount of soda and sports drinks you have every day. Drink more water when you are thirsty. ¨ Eat at least 5 servings of fruits and vegetables every day. It may seem like a lot, but it is not hard to reach this goal. A serving or helping is 1 piece of fruit, 1 cup of vegetables, or 2 cups of leafy, raw vegetables. Have an apple or some carrot sticks as an afternoon snack instead of a candy bar. Try to have fruits and/or vegetables at every meal. 
· Make exercise part of your daily routine. You may want to start with simple activities, such as walking, bicycling, or slow swimming. Try to be active 30 to 60 minutes every day. You do not need to do all 30 to 60 minutes all at once. For example, you can exercise 3 times a day for 10 or 20 minutes. Moderate exercise is safe for most people, but it is always a good idea to talk to your doctor before starting an exercise program. 
· Keep moving. Char Estrada the lawn, work in the garden, or Sunglass. Take the stairs instead of the elevator at work. · If you smoke, quit.  People who smoke have an increased risk for heart attack, stroke, cancer, and other lung illnesses. Quitting is hard, but there are ways to boost your chance of quitting tobacco for good. ¨ Use nicotine gum, patches, or lozenges. ¨ Ask your doctor about stop-smoking programs and medicines. ¨ Keep trying. In addition to reducing your risk of diseases in the future, you will notice some benefits soon after you stop using tobacco. If you have shortness of breath or asthma symptoms, they will likely get better within a few weeks after you quit. · Limit how much alcohol you drink. Moderate amounts of alcohol (up to 2 drinks a day for men, 1 drink a day for women) are okay. But drinking too much can lead to liver problems, high blood pressure, and other health problems. Family health If you have a family, there are many things you can do together to improve your health. · Eat meals together as a family as often as possible. · Eat healthy foods. This includes fruits, vegetables, lean meats and dairy, and whole grains. · Include your family in your fitness plan. Most people think of activities such as jogging or tennis as the way to fitness, but there are many ways you and your family can be more active. Anything that makes you breathe hard and gets your heart pumping is exercise. Here are some tips: 
¨ Walk to do errands or to take your child to school or the bus. ¨ Go for a family bike ride after dinner instead of watching TV. Where can you learn more? Go to http://luigi-atif.info/. Enter Z665 in the search box to learn more about \"A Healthy Lifestyle: Care Instructions. \" Current as of: December 7, 2017 Content Version: 11.7 © 8702-6474 [employer]. Care instructions adapted under license by [employer] (which disclaims liability or warranty for this information).  If you have questions about a medical condition or this instruction, always ask your healthcare professional. [employer] disclaims any warranty or liability for your use of this information. Introducing [facility]! [facility] introduces Jam[employer] patient portal. Now you can access parts of your medical record, email your doctor's office, and request medication refills online. 1. In your internet browser, go to [email or web address] 2. Click on the First Time User? Click Here link in the Sign In box. You will see the New Member Sign Up page. 3. Enter your Jampp Access Code exactly as it appears below. You will not need to use this code after youve completed the sign-up process. If you do not sign up before the expiration date, you must request a new code. · Jampp Access Code: [email or web address] Expires: 10/9/2018 11:11 AM 
 
4. Enter the last four digits of your Social Security Number (xxxx) and Date of Birth (mm/dd/yyyy) as indicated and click Submit. You will be taken to the next sign-up page. 5. Create a Jampp ID. This will be your Jampp login ID and cannot be changed, so think of one that is secure and easy to remember. 6. Create a [employer] password. You can change your password at any time. 7. Enter your Password Reset Question and Answer. This can be used at a later time if you forget your password. 8. Enter your e-mail address. You will receive e-mail notification when new information is available in [address] 9. Click Sign Up. You can now view and download portions of your medical record. 10. Click the Download Summary menu link to download a portable copy of your medical information. If you have questions, please visit the Frequently Asked Questions section of the Jampp website. Remember, Jampp is NOT to be used for urgent needs. For medical emergencies, dial 911. Now available from your iPhone and Android! Please provide this summary of care documentation to your next provider. Your primary care clinician is listed as Janae Berman. If you have any questions after today's visit, please call [phone].

## 2018-10-10 ENCOUNTER — HOSPITAL ENCOUNTER (OUTPATIENT)
Dept: PHYSICAL THERAPY | Age: 78
Discharge: HOME | End: 2018-10-10
Payer: MEDICARE

## 2018-10-10 VITALS — DIASTOLIC BLOOD PRESSURE: 78 MMHG | OXYGEN SATURATION: 98 % | SYSTOLIC BLOOD PRESSURE: 118 MMHG | HEART RATE: 74 BPM

## 2018-10-10 PROCEDURE — 97140 MANUAL THERAPY 1/> REGIONS: CPT

## 2018-10-10 PROCEDURE — G8991 OTHER PT/OT GOAL STATUS: HCPCS

## 2018-10-10 PROCEDURE — G8990 OTHER PT/OT CURRENT STATUS: HCPCS

## 2018-10-10 PROCEDURE — 97162 PT EVAL MOD COMPLEX 30 MIN: CPT

## 2018-10-10 NOTE — PROGRESS NOTES
[facility] [address] INITIAL EVALUATION 
 
NAME: Michael Medina AGE: 76 y.o. GENDER: female DATE: 10/10/18 REFERRING PHYSICIAN:  
HISTORY AND BACKGROUND:  Patient presents to clinic via transportation co in manual WC accompanied by brother Scout Corrales whom states he's her medical POA). Patient with h/o mild dementia. Patient describes long gradual onset of swelling which increased since she's been in the [facility]. She reports discoloration of the legs, unable to get shoes on due to the size of the feet. Primary Diagnosis: · B LE lymphedema, secondary (I89.0) Other Treatment Diagnoses: 
? Abnormality of gait (other abnormalities of gait and mobility R26.89) ? Swelling not relieved by elevation ( R60.9 edema) ? Venous insufficiency I87.2 ? Morbid Obesity (E66.01) Date of Onset: 10+ year gradual onset of LE swelling. Present Symptoms and Functional Limitations: Limited mobility and spending most of her time in a WC, transfers only. Lymphedema Life Impact Scale: 18/72, CJ, 26% Impairment Past Medical History:  
Past Medical History:  
Diagnosis Date  Abnormal cardiovascular stress test 04/18/08 Dr. Nancy Barclay  Allergy, unspecified not elsewhere classified  Bell's palsy 02/2005 Left. Dr. August Sandoval  CAD (coronary artery disease) Dr. Nancy Barclay  Cataract Dr. Jaqueline Davis. Dr. Mitchell Gooden  Chest pain 2002 Dr. Miguel  Chronic headache 2013  
 due to depression. Dr. Hank Wilburn.  Chronic pain LEGS AND FEET; BACK UNDER RIGHT SHOULDER  
 Congenital eye disorder Lazy eyes. Dr. Barton Cirri  Dementia Dr. [facility].  Depression 2011 Dr. Soila Patrick (psychologist). Dr. Hank Wilburn. Dr. Ariela DELACRUZD (degenerative joint disease) knees, low back. Dr. Rosalio Harley. Dr. Linda Lainez  Fibromyalgia 1980s Dr. Elinor Manuel. Dr. Klaus Villareal  Gait instability 02/10/09 Dr. Tiffani Mercedes. Dr. Aaron Valverde.  Gallstones  GERD (gastroesophageal reflux disease)  Hiatal hernia 1977  Hypercholesteremia  Hypertension  Ill-defined condition \"JUST ONE NIGHT I WENT TO STAND UP AND I JUST COULDN'T STAND\"  PT NOT SURE WHY  Leg weakness 12/2009 Dr. Donna Davies. Dr. Tuan Harris.  Lower extremity venous stasis Dr. Lucas Encarnacion  Lumbar spinal stenosis 12/2009 L3-5. Dr. Susan Welch. PARAPLEGIA SINCE 2012  Lymphedema 12/19/08 Dr. Marcia Cruz then Dr. Debbi Camarillo  Memory loss 2013 Dr. Marisabel Zaldivar.  Menopause  Migraine Ocular. Dr. Monica Waldrop  NORMA (obstructive sleep apnea) 01/24/07 Dr. Zhou Kruse. NO CPAP  
 Osteoporosis 02/10/09 Dr. Franklin Lizarraga  Pancreatitis 03/2011  Post-menopausal bleeding 07/2016  
 due to Dr. Doyle Rmoeo Rotator cuff injury Left. Dr. Олег Cohn; chronic bilateral  
 Seborrheic keratosis 2005 Dr. May Guevara 02/05/09 Dr. Dave Simmons  Tremor 2013 Dr. Aaron Valverde.  Unspecified vitamin D deficiency 07/2009  Urinary incontinence 2013 Dr. Ashwini Uribe  Venous insufficiency 1998  
 R>L. with chronic leg edema. Dr. Debbi Camarillo Past Surgical History:  
Procedure Laterality Date  HX HEART CATHETERIZATION  06/2008 Dr. Amira Benito. due to abnormal Stress Test  
 [facility]  07/27/2016 D AND C WITH SYMPHION, RESECTION OF ENDOMETRIAL TISSUE. benign. due to postmenopausal bleeding. Dr. Vipul Garnica.  
 
Current Medications:   
Current Outpatient Medications Medication Sig  
 fluticasone (FLONASE) 50 mcg/actuation nasal spray SPRAY 2 SPRAYS INTO BOTH NOSTRILS DAILY FOR ALLERGIC RHINITIS  ondansetron (ZOFRAN ODT) 8 mg disintegrating tablet Take 8 mg by mouth every eight (8) hours as needed.  cholecalciferol (VITAMIN D3) 1,000 unit cap Take  by mouth daily.  codeine-butalbital-aspirin-caffeine (BUTALBITAL COMPOUND-CODEINE) 33--40 mg per capsule Take  by mouth every six (6) hours as needed for Pain.  traMADol (ULTRAM) 50 mg tablet Take 50 mg by mouth every eight (8) hours as needed for Pain.  albuterol (VENTOLIN HFA) 90 mcg/actuation inhaler Take 2 Puffs by inhalation every six (6) hours as needed for Wheezing.  sertraline (ZOLOFT) 50 mg tablet  potassium chloride SR (KLOR-CON 10) 10 mEq tablet Take  by mouth daily.  pantoprazole (PROTONIX) 40 mg tablet  mirtazapine (REMERON SOL-TAB) 15 mg disintegrating tablet  acetaminophen (TYLENOL) 325 mg tablet Take  by mouth every four (4) hours as needed for Pain.  nicotinic acid (NIACIN) 500 mg tablet Take 500 mg by mouth two (2) times daily (with meals). Indications: hypertriglyceridemia  montelukast (SINGULAIR) 10 mg tablet  loratadine (CLARITIN) 10 mg tablet Take 1 Tab by mouth daily.  aspirin 81 mg chewable tablet Take 1 Tab by mouth daily. Indications: prevention of cerebrovascular accident  OTHER Please dispense an Ultra light Wheelchair DX.severe osteoarthritis, fibromyalgia, Bell's Palsy, DJD,muscle weakness, lumbar stenosis, knee and shoulder pain  amlodipine (NORVASC) 2.5 mg tablet Take 1 tablet by mouth daily.  bumetanide (BUMEX) 1 mg tablet TAKE 1 TABLET BY MOUTH EVERY DAY  lisinopril (PRINIVIL, ZESTRIL) 5 mg tablet Take 1 tablet by mouth daily. No current facility-administered medications for this encounter. Allergies: Allergies Allergen Reactions  Aristocort [Triamcinolone] Hives Aristocort shot  Codeine Itching  Lasix [Furosemide] Itching  Bupropion Other (comments) Severe headache  Darvocet A500 [Propoxyphene N-Acetaminophen] Other (comments) headache  Influenza Virus Vaccine Whole Other (comments)  
  pharyngitis  Mevacor [Lovastatin] Other (comments)  
  myalgias  Percocet [Oxycodone-Acetaminophen] Itching Worse headache  Rhinocort Other (comments)  
  nosebleed  Simvastatin Other (comments)  
  myalgias Prior Level of Function/Social/Work History/Personal factors and/or co morbidities impacting plan of care: Lives in [place] [address] at [facility] Living Situation: [address] Trainable Caregiver?: Caregivers, Brother Self-care/ADLs: Needs A for hygiene, baths, LE dressing (currently sponge baths daily and full shower 2x/week) Mobility: WC bound, transfers only with 2 person assist from staff Sleeping Arrangement:  Bed Adaptive Equipment Owned: ODALYS VILLARREAL 
Previous Therapy:  None Compression/Lymphedema Equipment:  None SUBJECTIVE:  
Patients goals for therapy: To be able to get shoes on. Te get swelling down OBJECTIVE DATA SUMMARY:  
EXAMINATION/PRESENTATION/DECISION MAKING: Pain:1/10 LEs Self-Care and ADLs: 
Grooming: Independent  Bathing: Assist x1   
UB Dressing: Modified independent  LB Dressing: Assist x1 Don/Doff Shoes/Socks: Total assistance  Toileting: Total assistance Skin and Tissue Assessment: 
Dermal Status: 
[]   Intact [x]  Dry  
[]  Tenuous [x] Flaky  
[]  Wound/lesion present []  Scars  
[]  Dermatitis Texture/Consistency: 
[]  Boggy []  Pitting Edema  
[]  Brawny []  Combination  
[x]  Fibrotic/Woody Pigmentation/Color Change: 
[]  Normal [x]  Hemosiderin  
[]  Red []  Erythematous [x]  Hyperpigmented: B/L Below knee and feet [x]  Hyperlipodermatosclerosis Anomalies: 
[]  Lymphorrhea []  Vesicles []  Petechiae []  Warty Vercusis  
[]  Bullae [x]  Papilloma and hyperkeratinosis with abnormal shaping Circulatory: 
[]  DALIA []  Varicosities:  
[x]  Pulses: NT []  Vascular studies ruled out DVT in past  
[]  DVT History B/L Anterior                                                    Left lateral induration                              B/L Feet/ankle Nails: 
[]   Normal 
[x]   Fungus Stemmers Sign: Pos Height:    NT Weight:    Unable to obtain WC bound BMI:    
(36 or greater: adversely affecting lymphedema) Volumetric Measurements:  
Right: 10,042. 11 mL Left: 9,297.48mL  
% Difference: 8.01 R LE larger than L LE  
(See scanned graph) Range of Motion: Decreased hip/knee NT due to WC; ankle DF to neutral; Knee flexion limited by position and soft tissue approximation Strength: Knee extension 4/5; ankle DF 3/5 Sensation:  Intact Mobility: 
Bed/Chair Mobility:  Assist x2 verbal report Transfers:  Assist x2 verbal report Sitting Balance:  Good supported in WC Standing Balance:  NT  
Gait: Non ambulatory Wheelchair Mobility: Man WC. UE propulsion Endurance:  Decreased Stairs: NT Safety: 
Patient is alert and oriented:  Yes Safety awareness:  Yes Fall Risk?:  Yes, mild dementia, WC primary mode locomotion, transfers x 2 assist only Patient given written fall prevention handout: Yes Precautions:  Standard lymphedema precautions to include avoiding blood pressure readings, injections and IVs or other procedures/acts that could lead to broken skin on affected area, and avoiding excessive heat, resistive activity or altitude without compression garment Functional Measure: In compliance with CMSs Claims Based Outcome Reporting, the following G-code set was chosen for this patient based on their primary functional limitation being treated: The outcome measure chosen to determine the severity of the functional limitation was the LLIS with a score of 18/72 which was correlated with the impairment scale. ? Other PT/OT Primary Functional Limitations:  
  - CURRENT STATUS: CJ - 20%-39% impaired, limited or restricted  - GOAL STATUS: CI - 1%-19% impaired, limited or restricted  - D/C STATUS:  ---------------To be determined--------------- Physical Therapy Evaluation Charge Determination History Examination Presentation Decision-Making MEDIUM  Complexity : 1-2 comorbidities / personal factors will impact the outcome/ POC  MEDIUM Complexity : 3 Standardized tests and measures addressing body structure, function, activity limitation and / or participation in recreation  MEDIUM Complexity : Evolving with changing characteristics  Other outcome measures LLIS  MEDIUM Based on the above components, the patient evaluation is determined to be of the following complexity level: MEDIUM Evaluation Time: 1130-12 TREATMENT PROVIDED:  
1. Treatment description:  The patient and her brother were educated regarding the role and function of the lymphatic system, and instructed in the lymphedema management protocol of complete decongestive therapy (CDT). This includes skin care to prevent breakdown or infection, lymphedema exercises, custom compression therapy options (bandaging, compression garments, compression pump, Vandana Sera, [employer]Pak, [employer], etc. as needed), and decongestion with manual lymphatic drainage as indicated. We discussed the need for consistent compression system for lymphedema management. The patient was given two choices to address her condition based upon her situation:  1st:  Full CDT program with 2x/week appointments for 4-6 weeks focusing on MLB to reduce the limb size and improve the shape prior to being fit into a more permanent style compression such as a Velcro device. Supplies associated with this would be out of pocket as medicare does not cover DME supplies. See media for handout provided to patient. The 2nd choice was to be measured for a Velcro device now to possibly provide some reduction however mostly to maintain her current condition.   Per the conversation with Denise Cain they wish to discuss these options and PT will make contact via phone in 1-2 weeks time to determine how to proceed. Patient was measure for a [employer] foot and ankle, lower leg piece. She was provided with hygiene instructions for daily wash and dry to LE's, need for podiatry consult, need to have a referral for HHPT to improve functional mobility and increase LE strength which could aid her venous return with the muscle pump. Treatment time:   Minutes: 35 manual 2 
 
ASSESSMENT:  
Lemuel Kenney is a 76 y.o. female who presents with Stage 2,3 CVI/Lymphedema of B/L LE especially the feet/ankles, lower legs and medial thighs with significant tophic changes to the skin below knees which increase her risk for cellulitis infection. She presents with 8% volume difference with the R LE larger than the L. She is unable to don shoes due to the swelling of the dorsal foot and poor shaping which is occurring at the ankles. She has textural and significant skin changes with discoloration related to chronic swelling. She will require a Podiatry consult to address her foot care; HHPT to advance her functional mobility/transfer status to improve her LE muscle pump mechanism, and she will require daily LE hygiene per handout. She would benefit from decongestion of the legs prior to being fit for a Velcro device however Bernard Romberg expressing concerns with time commitment, transportation issues, financial constraints. He was given the options above and decision to proceed TBD. Patient will benefit from complete decongestive therapy (CDT) including manual lymphatic drainage (MLD) technique, short-stretch textile bandages/compression system to decongest limb, and kinesio taping as appropriate at minimum for below the knee however may require above knee.   Patient will receive instruction in proper skin care to recognize signs/symptoms of and prevent infection, therapeutic exercise, and self-MLD for independent home program and restorative lymphatic performance. This care is medically necessary due to the infection risk with lymphedema, and to improve functional activities. CDT is necessary to resolve swelling to allow patient to return to wearing normal clothes/footwear, and prevent worsening of symptoms, such as venous stasis ulcerations, infections, or hospitalizations. Patient will be independent with home program strategies to allow improved ADL ability and mobility and to allow patient to return to greatest functional independence. Rehabilitation potential is considered to be Fair. Factors which may influence rehabilitation potential include functional status, assisted living care, unable to reach her toes due to body habitus, financial, transportation. .  Patient will benefit from 2/wk  physical therapy visits over 4-6 weeks to optimize improvement in these areas. PLAN OF CARE:  
Recommendations and Planned Interventions: 
Manual lymph drainage/complete decongestive therapy Multi-layer compression bandaging (short-stretch) Compression garment fitting/provision Lymphedema therapeutic exercise AROM/PROM/Strength/Coordination Self-care training Functional mobility training Education in skin care and lymphedema precautions Self-MLD education per home program 
Self-bandaging education per home program 
Caregiver education as needed Wound care as needed GOALS Short term goals Time frame: 2 weeks 1. Instruct the patient to be independent with proper skin care to prevent future skin breakdown and decrease the potential risk for infections that are associated with Lymphedema. 2. Patient will be independent with a personal lymphedema exercise program to assist with the lymphatic flow and reduce limb volume. 3. Patient will understand the signs and symptoms of acute infection. 4. Patient will tolerate 4 hours of daily compression to b/l LE Long term goals Time frame: 4 weeks 1. Patient will have knowledge of the compression options and acquire a safe and  appropriate daytime and night time compression system to prevent   re-accumulation of fluid. 2.  Patient or family will be able to don/doff garments independently. Garment  system effectiveness will be evaluated prior to discharge for better long-term  management and outcomes. 3.   To transition patient to independent restorative phase of CDT. 4.  Patient will tolerate 8 hours of daily compression b/l LE 
5. LLIS improved 4-5 points to demonstrate improvement of condition. Patient has participated in goal setting and agrees to work toward plan of care. Patient was instructed to call if questions or concerns arise. Thank you for this referral. 
Thea Yoo, DPT Time Calculation: 65 mins TREATMENT PLAN EFFECTIVE DATES:  
10/10/18 TO 1/5/18 I have read the above plan of care for Farnaz Caraballo. I certify the above prescribed services are required by this patient and are medically necessary. The above plan of care has been developed in conjunction with the lymphedema/physical therapist.  
 
 
Physician Signature: ____________________________________Date:______________

## 2018-10-12 ENCOUNTER — TELEPHONE (OUTPATIENT)
Dept: FAMILY MEDICINE CLINIC | Age: 78
End: 2018-10-12

## 2018-10-12 NOTE — TELEPHONE ENCOUNTER
Called patient, patient gave 2 identifiers. Informed patient that she did not give him access on her paperwork to release that kind of information to her brother, only to  paperwork/meds. Patient stated that he is her power of  and can do anything needed. Informed patient until we have documentation of this I cannot call him to discuss anything besides picking up meds/paperwork. Patient verbally understood and will get brother to bring paperwork to office.

## 2018-10-12 NOTE — TELEPHONE ENCOUNTER
----- Message from Mikala Kebede sent at 10/12/2018  2:33 PM EDT -----  Regarding: Dr. Bingham Fix  . Corin Hudson, pt's brother, requesting to speak with the nurse in regards to appts. Pt had an appt scheduled Tuesday 10/09/2018 with the office and also an appt for Wednesday 10/10/2018 at [facility]. Inquiring the reason for the visit. Best contact number [phone].

## 2018-11-13 ENCOUNTER — TELEPHONE (OUTPATIENT)
Dept: FAMILY MEDICINE CLINIC | Age: 78
End: 2018-11-13

## 2018-11-13 NOTE — TELEPHONE ENCOUNTER
Spoke with patient after obtaining 2 patient identifiers  Per patient she wanted to inform provider that she will be no longer be wearing her compression stockings. Patient says they are too tight.

## 2018-11-13 NOTE — TELEPHONE ENCOUNTER
----- Message from Jose Chavez sent at 11/13/2018 11:24 AM EST -----  Regarding: Dr. Jorge Walker telephone  Pt requesting a call back in regards to \"compression Stockings\" being to tight. Pt best contact number is [phone].

## 2019-01-30 ENCOUNTER — DOCUMENTATION ONLY (OUTPATIENT)
Dept: NEUROLOGY | Age: 79
End: 2019-01-30

## 2019-02-05 ENCOUNTER — OFFICE VISIT (OUTPATIENT)
Dept: CARDIOLOGY CLINIC | Age: 79
End: 2019-02-05

## 2019-02-05 VITALS
SYSTOLIC BLOOD PRESSURE: 148 MMHG | DIASTOLIC BLOOD PRESSURE: 70 MMHG | BODY MASS INDEX: 54.8 KG/M2 | RESPIRATION RATE: 20 BRPM | HEART RATE: 78 BPM | OXYGEN SATURATION: 98 % | HEIGHT: 61 IN

## 2019-02-05 DIAGNOSIS — I87.2 CHRONIC VENOUS INSUFFICIENCY: ICD-10-CM

## 2019-02-05 DIAGNOSIS — Z78.9 STATIN INTOLERANCE: ICD-10-CM

## 2019-02-05 DIAGNOSIS — I87.8 LOWER EXTREMITY VENOUS STASIS: ICD-10-CM

## 2019-02-05 DIAGNOSIS — R26.81 GAIT INSTABILITY: ICD-10-CM

## 2019-02-05 DIAGNOSIS — F33.3 MAJOR DEPRESSIVE DISORDER, RECURRENT EPISODE, SEVERE, WITH PSYCHOSIS (HCC): ICD-10-CM

## 2019-02-05 DIAGNOSIS — I10 ESSENTIAL HYPERTENSION: Primary | ICD-10-CM

## 2019-02-05 DIAGNOSIS — G30.1 SENILE DEMENTIA OF ALZHEIMER'S TYPE (HCC): Chronic | ICD-10-CM

## 2019-02-05 DIAGNOSIS — E78.2 MIXED HYPERLIPIDEMIA: ICD-10-CM

## 2019-02-05 DIAGNOSIS — Z99.3 WHEELCHAIR DEPENDENCE: ICD-10-CM

## 2019-02-05 DIAGNOSIS — E66.01 MORBID OBESITY (HCC): Chronic | ICD-10-CM

## 2019-02-05 DIAGNOSIS — F02.80 SENILE DEMENTIA OF ALZHEIMER'S TYPE (HCC): Chronic | ICD-10-CM

## 2019-02-05 RX ORDER — EZETIMIBE 10 MG/1
TABLET ORAL
COMMUNITY
Start: 2019-01-21

## 2019-02-05 NOTE — PROGRESS NOTES
[facility]   [address] [facility], [facility] [address]                                          NEW PATIENT HPI/FOLLOW-UP      NAME:  Sarita Griffin   :   1940   MRN:   [id number]   PCP:  William Keenan DO           Subjective: The patient is a [de-identified]y.o. year old female  who returns for a routine follow-up. Since the last visit, patient reports no new symptoms. Still in assisted living and tolerating with roommate. Basically ambulates in wheelchair but walks as well. Denies change in exercise tolerance, chest pain, ++edema, medication intolerance, palpitations, shortness of breath, PND/orthopnea wheezing, sputum, syncope, dizziness or light headedness. Doing satisfactorily. Review of Systems  General: Pt denies excessive weight gain or loss. Pt is able to conduct ADL's. Respiratory: Denies shortness of breath, LEIVA, wheezing or stridor. Cardiovascular: Denies precordial pain, palpitations, ++edema or PND  Gastrointestinal: Denies poor appetite, indigestion, abdominal pain or blood in stool  Peripheral vascular: Denies claudication, leg cramps  Neuropsychiatric: Denies paresthesias,tingling,numbness,anxiety,depression,fatigue  Musculoskeletal: Denies pain,tenderness, soreness,swelling      Past Medical History:   Diagnosis Date    Abnormal cardiovascular stress test 08    Dr. Nguyễn Older    Allergy, unspecified not elsewhere classified     Bell's palsy 2005    Left. Dr. Wilber Rodriguez CAD (coronary artery disease)     Dr. Rona Cleaning. Dr. Yasmin Lepe Chest pain     Dr. Ale Saldivar Chronic headache 2013    due to depression. Dr. Analia Padilla.  Chronic pain     LEGS AND FEET; BACK UNDER RIGHT SHOULDER    Congenital eye disorder     Lazy eyes. Dr. Ayleen Umanzor.  Depression     Dr. Chico San (psychologist). Dr. Analia Padilla. Dr. Maribel Horan.     DJD (degenerative joint disease)     knees, low back. Dr. Nola Hernandez. Dr. Barbara Lopez. Dr. Brenden Main Gait instability 02/10/09    Dr. Clyde Zimmerman. Dr. Lavon Leal.  Gallstones     GERD (gastroesophageal reflux disease)     Hiatal hernia 1977    Hypercholesteremia     Hypertension     Ill-defined condition     \"JUST ONE NIGHT I WENT TO STAND UP AND I JUST COULDN'T STAND\"  PT NOT SURE WHY    Leg weakness 12/2009    Dr. Alcides Reardon. Dr. Naomy Richards.  Lower extremity venous stasis     Dr. Rohan Mario    Lumbar spinal stenosis 12/2009    L3-5. Dr. Pasha Sim. PARAPLEGIA SINCE 2012    Lymphedema 12/19/08    Dr. Amina Frias then Dr. Rinku Nayak Memory loss 2013    Dr. Hermine Soulier.  Menopause     Migraine     Ocular. Dr. Harmeet Jenkins    NORMA (obstructive sleep apnea) 01/24/07    Dr. Janeen Bolden. NO CPAP    Osteoporosis 02/10/09    Dr. Vertis Brunner    Pancreatitis 03/2011    Post-menopausal bleeding 07/2016    due to Dr. Kendall Barnes Jr.    Rotator cuff injury     Left. Dr. Carlos Ernst; chronic bilateral    Seborrheic keratosis 2005    Dr. Hany Carolina 02/05/09    Dr. Child Records   [facility] Tremor 2013    Dr. Lavon Leal.  Unspecified vitamin D deficiency 07/2009    Urinary incontinence 2013    Dr. Leila Grant Venous insufficiency 1998    R>L. with chronic leg edema.   Dr. Sera Wong     Patient Active Problem List    Diagnosis Date Noted    Neck pain on right side 04/05/2018    Elevated AST (SGOT) 04/05/2018    Wheelchair dependent 01/09/2018    Allergic rhinitis due to allergen 12/05/2017    Coronary artery disease involving native coronary artery of native heart without angina pectoris 08/03/2017    Statin intolerance 08/03/2017    Abnormal mammogram of left breast 08/03/2017    Esotropia 09/29/2016    Cataract 09/29/2016    Essential hypertension 06/02/2015    Vitamin D deficiency 01/27/2015    Hyperglycemia 01/27/2015    Tremor     Chronic headache     Depression with anxiety 08/13/2013    Senile dementia of Alzheimer's type 08/13/2013    Memory loss 01/01/2013    Hypokalemia 03/29/2011    Morbid obesity ([address] 03/29/2011    Chronic venous insufficiency     Lower extremity venous stasis     Lumbar spinal stenosis 12/01/2009    Leg weakness 12/01/2009    Hypercholesteremia     GERD (gastroesophageal reflux disease)     Osteoporosis 02/10/2009    Gait instability 02/10/2009    Lymphedema 12/19/2008      Past Surgical History:   Procedure Laterality Date    HX HEART CATHETERIZATION  06/2008    Dr. Estella Christina. due to abnormal Stress Test    HX MAR AND BSO  09/14/2016    Benoit Thompson HYSTEROSCOPIC MYOMECTOMY  07/27/2016    D AND C WITH SYMPHION, RESECTION OF ENDOMETRIAL TISSUE. benign. due to postmenopausal bleeding. Dr. Bridgette Loja.      Allergies   Allergen Reactions    Aristocort [Triamcinolone] Hives     Aristocort shot    Codeine Itching    Lasix [Furosemide] Itching    Bupropion Other (comments)     Severe headache    Darvocet A500 [Propoxyphene N-Acetaminophen] Other (comments)     headache    Influenza Virus Vaccine Whole Other (comments)     pharyngitis    Mevacor [Lovastatin] Other (comments)     myalgias    Percocet [Oxycodone-Acetaminophen] Itching     Worse headache    Rhinocort Other (comments)     nosebleed    Simvastatin Other (comments)     myalgias      Family History   Problem Relation Age of Onset    Hypertension Mother     Arthritis-osteo Mother     Dementia Mother     Cancer Father         liver    Heart Attack Father     Other Father         kidney stones    Stroke Maternal Grandmother     Hypertension Sister     Arthritis-osteo Sister     Diabetes Paternal Aunt     Anesth Problems Neg Hx       Social History     Socioeconomic History    Marital status: SINGLE     Spouse name: Not on file    Number of children: Not on file    Years of education: Not on file    Highest education level: Not on file   Social Needs    Financial resource strain: Not on file    Food insecurity - worry: Not on file    Food insecurity - inability: Not on file    Transportation needs - medical: Not on file   ILD [employer] needs - non-medical: Not on file   Occupational History    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 Pt lived with smoker dad x 40 yrs   Substance and Sexual Activity    Alcohol use: No    Drug use: No    Sexual activity: Yes     Partners: Male     Birth control/protection: Condom   Other Topics Concern    Not on file   Social History Narrative    Lives in [place] [facility]       Current Outpatient Medications   Medication Sig    ezetimibe (ZETIA) 10 mg tablet     fluticasone (FLONASE) 50 mcg/actuation nasal spray SPRAY 2 SPRAYS INTO BOTH NOSTRILS DAILY FOR ALLERGIC RHINITIS    ondansetron (ZOFRAN ODT) 8 mg disintegrating tablet Take 8 mg by mouth every eight (8) hours as needed.  cholecalciferol (VITAMIN D3) 1,000 unit cap Take  by mouth daily.  codeine-butalbital-aspirin-caffeine (BUTALBITAL COMPOUND-CODEINE) 33--40 mg per capsule Take  by mouth every six (6) hours as needed for Pain.  traMADol (ULTRAM) 50 mg tablet Take 50 mg by mouth every eight (8) hours as needed for Pain.  albuterol (VENTOLIN HFA) 90 mcg/actuation inhaler Take 2 Puffs by inhalation every six (6) hours as needed for Wheezing.  sertraline (ZOLOFT) 50 mg tablet     potassium chloride SR (KLOR-CON 10) 10 mEq tablet Take  by mouth daily.     pantoprazole (PROTONIX) 40 mg tablet     mirtazapine (REMERON SOL-TAB) 15 mg disintegrating tablet     acetaminophen (TYLENOL) 325 mg tablet Take  by mouth every four (4) hours as needed for Pain.  nicotinic acid (NIACIN) 500 mg tablet Take 500 mg by mouth two (2) times daily (with meals). Indications: hypertriglyceridemia    montelukast (SINGULAIR) 10 mg tablet     loratadine (CLARITIN) 10 mg tablet Take 1 Tab by mouth daily.  aspirin 81 mg chewable tablet Take 1 Tab by mouth daily. Indications: prevention of cerebrovascular accident    OTHER Please dispense an Ultra light Wheelchair DX.severe osteoarthritis, fibromyalgia, Bell's Palsy, DJD,muscle weakness, lumbar stenosis, knee and shoulder pain    amlodipine (NORVASC) 2.5 mg tablet Take 1 tablet by mouth daily.  bumetanide (BUMEX) 1 mg tablet TAKE 1 TABLET BY MOUTH EVERY DAY    lisinopril (PRINIVIL, ZESTRIL) 5 mg tablet Take 1 tablet by mouth daily. No current facility-administered medications for this visit. I have reviewed the nurses notes, vitals, problem list, allergy list, medical history, family medical, social history and medications. Objective:     Physical Exam:     Vitals:    02/05/19 1134 02/05/19 1145   BP: 140/60 148/70   Pulse: 78    Resp: 20    SpO2: 98%    Height: 5' 1\" (1.549 m)     Body mass index is 54.8 kg/m². General: Well developed, in no acute distress. HEENT: No carotid bruits, no JVD, trach is midline. Heart:  Normal S1/S2 negative S3 or S4. Regular, no murmur, gallop or rub.   Respiratory: Clear bilaterally, no wheezing or rales  Abdomen:   Soft, non-tender, bowel sounds are active.   Extremities:  No edema, normal cap refill, no cyanosis. Neuro: A&Ox3, speech clear, gait stable. Skin: Skin color is normal. No rashes or lesions. No diaphoresis.   Vascular: 2+ pulses symmetric in all extremities        Data Review:       Cardiographics:    EKG: NSR,baseline artifact, IVCD,LVH    Cardiology Labs:    Results for orders placed or performed during the hospital encounter of 01/27/18   EKG, 12 LEAD, INITIAL   Result Value Ref Range    Ventricular Rate 72 BPM    Atrial Rate 72 BPM    P-R Interval 192 ms    QRS Duration 112 ms    Q-T Interval 414 ms    QTC Calculation (Bezet) 453 ms    Calculated P Axis 32 degrees    Calculated T Axis 11 degrees    Diagnosis       Normal sinus rhythm  Minimal voltage criteria for LVH, may be normal variant  Nonspecific T wave abnormality  When compared with ECG of 14-NOV-2017 10:19,  Nonspecific T wave abnormality, worse in Lateral leads  Confirmed by Dominique Padilla ([id number]) on 1/28/2018 5:05:05 PM     Results for orders placed or performed in visit on 11/30/10   AMB POC EKG ROUTINE W/ 12 LEADS, INTER & REP    Impression    Normal sinus rhythm at 77 bpm.  Will fax to Dr. Luke Dodd. EKG was performed standing. Not able to get pt on  exam table. Lab Results   Component Value Date/Time    Cholesterol, total 244 (H) 12/05/2017 12:21 PM    HDL Cholesterol 51 12/05/2017 12:21 PM    LDL, calculated 153 (H) 12/05/2017 12:21 PM    Triglyceride 199 (H) 12/05/2017 12:21 PM    CHOL/HDL Ratio 4.6 08/14/2013 06:00 AM       Lab Results   Component Value Date/Time    Sodium 140 01/28/2018 12:20 AM    Potassium 3.1 (L) 01/28/2018 12:20 AM    Chloride 102 01/28/2018 12:20 AM    CO2 30 01/28/2018 12:20 AM    Anion gap 8 01/28/2018 12:20 AM    Glucose 193 (H) 01/28/2018 12:20 AM    Glucose 78 08/14/2013 06:00 AM    BUN 14 01/28/2018 12:20 AM    Creatinine 0.96 01/28/2018 12:20 AM    BUN/Creatinine ratio 15 01/28/2018 12:20 AM    GFR est AA >60 01/28/2018 12:20 AM    GFR est non-AA 56 (L) 01/28/2018 12:20 AM    Calcium 8.5 01/28/2018 12:20 AM    Bilirubin, total 0.4 01/28/2018 12:20 AM    AST (SGOT) 107 (H) 01/28/2018 12:20 AM    Alk.  phosphatase 110 01/28/2018 12:20 AM    Protein, total 6.9 01/28/2018 12:20 AM    Albumin 3.1 (L) 01/28/2018 12:20 AM    Globulin 3.8 01/28/2018 12:20 AM    A-G Ratio 0.8 (L) 01/28/2018 12:20 AM    ALT (SGPT) 42 01/28/2018 12:20 AM          Assessment:       ICD-10-CM ICD-9-CM    1. Essential hypertension I10 401.9 AMB POC EKG ROUTINE W/ 12 LEADS, INTER & REP   2. Major depressive disorder, recurrent episode, severe, with psychosis ([facility] [address] F33.3 296.34    3. Morbid obesity ([facility] [address] E66.01 278.01    4. Gait instability R26.81 781.2    5. Chronic venous insufficiency I87.2 459.81    6. Lower extremity venous stasis I87.8 459.81    7. Senile dementia of Alzheimer's type G30.1 331.0     F02.80 294.10    8. Statin intolerance Z78.9 995.27    9. Wheelchair dependence Z99.3 V46.3    10. Mixed hyperlipidemia E78.2 272.2          Discussion: Patient presents at this time stable from a cardiac perspective. Nothing new. Leg edema unchanged. Due to CVI and inactivity. Unable to walk. Lifestyle stable in assisted living. Pleased with present cardiac status. Plan: 1. Continue same meds. Lipid profile and labs followed by PCP--not at goal as Rx difficult. Stain intolerant. Zetia and Niacin not ideal but tolerated. 2.Encouraged to exercise to tolerance, lose weight and follow low fat, low cholesterol, low carb, low sodium predominantly Plant-based (consider Mediterranean) diet. Difficult undertaking in assisted living but trying. Call with questions or concerns. Will follow up any test results by phone and/or f/u here in office if needed. Favian Hayden 3.Follow up: 1 year. I have discussed the diagnosis with the patient and the intended plan as seen in the above orders. The patient has received an after-visit summary and questions were answered concerning future plans. I have discussed any concerning medication side effects and warnings with the patient as well.     Mei Monsalve MD  2/5/2019

## 2019-02-05 NOTE — PROGRESS NOTES
Chief Complaint   Patient presents with    Follow-up     6 mo    Hypertension     1. Have you been to the ER, urgent care clinic since your last visit? Hospitalized since your last visit? No    2. Have you seen or consulted any other health care providers outside of the [address] [place] [address] since your last visit? Include any pap smears or colon screening.  No

## 2019-02-07 PROBLEM — E78.2 MIXED HYPERLIPIDEMIA: Status: ACTIVE | Noted: 2019-02-07

## 2019-04-05 ENCOUNTER — TELEPHONE (OUTPATIENT)
Dept: FAMILY MEDICINE CLINIC | Age: 79
End: 2019-04-05

## 2020-05-12 ENCOUNTER — OFFICE VISIT (OUTPATIENT)
Dept: CARDIOLOGY CLINIC | Age: 80
End: 2020-05-12

## 2020-05-12 VITALS — BODY MASS INDEX: 54.75 KG/M2 | HEIGHT: 61 IN | WEIGHT: 290 LBS

## 2020-05-12 DIAGNOSIS — I89.0 LYMPHEDEMA: Primary | ICD-10-CM

## 2020-05-12 DIAGNOSIS — E78.2 MIXED HYPERLIPIDEMIA: ICD-10-CM

## 2020-05-12 DIAGNOSIS — I10 ESSENTIAL HYPERTENSION: ICD-10-CM

## 2020-05-12 NOTE — PROGRESS NOTES
Chief Complaint   Patient presents with    Hypertension     . Patient lives at [facility] on [address] She is unable to verify medications. I have requested BP and medication list to be faxed to my attention     1. Have you been to the ER, urgent care clinic since your last visit? Hospitalized since your last visit? No    2. Have you seen or consulted any other health care providers outside of the [address] [place] [address] since your last visit? Include any pap smears or colon screening.  No

## 2020-05-12 NOTE — PROGRESS NOTES
Kaci Rincon is a 78 y.o. female evaluated via telephone on 5/12/2020. Consent:  She and/or health care decision maker is aware that that she may receive a bill for this telephone service, depending on her insurance coverage, and has provided verbal consent to proceed: Yes    4/08 abnormal lexiscan with ?anterior ischemia, subsequent normal cath  7/18 mild tr, otherwise normal echo     Documentation:  Pt was contacted regarding appointment rescheduling and they requested a telephone appointment. Details of this discussion including any medical advice provided:   Ms. Ted James is a 57-year-old formally followed by Dr. Lizbeth James for hypertension and dependent lower extremity edema. She is in assisted living and they have been locked down with the epidemic. The nurses administer her medications and bring her food. There is some uncertainty about what she is actually taking so working to follow-up with the nurses there and verify. She gets around by pedaling in a wheelchair and she does have some dependent edema but she says in the morning her legs are just fine. No other cardiac type complaints. Ms. Ted James appears to be stable at this point. She is on a reasonable medical regimen which will verify and she needs no further cardiac testing at this time. current treatment plan is effective, no change in therapy  lab results and schedule of future lab studies reviewed with patient  reviewed diet, exercise and weight control    I affirm this is a Patient Initiated Episode with an Established Patient who has not had a related appointment within my department in the past 7 days or scheduled within the next 24 hours.     Total Time: minutes: 5-10 minutes    Note: not billable if this call serves to triage the patient into an appointment for the relevant concern      Karon Downs MD

## 2022-03-18 PROBLEM — J30.9 ALLERGIC RHINITIS DUE TO ALLERGEN: Status: ACTIVE | Noted: 2017-12-05

## 2022-03-18 PROBLEM — E78.2 MIXED HYPERLIPIDEMIA: Status: ACTIVE | Noted: 2019-02-07

## 2022-03-18 PROBLEM — R74.01 ELEVATED AST (SGOT): Status: ACTIVE | Noted: 2018-04-05

## 2022-03-19 PROBLEM — I25.10 CORONARY ARTERY DISEASE INVOLVING NATIVE CORONARY ARTERY OF NATIVE HEART WITHOUT ANGINA PECTORIS: Status: ACTIVE | Noted: 2017-08-03

## 2022-03-19 PROBLEM — Z99.3 WHEELCHAIR DEPENDENCE: Status: ACTIVE | Noted: 2018-01-09

## 2022-03-19 PROBLEM — R92.8 ABNORMAL MAMMOGRAM OF LEFT BREAST: Status: ACTIVE | Noted: 2017-08-03

## 2022-03-19 PROBLEM — M54.2 NECK PAIN ON RIGHT SIDE: Status: ACTIVE | Noted: 2018-04-05

## 2022-03-20 PROBLEM — Z78.9 STATIN INTOLERANCE: Status: ACTIVE | Noted: 2017-08-03

## 2023-01-20 ENCOUNTER — APPOINTMENT (OUTPATIENT)
Dept: GENERAL RADIOLOGY | Age: 83
DRG: 556 | End: 2023-01-20
Attending: INTERNAL MEDICINE
Payer: MEDICARE

## 2023-01-20 ENCOUNTER — APPOINTMENT (OUTPATIENT)
Dept: CT IMAGING | Age: 83
DRG: 556 | End: 2023-01-20
Attending: INTERNAL MEDICINE
Payer: MEDICARE

## 2023-01-20 ENCOUNTER — APPOINTMENT (OUTPATIENT)
Dept: GENERAL RADIOLOGY | Age: 83
DRG: 556 | End: 2023-01-20
Attending: STUDENT IN AN ORGANIZED HEALTH CARE EDUCATION/TRAINING PROGRAM
Payer: MEDICARE

## 2023-01-20 ENCOUNTER — HOSPITAL ENCOUNTER (INPATIENT)
Age: 83
LOS: 4 days | Discharge: NURSING, REHAB OR LONG-TERM CARE | DRG: 556 | End: 2023-01-24
Attending: STUDENT IN AN ORGANIZED HEALTH CARE EDUCATION/TRAINING PROGRAM | Admitting: INTERNAL MEDICINE
Payer: MEDICARE

## 2023-01-20 DIAGNOSIS — N17.9 AKI (ACUTE KIDNEY INJURY) (HCC): ICD-10-CM

## 2023-01-20 DIAGNOSIS — N30.00 ACUTE CYSTITIS WITHOUT HEMATURIA: Primary | ICD-10-CM

## 2023-01-20 PROBLEM — G93.41 ACUTE METABOLIC ENCEPHALOPATHY: Status: ACTIVE | Noted: 2023-01-20

## 2023-01-20 LAB
ALBUMIN SERPL-MCNC: 3.6 G/DL (ref 3.5–5)
ALBUMIN/GLOB SERPL: 1.2 (ref 1.1–2.2)
ALP SERPL-CCNC: 43 U/L (ref 45–117)
ALT SERPL-CCNC: 10 U/L (ref 12–78)
ANION GAP SERPL CALC-SCNC: 10 MMOL/L (ref 5–15)
APPEARANCE UR: ABNORMAL
AST SERPL-CCNC: 9 U/L (ref 15–37)
BACTERIA URNS QL MICRO: ABNORMAL /HPF
BASE DEFICIT BLD-SCNC: 0.2 MMOL/L
BASOPHILS # BLD: 0 K/UL (ref 0–0.1)
BASOPHILS NFR BLD: 1 % (ref 0–1)
BILIRUB SERPL-MCNC: 0.7 MG/DL (ref 0.2–1)
BILIRUB UR QL: NEGATIVE
BUN SERPL-MCNC: 31 MG/DL (ref 6–20)
BUN/CREAT SERPL: 23 (ref 12–20)
CA-I BLD-MCNC: 1.24 MMOL/L (ref 1.12–1.32)
CALCIUM SERPL-MCNC: 9.4 MG/DL (ref 8.5–10.1)
CHLORIDE BLD-SCNC: 105 MMOL/L (ref 100–108)
CHLORIDE SERPL-SCNC: 105 MMOL/L (ref 97–108)
CK SERPL-CCNC: 41 U/L (ref 26–192)
CO2 BLD-SCNC: 25 MMOL/L (ref 19–24)
CO2 SERPL-SCNC: 26 MMOL/L (ref 21–32)
COLOR UR: ABNORMAL
COVID-19 RAPID TEST, COVR: NOT DETECTED
CREAT SERPL-MCNC: 1.36 MG/DL (ref 0.55–1.02)
CREAT UR-MCNC: 1.4 MG/DL (ref 0.6–1.3)
CRP SERPL-MCNC: <0.29 MG/DL (ref 0–0.6)
DIFFERENTIAL METHOD BLD: ABNORMAL
EOSINOPHIL # BLD: 0.3 K/UL (ref 0–0.4)
EOSINOPHIL NFR BLD: 4 % (ref 0–7)
EPITH CASTS URNS QL MICRO: ABNORMAL /LPF
ERYTHROCYTE [DISTWIDTH] IN BLOOD BY AUTOMATED COUNT: 14.8 % (ref 11.5–14.5)
ERYTHROCYTE [SEDIMENTATION RATE] IN BLOOD: 9 MM/HR (ref 0–30)
FLUAV AG NPH QL IA: NEGATIVE
FLUBV AG NOSE QL IA: NEGATIVE
GLOBULIN SER CALC-MCNC: 2.9 G/DL (ref 2–4)
GLUCOSE BLD STRIP.AUTO-MCNC: 87 MG/DL (ref 74–106)
GLUCOSE SERPL-MCNC: 95 MG/DL (ref 65–100)
GLUCOSE UR STRIP.AUTO-MCNC: NEGATIVE MG/DL
HCO3 BLDA-SCNC: 25 MMOL/L
HCT VFR BLD AUTO: 36.5 % (ref 35–47)
HGB BLD-MCNC: 11.4 G/DL (ref 11.5–16)
HGB UR QL STRIP: NEGATIVE
IMM GRANULOCYTES # BLD AUTO: 0 K/UL (ref 0–0.04)
IMM GRANULOCYTES NFR BLD AUTO: 0 % (ref 0–0.5)
KETONES UR QL STRIP.AUTO: NEGATIVE MG/DL
LACTATE BLD-SCNC: 1.02 MMOL/L (ref 0.4–2)
LEUKOCYTE ESTERASE UR QL STRIP.AUTO: ABNORMAL
LYMPHOCYTES # BLD: 2.4 K/UL (ref 0.8–3.5)
LYMPHOCYTES NFR BLD: 37 % (ref 12–49)
MCH RBC QN AUTO: 30.2 PG (ref 26–34)
MCHC RBC AUTO-ENTMCNC: 31.2 G/DL (ref 30–36.5)
MCV RBC AUTO: 96.8 FL (ref 80–99)
MONOCYTES # BLD: 0.5 K/UL (ref 0–1)
MONOCYTES NFR BLD: 7 % (ref 5–13)
NEUTS SEG # BLD: 3.4 K/UL (ref 1.8–8)
NEUTS SEG NFR BLD: 51 % (ref 32–75)
NITRITE UR QL STRIP.AUTO: NEGATIVE
NRBC # BLD: 0 K/UL (ref 0–0.01)
NRBC BLD-RTO: 0 PER 100 WBC
PCO2 BLDV: 44 MMHG (ref 41–51)
PH BLDV: 7.37 (ref 7.32–7.42)
PH UR STRIP: 5.5 (ref 5–8)
PLATELET # BLD AUTO: 326 K/UL (ref 150–400)
PMV BLD AUTO: 10.4 FL (ref 8.9–12.9)
PO2 BLDV: 35 MMHG (ref 25–40)
POTASSIUM BLD-SCNC: 3.7 MMOL/L (ref 3.5–5.5)
POTASSIUM SERPL-SCNC: 3.7 MMOL/L (ref 3.5–5.1)
PROCALCITONIN SERPL-MCNC: 0.05 NG/ML
PROT SERPL-MCNC: 6.5 G/DL (ref 6.4–8.2)
PROT UR STRIP-MCNC: NEGATIVE MG/DL
RBC # BLD AUTO: 3.77 M/UL (ref 3.8–5.2)
RBC #/AREA URNS HPF: ABNORMAL /HPF (ref 0–5)
SODIUM BLD-SCNC: 142 MMOL/L (ref 136–145)
SODIUM SERPL-SCNC: 141 MMOL/L (ref 136–145)
SOURCE, COVRS: NORMAL
SP GR UR REFRACTOMETRY: 1.01
SPECIMEN SITE: ABNORMAL
TROPONIN-HIGH SENSITIVITY: 12 NG/L (ref 0–51)
UA: UC IF INDICATED,UAUC: ABNORMAL
UROBILINOGEN UR QL STRIP.AUTO: 1 EU/DL (ref 0.2–1)
WBC # BLD AUTO: 6.6 K/UL (ref 3.6–11)
WBC URNS QL MICRO: >100 /HPF (ref 0–4)

## 2023-01-20 PROCEDURE — 81001 URINALYSIS AUTO W/SCOPE: CPT

## 2023-01-20 PROCEDURE — 74011000258 HC RX REV CODE- 258: Performed by: STUDENT IN AN ORGANIZED HEALTH CARE EDUCATION/TRAINING PROGRAM

## 2023-01-20 PROCEDURE — 87040 BLOOD CULTURE FOR BACTERIA: CPT

## 2023-01-20 PROCEDURE — 75635 CT ANGIO ABDOMINAL ARTERIES: CPT

## 2023-01-20 PROCEDURE — 74011000636 HC RX REV CODE- 636: Performed by: INTERNAL MEDICINE

## 2023-01-20 PROCEDURE — 71045 X-RAY EXAM CHEST 1 VIEW: CPT

## 2023-01-20 PROCEDURE — 36415 COLL VENOUS BLD VENIPUNCTURE: CPT

## 2023-01-20 PROCEDURE — 85025 COMPLETE CBC W/AUTO DIFF WBC: CPT

## 2023-01-20 PROCEDURE — 74011250637 HC RX REV CODE- 250/637: Performed by: INTERNAL MEDICINE

## 2023-01-20 PROCEDURE — 74011250636 HC RX REV CODE- 250/636: Performed by: STUDENT IN AN ORGANIZED HEALTH CARE EDUCATION/TRAINING PROGRAM

## 2023-01-20 PROCEDURE — 84145 PROCALCITONIN (PCT): CPT

## 2023-01-20 PROCEDURE — 84484 ASSAY OF TROPONIN QUANT: CPT

## 2023-01-20 PROCEDURE — 99285 EMERGENCY DEPT VISIT HI MDM: CPT

## 2023-01-20 PROCEDURE — 87086 URINE CULTURE/COLONY COUNT: CPT

## 2023-01-20 PROCEDURE — 86140 C-REACTIVE PROTEIN: CPT

## 2023-01-20 PROCEDURE — 65270000046 HC RM TELEMETRY

## 2023-01-20 PROCEDURE — 73502 X-RAY EXAM HIP UNI 2-3 VIEWS: CPT

## 2023-01-20 PROCEDURE — 87804 INFLUENZA ASSAY W/OPTIC: CPT

## 2023-01-20 PROCEDURE — 73590 X-RAY EXAM OF LOWER LEG: CPT

## 2023-01-20 PROCEDURE — 87150 DNA/RNA AMPLIFIED PROBE: CPT

## 2023-01-20 PROCEDURE — 82947 ASSAY GLUCOSE BLOOD QUANT: CPT

## 2023-01-20 PROCEDURE — 80053 COMPREHEN METABOLIC PANEL: CPT

## 2023-01-20 PROCEDURE — 96361 HYDRATE IV INFUSION ADD-ON: CPT

## 2023-01-20 PROCEDURE — 93005 ELECTROCARDIOGRAM TRACING: CPT

## 2023-01-20 PROCEDURE — 87635 SARS-COV-2 COVID-19 AMP PRB: CPT

## 2023-01-20 PROCEDURE — 87186 SC STD MICRODIL/AGAR DIL: CPT

## 2023-01-20 PROCEDURE — 73600 X-RAY EXAM OF ANKLE: CPT

## 2023-01-20 PROCEDURE — 82550 ASSAY OF CK (CPK): CPT

## 2023-01-20 PROCEDURE — 85652 RBC SED RATE AUTOMATED: CPT

## 2023-01-20 PROCEDURE — 87077 CULTURE AEROBIC IDENTIFY: CPT

## 2023-01-20 PROCEDURE — 96374 THER/PROPH/DIAG INJ IV PUSH: CPT

## 2023-01-20 RX ORDER — PROMETHAZINE HYDROCHLORIDE 25 MG/1
12.5 TABLET ORAL
Status: DISCONTINUED | OUTPATIENT
Start: 2023-01-20 | End: 2023-01-24 | Stop reason: HOSPADM

## 2023-01-20 RX ORDER — PANTOPRAZOLE SODIUM 40 MG/1
40 TABLET, DELAYED RELEASE ORAL
Status: DISCONTINUED | OUTPATIENT
Start: 2023-01-21 | End: 2023-01-24 | Stop reason: HOSPADM

## 2023-01-20 RX ORDER — HYDRALAZINE HYDROCHLORIDE 20 MG/ML
20 INJECTION INTRAMUSCULAR; INTRAVENOUS
Status: DISCONTINUED | OUTPATIENT
Start: 2023-01-20 | End: 2023-01-24 | Stop reason: HOSPADM

## 2023-01-20 RX ORDER — EZETIMIBE 10 MG/1
10 TABLET ORAL DAILY
Status: DISCONTINUED | OUTPATIENT
Start: 2023-01-21 | End: 2023-01-24 | Stop reason: HOSPADM

## 2023-01-20 RX ORDER — SERTRALINE HYDROCHLORIDE 50 MG/1
50 TABLET, FILM COATED ORAL DAILY
Status: DISCONTINUED | OUTPATIENT
Start: 2023-01-21 | End: 2023-01-24 | Stop reason: HOSPADM

## 2023-01-20 RX ORDER — KETOROLAC TROMETHAMINE 30 MG/ML
15 INJECTION, SOLUTION INTRAMUSCULAR; INTRAVENOUS
Status: COMPLETED | OUTPATIENT
Start: 2023-01-20 | End: 2023-01-20

## 2023-01-20 RX ORDER — SODIUM CHLORIDE 0.9 % (FLUSH) 0.9 %
5-40 SYRINGE (ML) INJECTION AS NEEDED
Status: DISCONTINUED | OUTPATIENT
Start: 2023-01-20 | End: 2023-01-24 | Stop reason: HOSPADM

## 2023-01-20 RX ORDER — ACETAMINOPHEN 325 MG/1
650 TABLET ORAL
COMMUNITY

## 2023-01-20 RX ORDER — DICLOFENAC SODIUM 10 MG/G
2 GEL TOPICAL
COMMUNITY

## 2023-01-20 RX ORDER — ACETAMINOPHEN 325 MG/1
650 TABLET ORAL
Status: DISCONTINUED | OUTPATIENT
Start: 2023-01-20 | End: 2023-01-24 | Stop reason: HOSPADM

## 2023-01-20 RX ORDER — POLYETHYLENE GLYCOL 3350 17 G/17G
17 POWDER, FOR SOLUTION ORAL DAILY
Status: DISCONTINUED | OUTPATIENT
Start: 2023-01-21 | End: 2023-01-24 | Stop reason: HOSPADM

## 2023-01-20 RX ORDER — ENOXAPARIN SODIUM 100 MG/ML
40 INJECTION SUBCUTANEOUS DAILY
Status: DISCONTINUED | OUTPATIENT
Start: 2023-01-21 | End: 2023-01-24 | Stop reason: HOSPADM

## 2023-01-20 RX ORDER — FENOFIBRIC ACID 45 MG/1
45 CAPSULE, DELAYED RELEASE ORAL DAILY
COMMUNITY

## 2023-01-20 RX ORDER — SODIUM CHLORIDE 0.9 % (FLUSH) 0.9 %
5-40 SYRINGE (ML) INJECTION EVERY 8 HOURS
Status: DISCONTINUED | OUTPATIENT
Start: 2023-01-20 | End: 2023-01-24 | Stop reason: HOSPADM

## 2023-01-20 RX ORDER — ACETAMINOPHEN 650 MG/1
650 SUPPOSITORY RECTAL
Status: DISCONTINUED | OUTPATIENT
Start: 2023-01-20 | End: 2023-01-24 | Stop reason: HOSPADM

## 2023-01-20 RX ORDER — SODIUM CHLORIDE 0.9 % (FLUSH) 0.9 %
5-10 SYRINGE (ML) INJECTION AS NEEDED
Status: DISCONTINUED | OUTPATIENT
Start: 2023-01-20 | End: 2023-01-21 | Stop reason: SDUPTHER

## 2023-01-20 RX ORDER — GUAIFENESIN 100 MG/5ML
81 LIQUID (ML) ORAL DAILY
Status: DISCONTINUED | OUTPATIENT
Start: 2023-01-21 | End: 2023-01-24 | Stop reason: HOSPADM

## 2023-01-20 RX ORDER — EMOLLIENT COMBINATION NO.114
1 CREAM (GRAM) TOPICAL AS NEEDED
COMMUNITY

## 2023-01-20 RX ORDER — MIRTAZAPINE 15 MG/1
7.5 TABLET, FILM COATED ORAL
Status: DISCONTINUED | OUTPATIENT
Start: 2023-01-20 | End: 2023-01-24 | Stop reason: HOSPADM

## 2023-01-20 RX ORDER — CETYL ALC/STEARYL ALC/PG/SLS
1 CREAM (GRAM) TOPICAL AS NEEDED
COMMUNITY

## 2023-01-20 RX ORDER — ONDANSETRON 2 MG/ML
4 INJECTION INTRAMUSCULAR; INTRAVENOUS
Status: DISCONTINUED | OUTPATIENT
Start: 2023-01-20 | End: 2023-01-24 | Stop reason: HOSPADM

## 2023-01-20 RX ORDER — LANOLIN ALCOHOL/MO/W.PET/CERES
3 CREAM (GRAM) TOPICAL
Status: DISCONTINUED | OUTPATIENT
Start: 2023-01-20 | End: 2023-01-24 | Stop reason: HOSPADM

## 2023-01-20 RX ORDER — MORPHINE SULFATE 2 MG/ML
2 INJECTION, SOLUTION INTRAMUSCULAR; INTRAVENOUS
Status: DISCONTINUED | OUTPATIENT
Start: 2023-01-20 | End: 2023-01-24 | Stop reason: HOSPADM

## 2023-01-20 RX ORDER — FENOFIBRATE 54 MG/1
54 TABLET ORAL DAILY
Status: DISCONTINUED | OUTPATIENT
Start: 2023-01-21 | End: 2023-01-24 | Stop reason: HOSPADM

## 2023-01-20 RX ORDER — FACIAL-BODY WIPES
10 EACH TOPICAL DAILY PRN
Status: DISCONTINUED | OUTPATIENT
Start: 2023-01-20 | End: 2023-01-24 | Stop reason: HOSPADM

## 2023-01-20 RX ORDER — ACETAMINOPHEN 500 MG
1000 TABLET ORAL 3 TIMES DAILY
COMMUNITY

## 2023-01-20 RX ADMIN — SODIUM CHLORIDE 1000 ML: 9 INJECTION, SOLUTION INTRAVENOUS at 15:41

## 2023-01-20 RX ADMIN — KETOROLAC TROMETHAMINE 15 MG: 30 INJECTION, SOLUTION INTRAMUSCULAR at 15:36

## 2023-01-20 RX ADMIN — SODIUM CHLORIDE 945 ML: 9 INJECTION, SOLUTION INTRAVENOUS at 18:09

## 2023-01-20 RX ADMIN — IOPAMIDOL 100 ML: 755 INJECTION, SOLUTION INTRAVENOUS at 23:11

## 2023-01-20 RX ADMIN — SODIUM CHLORIDE 1000 ML: 9 INJECTION, SOLUTION INTRAVENOUS at 15:37

## 2023-01-20 RX ADMIN — SODIUM CHLORIDE 1000 ML: 9 INJECTION, SOLUTION INTRAVENOUS at 14:03

## 2023-01-20 RX ADMIN — ACETAMINOPHEN 650 MG: 325 TABLET ORAL at 22:19

## 2023-01-20 RX ADMIN — SODIUM CHLORIDE 1 G: 900 INJECTION INTRAVENOUS at 17:12

## 2023-01-20 RX ADMIN — MIRTAZAPINE 7.5 MG: 15 TABLET, FILM COATED ORAL at 22:20

## 2023-01-20 NOTE — PROGRESS NOTES
Pharmacy Medication Reconciliation     The patient was interviewed regarding current PTA medication list, use and drug allergies; [address] [facility] assisted living. Allergy Update: Aristocort [triamcinolone], Codeine, Lasix [furosemide], Bupropion, Darvocet a500 [propoxyphene n-acetaminophen], Influenza virus vaccine whole, Mevacor [lovastatin], Percocet [oxycodone-acetaminophen], Rhinocort, and Simvastatin    Recommendations/Findings: The following amendments were made to the patient's active medication list on file at [address]:   Prospective    Clarified PTA med list with SNF. PTA medication list was corrected to the following:     Prior to Admission Medications   Prescriptions Last Dose Informant Taking? Cetaphil topical cream  Transfer Papers Yes   Sig: Apply 1 Each to affected area as needed for Dry Skin. acetaminophen (TYLENOL) 325 mg tablet  Transfer Papers Yes   Sig: Take 650 mg by mouth every six (6) hours as needed for Pain. acetaminophen (TYLENOL) 500 mg tablet  Transfer Papers Yes   Sig: Take 1,000 mg by mouth three (3) times daily. amlodipine (NORVASC) 2.5 mg tablet  Transfer Papers Yes   Sig: Take 1 tablet by mouth daily. aspirin 81 mg chewable tablet  Transfer Papers Yes   Sig: Take 1 Tab by mouth daily. Indications: prevention of cerebrovascular accident   bumetanide (BUMEX) 1 mg tablet  Transfer Papers Yes   Sig: TAKE 1 TABLET BY MOUTH EVERY DAY   cholecalciferol (VITAMIN D3) 25 mcg (1,000 unit) cap  Transfer Papers Yes   Sig: Take 1,000 Units by mouth daily. codeine-butalbital-aspirin-caffeine (FIORINAL WITH CODEINE) 45--40 mg per capsule  Transfer Papers Yes   Sig: Take  by mouth every six (6) hours as needed for Pain. diclofenac (VOLTAREN) 1 % gel  Transfer Papers Yes   Sig: Apply 2 g to affected area three (3) times daily as needed for Pain (to both knees). emollient combination no. 114 (Eucerin Advanced Repair) crea  Transfer Papers Yes   Sig: Apply 1 Each to affected area as needed (skin dryness). ezetimibe (ZETIA) 10 mg tablet  Transfer Papers Yes   Sig: Take 10 mg by mouth. fenofibric acid (TRILIPIX ER) 45 mg capsule  Transfer Papers Yes   Sig: Take 45 mg by mouth daily. lisinopril (PRINIVIL, ZESTRIL) 5 mg tablet  Transfer Papers Yes   Sig: Take 1 tablet by mouth daily. mirtazapine (REMERON SOL-TAB) 15 mg disintegrating tablet  Transfer Papers Yes   Sig: Take 7.5 mg by mouth. ondansetron (ZOFRAN ODT) 8 mg disintegrating tablet  Transfer Papers Yes   Sig: Take 8 mg by mouth every eight (8) hours as needed. pantoprazole (PROTONIX) 40 mg tablet  Transfer Papers Yes   Sig: Take 40 mg by mouth two (2) times a day. potassium chloride SR (KLOR-CON 10) 10 mEq tablet  Transfer Papers Yes   Sig: Take  by mouth daily. sertraline (ZOLOFT) 50 mg tablet  Transfer Papers Yes   Sig: Take 50 mg by mouth daily.       Facility-Administered Medications: None        Thank you,  BEN Arechiga

## 2023-01-20 NOTE — ED PROVIDER NOTES
[id number]       Pt Name: Tessa Rogers  MRN: [id number]  [place] 1940  Date of evaluation: 1/20/2023  Provider: Doc Dimas MD   PCP: Adithya Bowles DO  Note Started: 2:00 PM 1/20/23     CHIEF COMPLAINT       Chief Complaint   Patient presents with    Leg Pain     Pt arrives via EMS from [address] [place] [address] with reports of right lower leg pain that started last night. Pt denies injuy/fall, chesst pain, sob, N/V/D. Ems stated hypotensive en route 88/58. Pt BP on arrival 125/107. Hypotension               HISTORY OF PRESENT ILLNESS: 1 or more elements      History From: Patient, History limited by: none     Tessa Rogers is a 78 y.o. female who presents with leg pain. She notes her right leg hurts very badly. She does not take anything for the pain. She notes her whole body generally hurts with the right leg is the worst.  Denies any further complaints including chest pain, shortness of breath, abdominal pain, nausea vomiting, dysuria, constipation or diarrhea. Denies any headache or dizziness. Notes that only medication she take her for her blood pressure and she has been compliant with them. Nursing Notes were all reviewed and agreed with or any disagreements were addressed in the HPI. REVIEW OF SYSTEMS        Positives and Pertinent negatives as per HPI. PAST HISTORY     Past Medical History:  Past Medical History:   Diagnosis Date    Abnormal cardiovascular stress test 04/18/08    Dr. Wilber German    Allergy, unspecified not elsewhere classified     Bell's palsy 02/2005    Left. Dr. Zena Hines    CAD (coronary artery disease)     Dr. Sekou Hagenh     [facility] Yuan [facility]fer    Chest pain 2002    Dr. Chidi Abarca    Chronic headache 2013    due to depression. Dr. Elena Uribe. Chronic pain     LEGS AND FEET; BACK UNDER RIGHT SHOULDER    Congenital eye disorder     Lazy eyes.   Dr. Meryl Schaffer Dementia ([facility] [address]     Dr. Reginald Rhodes. Depression 2011    Dr. Nadeen Land (psychologist). Dr. Molly Melo. Dr. Wesley Quiros. DJD (degenerative joint disease)     knees, low back. Dr. Bert Jessica. Dr. Nahid Sequeira. Dr. Hank Olmos    Gait instability 02/10/09    Dr. Daphne Cotto. Dr. Molly Melo. Gallstones     GERD (gastroesophageal reflux disease)     Hiatal hernia 1977    Hypercholesteremia     Hypertension     Ill-defined condition     \"JUST ONE NIGHT I WENT TO STAND UP AND I JUST COULDN'T STAND\"  PT NOT SURE WHY    Leg weakness 12/2009    Dr. Reginald Rhodes. Dr. Sophia Jimenez. Lower extremity venous stasis     Dr. Mague Sequeira    Lumbar spinal stenosis 12/2009    L3-5. Dr. Yonny Berkowitz. PARAPLEGIA SINCE 2012    Lymphedema 12/19/08    Dr. Matilde Santillan then Dr. Melissa Law    Memory loss 2013    Dr. Leti Shafer. Menopause     Migraine     Ocular. Dr. Sachin Chris    NORMA (obstructive sleep apnea) 01/24/07    Dr. Jennifer Staton. NO CPAP    Osteoporosis 02/10/09    Dr. Ted Elizondo    Pancreatitis 03/2011    Post-menopausal bleeding 07/2016    due to Dr. Elaine Fong Jr.    Rotator cuff injury     Left. Dr. Bryan Diehl; chronic bilateral    Seborrheic keratosis 2005    Dr. Familia Tinajero 02/05/09    Dr. Francis Pulido    Tremor 2013    Dr. Molly Melo. Unspecified vitamin D deficiency 07/2009    Urinary incontinence 2013    Dr. Suyapa Morel    Venous insufficiency 1998    R>L. with chronic leg edema. Dr. Melissa Law       Past Surgical History:  Past Surgical History:   Procedure Laterality Date    HX HEART CATHETERIZATION  06/2008    Dr. Lamont Weinberg. due to abnormal Stress Test    HX MAR AND BSO  09/14/2016    Dr. Lovely Vera Jr     HYSTEROSCOPIC MYOMECTOMY  07/27/2016    D AND C WITH SYMPHION, RESECTION OF ENDOMETRIAL TISSUE. benign. due to postmenopausal bleeding. Dr. Noris Zepeda.        Family History:  Family History Problem Relation Age of Onset    Hypertension Mother     OSTEOARTHRITIS Mother     Dementia Mother     Cancer Father         liver    Heart Attack Father     Other Father         kidney stones    Stroke Maternal Grandmother     Hypertension Sister     OSTEOARTHRITIS Sister     Diabetes Paternal Aunt     Anesth Problems Neg Hx        Social History:  Social History     Tobacco Use    Smoking status: Former     Packs/day: 0.25     Years: 0.50     Pack years: 0.13     Types: Cigarettes     Quit date: 1962     Years since quittin.0    Smokeless tobacco: Never    Tobacco comments:     Pt smoked 3-4 cigs x 9 mos. Pt lived with smoker dad x 40 yrs   Substance Use Topics    Alcohol use: No    Drug use: No       Allergies: Allergies   Allergen Reactions    Aristocort [Triamcinolone] Hives     Aristocort shot    Codeine Itching    Lasix [Furosemide] Itching    Bupropion Other (comments)     Severe headache    Darvocet A500 [Propoxyphene N-Acetaminophen] Other (comments)     headache    Influenza Virus Vaccine Whole Other (comments)     pharyngitis    Mevacor [Lovastatin] Other (comments)     myalgias    Percocet [Oxycodone-Acetaminophen] Itching     Worse headache    Rhinocort Other (comments)     nosebleed    Simvastatin Other (comments)     myalgias       CURRENT MEDICATIONS      Current Discharge Medication List        CONTINUE these medications which have NOT CHANGED    Details   !! acetaminophen (TYLENOL) 500 mg tablet Take 1,000 mg by mouth three (3) times daily. fenofibric acid (TRILIPIX ER) 45 mg capsule Take 45 mg by mouth daily. !! acetaminophen (TYLENOL) 325 mg tablet Take 650 mg by mouth every six (6) hours as needed for Pain.      emollient combination no. 114 (Eucerin Advanced Repair) crea Apply 1 Each to affected area as needed (skin dryness). Cetaphil topical cream Apply 1 Each to affected area as needed for Dry Skin.       diclofenac (VOLTAREN) 1 % gel Apply 2 g to affected area three (3) times daily as needed for Pain (to both knees). ezetimibe (ZETIA) 10 mg tablet Take 10 mg by mouth. ondansetron (ZOFRAN ODT) 8 mg disintegrating tablet Take 8 mg by mouth every eight (8) hours as needed. cholecalciferol (VITAMIN D3) 25 mcg (1,000 unit) cap Take 1,000 Units by mouth daily. codeine-butalbital-aspirin-caffeine (FIORINAL WITH CODEINE) 59--40 mg per capsule Take  by mouth every six (6) hours as needed for Pain. sertraline (ZOLOFT) 50 mg tablet Take 50 mg by mouth daily. potassium chloride SR (KLOR-CON 10) 10 mEq tablet Take  by mouth daily. pantoprazole (PROTONIX) 40 mg tablet Take 40 mg by mouth two (2) times a day. mirtazapine (REMERON SOL-TAB) 15 mg disintegrating tablet Take 7.5 mg by mouth. aspirin 81 mg chewable tablet Take 1 Tab by mouth daily. Indications: prevention of cerebrovascular accident  Qty: 90 Tab, Refills: 3      amlodipine (NORVASC) 2.5 mg tablet Take 1 tablet by mouth daily. Qty: 30 tablet, Refills: 11    Associated Diagnoses: Hypertension      bumetanide (BUMEX) 1 mg tablet TAKE 1 TABLET BY MOUTH EVERY DAY  Qty: 30 tablet, Refills: 11      lisinopril (PRINIVIL, ZESTRIL) 5 mg tablet Take 1 tablet by mouth daily. Qty: 30 tablet, Refills: 11       !! - Potential duplicate medications found. Please discuss with provider. SCREENINGS               No data recorded         PHYSICAL EXAM      ED Triage Vitals [01/20/23 1305]   ED Encounter Vitals Group      BP       Pulse (Heart Rate) 77      Resp Rate 18      Temp 99.3 °F (37.4 °C)      Temp src       O2 Sat (%) 95 %      Weight 290 lb      Height 5'        Physical Exam  Vitals reviewed. Constitutional:       Appearance: Normal appearance. HENT:      Head: Normocephalic and atraumatic. Eyes:      Extraocular Movements: Extraocular movements intact. Pupils: Pupils are equal, round, and reactive to light.    Cardiovascular:      Rate and Rhythm: Normal rate and regular rhythm. Pulses: Normal pulses. Pulmonary:      Effort: Pulmonary effort is normal.      Breath sounds: Normal breath sounds. Abdominal:      General: Abdomen is flat. Palpations: Abdomen is soft. Tenderness: There is abdominal tenderness. Musculoskeletal:      Cervical back: Normal range of motion. Comments: Diffuse lower extremity tenderness to palpation of bilateral upper and lower extremities. Right leg is shortened and externally rotated. Neurovascular intact. DPs 2+   Skin:     General: Skin is warm. Findings: No lesion. Neurological:      General: No focal deficit present. Mental Status: She is alert and oriented to person, place, and time. Psychiatric:         Mood and Affect: Mood normal.         Behavior: Behavior normal.        DIAGNOSTIC RESULTS   LABS:     Recent Results (from the past 12 hour(s))   CBC WITH AUTOMATED DIFF    Collection Time: 01/22/23  3:34 AM   Result Value Ref Range    WBC 5.7 3.6 - 11.0 K/uL    RBC 3.38 (L) 3.80 - 5.20 M/uL    HGB 10.3 (L) 11.5 - 16.0 g/dL    HCT 33.2 (L) 35.0 - 47.0 %    MCV 98.2 80.0 - 99.0 FL    MCH 30.5 26.0 - 34.0 PG    MCHC 31.0 30.0 - 36.5 g/dL    RDW 15.1 (H) 11.5 - 14.5 %    PLATELET 903 426 - 489 K/uL    MPV 10.1 8.9 - 12.9 FL    NRBC 0.0 0  WBC    ABSOLUTE NRBC 0.00 0.00 - 0.01 K/uL    NEUTROPHILS 39 32 - 75 %    LYMPHOCYTES 47 12 - 49 %    MONOCYTES 8 5 - 13 %    EOSINOPHILS 5 0 - 7 %    BASOPHILS 1 0 - 1 %    IMMATURE GRANULOCYTES 0 0.0 - 0.5 %    ABS. NEUTROPHILS 2.2 1.8 - 8.0 K/UL    ABS. LYMPHOCYTES 2.7 0.8 - 3.5 K/UL    ABS. MONOCYTES 0.5 0.0 - 1.0 K/UL    ABS. EOSINOPHILS 0.3 0.0 - 0.4 K/UL    ABS. BASOPHILS 0.0 0.0 - 0.1 K/UL    ABS. IMM.  GRANS. 0.0 0.00 - 0.04 K/UL    DF AUTOMATED     METABOLIC PANEL, COMPREHENSIVE    Collection Time: 01/22/23  3:34 AM   Result Value Ref Range    Sodium 142 136 - 145 mmol/L    Potassium 4.0 3.5 - 5.1 mmol/L    Chloride 112 (H) 97 - 108 mmol/L    CO2 26 21 - 32 mmol/L    Anion gap 4 (L) 5 - 15 mmol/L    Glucose 98 65 - 100 mg/dL    BUN 13 6 - 20 MG/DL    Creatinine 0.83 0.55 - 1.02 MG/DL    BUN/Creatinine ratio 16 12 - 20      eGFR >60 >60 ml/min/1.73m2    Calcium 8.9 8.5 - 10.1 MG/DL    Bilirubin, total 0.4 0.2 - 1.0 MG/DL    ALT (SGPT) 11 (L) 12 - 78 U/L    AST (SGOT) 7 (L) 15 - 37 U/L    Alk. phosphatase 37 (L) 45 - 117 U/L    Protein, total 5.8 (L) 6.4 - 8.2 g/dL    Albumin 3.1 (L) 3.5 - 5.0 g/dL    Globulin 2.7 2.0 - 4.0 g/dL    A-G Ratio 1.1 1.1 - 2.2          EKG: If performed, independent interpretation documented below in the MDM section     RADIOLOGY:  Non-plain film images such as CT, Ultrasound and MRI are read by the radiologist. Plain radiographic images are visualized and preliminarily interpreted by the ED Provider with the findings documented in the MDM section. Interpretation per the Radiologist below, if available at the time of this note:     DUPLEX LOWER EXT VENOUS RIGHT    Result Date: 1/21/2023  · No evidence of acute deep vein thrombosis in the right common femoral, femoral, popliteal, posterior tibial, and peroneal veins. The veins were imaged in the transverse and longitudinal planes. The vessels showed normal color filling and compressibility. Doppler interrogation showed phasic and spontaneous flow.          PROCEDURES   Unless otherwise noted below, none  Procedures     CRITICAL CARE TIME   none    EMERGENCY DEPARTMENT COURSE and DIFFERENTIAL DIAGNOSIS/MDM   Vitals:    Vitals:    01/21/23 0048 01/21/23 0905 01/21/23 2009 01/22/23 0526   BP: 110/68 104/70 98/61 116/62   Pulse: 82 78 83 63   Resp: 16 20 20 18   Temp: 98.3 °F (36.8 °C) 97.5 °F (36.4 °C) 98.4 °F (36.9 °C) 98.4 °F (36.9 °C)   SpO2: 98% 100% 100% 100%   Weight:       Height:            Patient was given the following medications:  Medications   sodium chloride (NS) flush 5-40 mL (10 mL IntraVENous Given 1/22/23 0654)   sodium chloride (NS) flush 5-40 mL (has no administration in time range)   acetaminophen (TYLENOL) tablet 650 mg (650 mg Oral Given 1/21/23 1029)     Or   acetaminophen (TYLENOL) suppository 650 mg ( Rectal See Alternative 1/21/23 1029)   polyethylene glycol (MIRALAX) packet 17 g (17 g Oral Given 1/21/23 1029)   promethazine (PHENERGAN) tablet 12.5 mg (has no administration in time range)     Or   ondansetron (ZOFRAN) injection 4 mg (has no administration in time range)   bisacodyL (DULCOLAX) suppository 10 mg (has no administration in time range)   enoxaparin (LOVENOX) injection 40 mg (40 mg SubCUTAneous Given 1/21/23 1024)   melatonin tablet 3 mg (has no administration in time range)   hydrALAZINE (APRESOLINE) 20 mg/mL injection 20 mg (has no administration in time range)   cefTRIAXone (ROCEPHIN) 1 g in 0.9% sodium chloride (MBP/ADV) 50 mL MBP (1 g IntraVENous New Bag 1/21/23 1800)   aspirin chewable tablet 81 mg (81 mg Oral Given 1/21/23 1024)   ezetimibe (ZETIA) tablet 10 mg (10 mg Oral Given 1/21/23 1024)   fenofibrate (LOFIBRA) tablet 54 mg (54 mg Oral Given 1/21/23 1029)   mirtazapine (REMERON) tablet 7.5 mg (7.5 mg Oral Given 1/21/23 2154)   pantoprazole (PROTONIX) tablet 40 mg (40 mg Oral Given 1/22/23 0657)   sertraline (ZOLOFT) tablet 50 mg (50 mg Oral Given 1/21/23 1024)   morphine injection 2 mg (has no administration in time range)   calcium carbonate (TUMS) chewable tablet 200 mg [elemental] (200 mg Oral Given 1/21/23 2154)   sodium chloride 0.9 % bolus infusion 1,000 mL (0 mL IntraVENous IV Completed 1/20/23 1537)     Followed by   sodium chloride 0.9 % bolus infusion 1,000 mL (0 mL IntraVENous IV Completed 1/20/23 1744)     Followed by   sodium chloride 0.9 % bolus infusion 1,000 mL (0 mL IntraVENous IV Completed 1/20/23 1949)     Followed by   sodium chloride 0.9 % bolus infusion 945 mL (0 mL IntraVENous IV Completed 1/20/23 1949)   ketorolac (TORADOL) injection 15 mg (15 mg IntraVENous Given 1/20/23 1536)   cefTRIAXone (ROCEPHIN) 1 g in 0.9% sodium chloride (MBP/ADV) 50 mL MBP (0 g IntraVENous IV Completed 1/20/23 2464)   iopamidoL (ISOVUE-370) 370 mg iodine /mL (76 %) injection 100 mL (100 mL IntraVENous Given 1/20/23 2311)   potassium chloride (K-DUR, KLOR-CON M20) SR tablet 40 mEq (40 mEq Oral Given 1/21/23 1241)       Medical Decision Making  78-year-old female with history of GERD, hypertension, for myalgia presents with right leg and full body pain. Vitals on arrival show relative hypotension with maps between 65 and 70. She is near febrile at 99.3. She endorses pain with palpation in nearly any part of her body including her chest, abdomen and all muscles. She is neurovascular intact in bilateral upper and lower extremities with good pulses and good perfusion. She has normal sensation in all extremities. Her right leg seems shortened externally rotated with concerns for a hip dislocation however she can range the hip but denies any pain with ranging. We will get a hip x-ray to evaluate for this. She notes her right leg mostly hurts but she has diffuse myalgias which she is concerned for a infectious etiology. Obtain blood cultures and infectious work-up including viral swabs, chest x-ray. We will start aggressive IV fluid resuscitation for her hypotension. Toradol for her pain. Amount and/or Complexity of Data Reviewed  Labs: ordered. Radiology: ordered. ECG/medicine tests: ordered. Risk  Prescription drug management. Decision regarding hospitalization. ED Course as of 01/22/23 0932   Fri Jan 20, 2023   1623 Patient responded well to fluids, atill a bit hypotensive. Got toradol and pain is a bit better. Lactate normal.  Viral swabs negative, no PNA. UA consistent with possible UTI, has nausea. Will admit to complixated uti given her persistnet hypotension and pamela [JS]      ED Course User Index  [JS] Jean Oliver MD         FINAL IMPRESSION     1. Acute cystitis without hematuria    2.  PAMELA (acute kidney injury) [facility])          DISPOSITION/PLAN   Ray Dupree  results have been reviewed with her. She has been counseled regarding her diagnosis, treatment, and plan. She verbally conveys understanding and agreement of the signs, symptoms, diagnosis, treatment and prognosis and additionally agrees to follow up as discussed. She also agrees with the care-plan and conveys that all of her questions have been answered. I have also provided discharge instructions for her that include: educational information regarding their diagnosis and treatment, and list of reasons why they would want to return to the ED prior to their follow-up appointment, should her condition change. CLINICAL IMPRESSION    Admit Note: Pt is being admitted by Dr. Saeid Peoples. The results of their tests and reason(s) for their admission have been discussed with pt and/or available family. They convey agreement and understanding for the need to be admitted and for the admission diagnosis. PATIENT REFERRED TO:  Follow-up Information    None           DISCHARGE MEDICATIONS:  Current Discharge Medication List            DISCONTINUED MEDICATIONS:  Current Discharge Medication List          I am the Primary Clinician of Record. Gage Phillips MD (electronically signed)    (Please note that parts of this dictation were completed with voice recognition software. Quite often unanticipated grammatical, syntax, homophones, and other interpretive errors are inadvertently transcribed by the computer software. Please disregards these errors.  Please excuse any errors that have escaped final proofreading.)

## 2023-01-21 ENCOUNTER — APPOINTMENT (OUTPATIENT)
Dept: ULTRASOUND IMAGING | Age: 83
DRG: 556 | End: 2023-01-21
Attending: INTERNAL MEDICINE
Payer: MEDICARE

## 2023-01-21 LAB
ACC. NO. FROM MICRO ORDER, ACCP: ABNORMAL
ACINETOBACTER CALCOACETICUS-BAUMANII COMPLEX, ACBCX: NOT DETECTED
ALBUMIN SERPL-MCNC: 3 G/DL (ref 3.5–5)
ALBUMIN/GLOB SERPL: 1.2 (ref 1.1–2.2)
ALP SERPL-CCNC: 36 U/L (ref 45–117)
ALT SERPL-CCNC: 9 U/L (ref 12–78)
ANION GAP SERPL CALC-SCNC: 7 MMOL/L (ref 5–15)
AST SERPL-CCNC: 10 U/L (ref 15–37)
ATRIAL RATE: 66 BPM
BACTEROIDES FRAGILIS, BFRA: NOT DETECTED
BASOPHILS # BLD: 0 K/UL (ref 0–0.1)
BASOPHILS NFR BLD: 1 % (ref 0–1)
BILIRUB SERPL-MCNC: 0.3 MG/DL (ref 0.2–1)
BIOFIRE COMMENT, BCIDPF: ABNORMAL
BUN SERPL-MCNC: 22 MG/DL (ref 6–20)
BUN/CREAT SERPL: 20 (ref 12–20)
C GLABRATA DNA VAG QL NAA+PROBE: NOT DETECTED
CALCIUM SERPL-MCNC: 8.3 MG/DL (ref 8.5–10.1)
CALCULATED P AXIS, ECG09: 19 DEGREES
CALCULATED R AXIS, ECG10: -14 DEGREES
CALCULATED T AXIS, ECG11: 0 DEGREES
CANDIDA ALBICANS: NOT DETECTED
CANDIDA AURIS, CAAU: NOT DETECTED
CANDIDA KRUSEI, CKRP: NOT DETECTED
CANDIDA PARAPSILOSIS, CPAUP: NOT DETECTED
CANDIDA TROPICALIS, CTROP: NOT DETECTED
CHLORIDE SERPL-SCNC: 111 MMOL/L (ref 97–108)
CO2 SERPL-SCNC: 24 MMOL/L (ref 21–32)
CREAT SERPL-MCNC: 1.1 MG/DL (ref 0.55–1.02)
CRYPTO NEOFORMANS/GATTII, CRYNEG: NOT DETECTED
DIAGNOSIS, 93000: NORMAL
DIFFERENTIAL METHOD BLD: ABNORMAL
ENTEROBACTER CLOACAE COMPLEX, ECCP: NOT DETECTED
ENTEROBACTERALES SP. , ENBLS: NOT DETECTED
ENTEROCOCCUS FAECALIS, ENFA: NOT DETECTED
ENTEROCOCCUS FAECIUM, ENFAM: NOT DETECTED
EOSINOPHIL # BLD: 0.3 K/UL (ref 0–0.4)
EOSINOPHIL NFR BLD: 4 % (ref 0–7)
ERYTHROCYTE [DISTWIDTH] IN BLOOD BY AUTOMATED COUNT: 15.1 % (ref 11.5–14.5)
ESCHERICHIA COLI: NOT DETECTED
GLOBULIN SER CALC-MCNC: 2.6 G/DL (ref 2–4)
GLUCOSE SERPL-MCNC: 97 MG/DL (ref 65–100)
HAEMOPHILUS INFLUENZAE, HMI: NOT DETECTED
HCT VFR BLD AUTO: 33.4 % (ref 35–47)
HGB BLD-MCNC: 10.2 G/DL (ref 11.5–16)
IMM GRANULOCYTES # BLD AUTO: 0 K/UL (ref 0–0.04)
IMM GRANULOCYTES NFR BLD AUTO: 0 % (ref 0–0.5)
KLEBSIELLA AEROGENES, KLAE: NOT DETECTED
KLEBSIELLA OXYTOCA: NOT DETECTED
KLEBSIELLA PNEUMONIAE GROUP, KPPG: NOT DETECTED
LISTERIA MONOCYTOGENES, LMONP: NOT DETECTED
LYMPHOCYTES # BLD: 2.6 K/UL (ref 0.8–3.5)
LYMPHOCYTES NFR BLD: 39 % (ref 12–49)
MCH RBC QN AUTO: 29.8 PG (ref 26–34)
MCHC RBC AUTO-ENTMCNC: 30.5 G/DL (ref 30–36.5)
MCV RBC AUTO: 97.7 FL (ref 80–99)
MONOCYTES # BLD: 0.5 K/UL (ref 0–1)
MONOCYTES NFR BLD: 7 % (ref 5–13)
NEISSERIA MENINGITIDIS, NMNI: NOT DETECTED
NEUTS SEG # BLD: 3.2 K/UL (ref 1.8–8)
NEUTS SEG NFR BLD: 49 % (ref 32–75)
NRBC # BLD: 0 K/UL (ref 0–0.01)
NRBC BLD-RTO: 0 PER 100 WBC
P-R INTERVAL, ECG05: 168 MS
PLATELET # BLD AUTO: 267 K/UL (ref 150–400)
PMV BLD AUTO: 10.5 FL (ref 8.9–12.9)
POTASSIUM SERPL-SCNC: 3.4 MMOL/L (ref 3.5–5.1)
PROT SERPL-MCNC: 5.6 G/DL (ref 6.4–8.2)
PROTEUS, PRP: NOT DETECTED
PSEUDOMONAS AERUGINOSA: NOT DETECTED
Q-T INTERVAL, ECG07: 436 MS
QRS DURATION, ECG06: 108 MS
QTC CALCULATION (BEZET), ECG08: 457 MS
RBC # BLD AUTO: 3.42 M/UL (ref 3.8–5.2)
RESISTANT GENE SPACE, REGENE: ABNORMAL
SALMONELLA, SALMO: NOT DETECTED
SERRATIA MARCESCENS: NOT DETECTED
SODIUM SERPL-SCNC: 142 MMOL/L (ref 136–145)
STAPH EPIDERMIDIS, STEP: NOT DETECTED
STAPH LUGDUNENSIS, STALUG: NOT DETECTED
STAPHYLOCOCCUS AUREUS: NOT DETECTED
STAPHYLOCOCCUS, STAPP: DETECTED
STENO MALTOPHILIA, STMA: NOT DETECTED
STREPTOCOCCUS , STPSP: NOT DETECTED
STREPTOCOCCUS AGALACTIAE (GROUP B): NOT DETECTED
STREPTOCOCCUS PNEUMONIAE , SPNP: NOT DETECTED
STREPTOCOCCUS PYOGENES (GROUP A), SPYOP: NOT DETECTED
VENTRICULAR RATE, ECG03: 66 BPM
WBC # BLD AUTO: 6.5 K/UL (ref 3.6–11)

## 2023-01-21 PROCEDURE — 74011250637 HC RX REV CODE- 250/637: Performed by: STUDENT IN AN ORGANIZED HEALTH CARE EDUCATION/TRAINING PROGRAM

## 2023-01-21 PROCEDURE — 93971 EXTREMITY STUDY: CPT

## 2023-01-21 PROCEDURE — 74011250637 HC RX REV CODE- 250/637: Performed by: INTERNAL MEDICINE

## 2023-01-21 PROCEDURE — 74011250636 HC RX REV CODE- 250/636: Performed by: INTERNAL MEDICINE

## 2023-01-21 PROCEDURE — 80053 COMPREHEN METABOLIC PANEL: CPT

## 2023-01-21 PROCEDURE — 74011000258 HC RX REV CODE- 258: Performed by: INTERNAL MEDICINE

## 2023-01-21 PROCEDURE — 97161 PT EVAL LOW COMPLEX 20 MIN: CPT

## 2023-01-21 PROCEDURE — 65270000046 HC RM TELEMETRY

## 2023-01-21 PROCEDURE — 74011250637 HC RX REV CODE- 250/637: Performed by: GENERAL ACUTE CARE HOSPITAL

## 2023-01-21 PROCEDURE — 36415 COLL VENOUS BLD VENIPUNCTURE: CPT

## 2023-01-21 PROCEDURE — 97530 THERAPEUTIC ACTIVITIES: CPT

## 2023-01-21 PROCEDURE — 85025 COMPLETE CBC W/AUTO DIFF WBC: CPT

## 2023-01-21 PROCEDURE — 74011000250 HC RX REV CODE- 250: Performed by: INTERNAL MEDICINE

## 2023-01-21 RX ORDER — POTASSIUM CHLORIDE 20 MEQ/1
40 TABLET, EXTENDED RELEASE ORAL
Status: COMPLETED | OUTPATIENT
Start: 2023-01-21 | End: 2023-01-21

## 2023-01-21 RX ORDER — CALCIUM CARBONATE 200(500)MG
200 TABLET,CHEWABLE ORAL
Status: DISCONTINUED | OUTPATIENT
Start: 2023-01-21 | End: 2023-01-24 | Stop reason: HOSPADM

## 2023-01-21 RX ADMIN — CEFTRIAXONE 1 G: 1 INJECTION, POWDER, FOR SOLUTION INTRAMUSCULAR; INTRAVENOUS at 18:00

## 2023-01-21 RX ADMIN — SODIUM CHLORIDE, PRESERVATIVE FREE 10 ML: 5 INJECTION INTRAVENOUS at 18:36

## 2023-01-21 RX ADMIN — SERTRALINE 50 MG: 50 TABLET, FILM COATED ORAL at 10:24

## 2023-01-21 RX ADMIN — EZETIMIBE 10 MG: 10 TABLET ORAL at 10:24

## 2023-01-21 RX ADMIN — MIRTAZAPINE 7.5 MG: 15 TABLET, FILM COATED ORAL at 21:54

## 2023-01-21 RX ADMIN — SODIUM CHLORIDE, PRESERVATIVE FREE 10 ML: 5 INJECTION INTRAVENOUS at 21:55

## 2023-01-21 RX ADMIN — ASPIRIN 81 MG: 81 TABLET, CHEWABLE ORAL at 10:24

## 2023-01-21 RX ADMIN — POTASSIUM CHLORIDE 40 MEQ: 1500 TABLET, EXTENDED RELEASE ORAL at 12:41

## 2023-01-21 RX ADMIN — POLYETHYLENE GLYCOL 3350 17 G: 17 POWDER, FOR SOLUTION ORAL at 10:29

## 2023-01-21 RX ADMIN — PANTOPRAZOLE SODIUM 40 MG: 40 TABLET, DELAYED RELEASE ORAL at 10:24

## 2023-01-21 RX ADMIN — FENOFIBRATE 54 MG: 54 TABLET ORAL at 10:29

## 2023-01-21 RX ADMIN — ENOXAPARIN SODIUM 40 MG: 100 INJECTION SUBCUTANEOUS at 10:24

## 2023-01-21 RX ADMIN — CALCIUM CARBONATE (ANTACID) CHEW TAB 500 MG 200 MG: 500 CHEW TAB at 21:54

## 2023-01-21 RX ADMIN — ACETAMINOPHEN 650 MG: 325 TABLET ORAL at 10:29

## 2023-01-21 NOTE — ED NOTES
Assumed care of pt at this time. Pt arrives complaining of leg pain that started last night pt denies injury. Pt on monitor x3 with call bell in reach. 1430: Pt resting comfortably in stretcher with no complaints. Pt on monitor x3 with call bell in reach. 1530: Pt resting in stretcher. Pt on monitor x3 with call bell in reach. 1645: Pt resting and on monitor x3 with call bell in reach. 1745: Pt resting with no complaints. Pt on monitor x3 with call bell in reach. 1845: Pt resting with no complaints and remains on monitor x3 with call bell in reach. 1905: Bedside and Verbal shift change report given to Noah Salazar RN (oncoming nurse) by Davin Josue RN (offgoing nurse). Report included the following information SBAR, ED Summary, MAR, and Recent Results.

## 2023-01-21 NOTE — PROGRESS NOTES
Hospitalist Progress Note    NAME: Bea Ackerman   :  1940   MRN:  [id number]       Assessment / Plan:    Right foot and calf pain POA  Diminished right dorsalis pedis, posterior tibial pulses POA  Probable PAD POA  Came to the emergency room because her right foot and ankle has been hurting overnight  No known PAD  No trauma to the leg  No clear skin changes, right middle toe bandaged  Could not clearly palpate dorsalis pedis or posterior tibial pulses bilaterally              2+ femoral pulses bilaterally  Doppler ultrasound not able to detect right dorsalis pedis, posterior tibials pulses  Able detect a left dorsalis pedis, left posterior tibial very faint  Able to flex toes and feet vs resistance in both feet  No numbness in feet  Mild pain on motion of ankle and knee  Differential              Ischemic foot pain              Fracture less likely              ? DVT              No clear evidence of cellulitis              ? Radicular pain, denies back pain              ? OA  IVF  Vascular surgery consulted, Limb not threatened, CTA shows chronic multilevel PAD, No indication for acute intervention, will restart diet  LE dopplers neg for DVT  Right ankle and tib-fib x-rays neg for fracture/dislocation  Abdominal aortic CTA with LE runoff Focus of severe stenosis in the distal one third of the SFA on the right. Moderate to severe stenosis distal one third of the superficial femoral artery on the left. No definite in-line flow to the malleoli is demonstrated. Multifocal stenoses of the infrapopliteal vessels is suggested. GPC in blood Cx  ?contaminant   Repeat Cx in AM  F/up final results  Keep same Abx, pt stable    Possible UTI POA  Abnormal UA > 100 WBC, 0-5 RBCs, 2 + bacteria  No SIRS criteria  No urinary symptoms  Check blood and urine cultures  Rocephin      ?  Mild acute kidney injury POA BUN/creat 31 and 1.36  Last baseline creat 0.8 to 0.96 in 2018  No more recent labs  S/p several liter IVF bolus in ED  Recheck creatinine in AM     Essential HTN POA  Hyperlipidemia POA  CAD POA  Relative hypotension 93/58 in ED POA  Hold norvasc and lisinopril  S/p IVF  Continue zetia, fenofibrate, ASA  PRN hydralazine     Mild cognitive impairment POA  Current alert, appropriate  Watch for delirium  Continue remeron     GERD  Continue PPI     Morbid Obesity POA Body mass index is 56.64 kg/m². Given the patient's current clinical presentation, I have a high level of concern for decompensation if discharged from the emergency department. My assessment of this patient's clinical condition and my plan of care is as noted above. DVT prophylaxis with lovenox     Code status: full code  NOK: Brother Diana Smtih     Recommended Disposition: SNF/LTC  Anticipated Discharge Date:  24-48 hours   JUAN Barriers: Cx       Subjective:     Discussed with RN events overnight. Right LE pain  No CP  No SOB  afebrile    Review of Systems:  Symptom Y/N Comments  Symptom Y/N Comments   Fever/Chills    Chest Pain     Poor Appetite    Edema     Cough    Abdominal Pain     Sputum    Joint Pain     SOB/LEIVA    Pruritis/Rash     Nausea/vomit    Tolerating PT/OT     Diarrhea    Tolerating Diet     Constipation    Other       PO intake: No data found. Wt Readings from Last 10 Encounters:   01/20/23 131.5 kg (290 lb)   05/12/20 131.5 kg (290 lb)   07/11/18 131.5 kg (290 lb)   05/01/18 131.5 kg (290 lb)   01/27/18 131.5 kg (290 lb)   01/02/18 119.7 kg (264 lb)   11/14/17 120.1 kg (264 lb 12.4 oz)   08/11/17 123.8 kg (273 lb)   08/03/17 123.8 kg (273 lb)   07/25/17 123.8 kg (273 lb)       Objective:     VITALS:   Last 24hrs VS reviewed since prior progress note.  Most recent are:  Patient Vitals for the past 24 hrs:   Temp Pulse Resp BP SpO2   01/21/23 0905 97.5 °F (36.4 °C) 78 20 104/70 100 %   01/21/23 0048 98.3 °F (36.8 °C) 82 16 110/68 98 %   01/20/23 2153 98.4 °F (36.9 °C) 88 17 102/64 98 %   01/20/23 2048 -- 99 -- -- 96 %   01/20/23 2045 -- 91 -- 116/64 --   01/20/23 1946 98.7 °F (37.1 °C) 78 16 -- 98 %   01/20/23 1945 -- 84 -- 101/78 --   01/20/23 1915 -- 72 -- 105/81 99 %   01/20/23 1909 -- 70 -- 119/70 95 %   01/20/23 1815 -- 86 -- 101/74 99 %   01/20/23 1715 -- 88 11 129/69 98 %   01/20/23 1620 -- 70 15 125/74 --   01/20/23 1531 -- 72 16 (!) 108/56 99 %       Intake/Output Summary (Last 24 hours) at 1/21/2023 1449  Last data filed at 1/20/2023 1949  Gross per 24 hour   Intake 3995 ml   Output --   Net 3995 ml        I had a face to face encounter, and independently examined this patient as outlined below:    PHYSICAL EXAM:  General:    No distress     HEENT: Atraumatic, anicteric sclerae, pink conjunctivae, MMM  Neck:  Supple, symmetrical  Lungs:   CTA. No Wheezing/Rhonchi. No rales. No tenderness. No Accessory muscle use. CVS:   Regular rhythm. No murmur. No JVD . Non palpable DP pulses b/l  GI/:   Soft. NT. ND. BS normal  Extremities: No edema. No cyanosis. No clubbing. Skin:     Not pale. Not Jaundiced. No rashes   Psych:  Not depressed. Not anxious or agitated. Neurologic: Alert and oriented. EOMs intact. No facial asymmetry. No slurred speech. Symmetrical strength,       Labs     I reviewed today's most current labs and imaging studies. Pertinent labs include:  Recent Labs     01/21/23  0011 01/20/23  1401   WBC 6.5 6.6   HGB 10.2* 11.4*   HCT 33.4* 36.5    326     Recent Labs     01/21/23  0011 01/20/23  1401    141   K 3.4* 3.7   * 105   CO2 24 26   GLU 97 95   BUN 22* 31*   CREA 1.10* 1.36*   CA 8.3* 9.4   ALB 3.0* 3.6   TBILI 0.3 0.7   ALT 9* 10*     XR HIP RT W OR WO PELV 2-3 VWS    Result Date: 1/20/2023  No acute abnormality. XR TIB/FIB RT    Result Date: 1/21/2023  No acute fracture or dislocation. Peripheral arterial disease and dystrophic calcifications. .    XR ANKLE RT AP/LAT    Result Date: 1/21/2023  Technical factors as above, with chronic fractures of the distal tibia and fibula. Diffuse soft tissue swelling osteopenia with no definite acute process. If persistent clinical concern, consider 3 view ankle series with better positioning, or CT/MRI. CTA ABD ART W RUNOFF W WO CONT    Result Date: 1/21/2023  Peripheral arterial disease. Focus of severe stenosis in the distal one third of the SFA on the right. Moderate to severe stenosis distal one third of the superficial femoral artery on the left. No definite in-line flow to the malleoli is demonstrated. Multifocal stenoses of the infrapopliteal vessels is suggested. XR CHEST PORT    Result Date: 1/20/2023  No acute process on portable chest.     XR TIB/FIB RT    Result Date: 1/21/2023  No acute fracture or dislocation. Peripheral arterial disease and dystrophic calcifications. .    XR ANKLE RT AP/LAT    Result Date: 1/21/2023  Technical factors as above, with chronic fractures of the distal tibia and fibula. Diffuse soft tissue swelling osteopenia with no definite acute process. If persistent clinical concern, consider 3 view ankle series with better positioning, or CT/MRI. CTA ABD ART W RUNOFF W WO CONT    Result Date: 1/21/2023  Peripheral arterial disease. Focus of severe stenosis in the distal one third of the SFA on the right. Moderate to severe stenosis distal one third of the superficial femoral artery on the left. No definite in-line flow to the malleoli is demonstrated. Multifocal stenoses of the infrapopliteal vessels is suggested.        All Micro Results       Procedure Component Value Units Date/Time    CULTURE, BLOOD [[id number]]  (Abnormal) Collected: 01/20/23 1400    Order Status: Completed Specimen: Blood Updated: 01/21/23 1432     Special Requests: NO SPECIAL REQUESTS        Culture result:       Gram positive cocci IN CLUSTERS GROWING IN 1 OF 2 BOTTLES DRAWN SITE=LAC                  REMAINING BOTTLE(S) HAS/HAVE NO GROWTH SO FAR          BLOOD CULTURE ID PANEL [[id number]] Collected: 01/20/23 1400    Order Status: No result Updated: 01/21/23 1352    CULTURE, BLOOD [[id number]] Collected: 01/20/23 1529    Order Status: Completed Specimen: Blood Updated: 01/21/23 1115     Special Requests: NO SPECIAL REQUESTS        Culture result: NO GROWTH AFTER 19 HOURS       MICRO TRACKING [[id number]] Collected: 01/20/23 1401    Order Status: No result Updated: 01/21/23 0953    CULTURE, URINE [[id number]] Collected: 01/20/23 1544    Order Status: No result Updated: 01/20/23 2050    COVID-19 RAPID TEST [[id number]] Collected: 01/20/23 1401    Order Status: Completed Specimen: Nasopharyngeal Updated: 01/20/23 1446     Specimen source Nasopharyngeal        COVID-19 rapid test Not detected        Comment: Rapid Abbott ID Now       Rapid NAAT:  The specimen is NEGATIVE for SARS-CoV-2, the novel coronavirus associated with COVID-19. Negative results should be treated as presumptive and, if inconsistent with clinical signs and symptoms or necessary for patient management, should be tested with an alternative molecular assay. Negative results do not preclude SARS-CoV-2 infection and should not be used as the sole basis for patient management decisions. This test has been authorized by the FDA under an Emergency Use Authorization (EUA) for use by authorized laboratories.    Fact sheet for Healthcare Providers:  http://www.christofer.margret/  Fact sheet for Patients: http://www.christofer.margret/       Methodology: Isothermal Nucleic Acid Amplification                   Current Medications:     Current Facility-Administered Medications:     sodium chloride (NS) flush 5-10 mL, 5-10 mL, IntraVENous, PRN, Chani Looney MD    sodium chloride (NS) flush 5-40 mL, 5-40 mL, IntraVENous, Q8H, Cindy Shearer MD    sodium chloride (NS) flush 5-40 mL, 5-40 mL, IntraVENous, PRN, Cindy Shearer MD    acetaminophen (TYLENOL) tablet 650 mg, 650 mg, Oral, Q6H PRN, 650 mg at 01/21/23 1029 **OR** acetaminophen (TYLENOL) suppository 650 mg, 650 mg, Rectal, Q6H PRN, Maida Shearer MD    polyethylene glycol (MIRALAX) packet 17 g, 17 g, Oral, DAILY, Maida Shearer MD, 17 g at 01/21/23 1029    promethazine (PHENERGAN) tablet 12.5 mg, 12.5 mg, Oral, Q6H PRN **OR** ondansetron (ZOFRAN) injection 4 mg, 4 mg, IntraVENous, Q6H PRN, Maida Shearer MD    bisacodyL (DULCOLAX) suppository 10 mg, 10 mg, Rectal, DAILY PRN, Maida De La Fuente MD    enoxaparin (LOVENOX) injection 40 mg, 40 mg, SubCUTAneous, DAILY, Maida Shearer MD, 40 mg at 01/21/23 1024    melatonin tablet 3 mg, 3 mg, Oral, QHS PRN, Maida De La Fuente MD    hydrALAZINE (APRESOLINE) 20 mg/mL injection 20 mg, 20 mg, IntraVENous, Q6H PRN, Maida Shearer MD    cefTRIAXone (ROCEPHIN) 1 g in 0.9% sodium chloride (MBP/ADV) 50 mL MBP, 1 g, IntraVENous, Q24H, Maida Shearer MD    aspirin chewable tablet 81 mg, 81 mg, Oral, DAILY, Maida Shearer MD, 81 mg at 01/21/23 1024    ezetimibe (ZETIA) tablet 10 mg, 10 mg, Oral, DAILY, Maida Shearer MD, 10 mg at 01/21/23 1024    fenofibrate (LOFIBRA) tablet 54 mg, 54 mg, Oral, DAILY, Maida Shearer MD, 54 mg at 01/21/23 1029    mirtazapine (REMERON) tablet 7.5 mg, 7.5 mg, Oral, QHS, Maida Shearer MD, 7.5 mg at 01/20/23 2220    pantoprazole (PROTONIX) tablet 40 mg, 40 mg, Oral, ACB, Maida Shearer MD, 40 mg at 01/21/23 1024    sertraline (ZOLOFT) tablet 50 mg, 50 mg, Oral, DAILY, Maida Shearer MD, 50 mg at 01/21/23 1024    morphine injection 2 mg, 2 mg, IntraVENous, Q3H PRN, Maida De La Fuente MD     Procedures: see electronic medical records for all procedures/Xrays and details which were not copied into this note but were reviewed prior to creation of Plan.     Reviewed most current lab test results and cultures  YES  Reviewed most current radiology test results   YES  Review and summation of old records today    NO  Reviewed patient's current orders and MAR    YES  PMH/SH reviewed - no change compared to H&P  ________________________________________________________________________  Care Plan discussed with:    Comments   Patient x    Family      RN x    Care Manager     Consultant                        Multidiciplinary team rounds were held today with , nursing, pharmacist and clinical coordinator. Patient's plan of care was discussed; medications were reviewed and discharge planning was addressed.      ________________________________________________________________________  Total NON critical care TIME:   36  Minutes    Total CRITICAL CARE TIME Spent:   Minutes non procedure based      Comments   >50% of visit spent in counseling and coordination of care x     This includes time during multidisciplinary rounds if indicated above   ________________________________________________________________________  Kera Messina MD

## 2023-01-21 NOTE — CONSULTS
Vascular Surgery Consult Note  1/21/2023    Subjective:     Remedios Castillo is a 78 y.o.  female that is predominantly wheelchair bound that presents with right calf and foot pain for 1 day. Has improved. Has not had similar symptoms before. Past Medical History:   Diagnosis Date    Abnormal cardiovascular stress test 04/18/08    Dr. Josr Petersen    Allergy, unspecified not elsewhere classified     Bell's palsy 02/2005    Left. Dr. Joie Monroy    CAD (coronary artery disease)     Dr. Worthington Said     Dr. Leila Houser. Dr. Krishna Carey    Chest pain 2002    Dr. Ros Tian    Chronic headache 2013    due to depression. Dr. Mishra No. Chronic pain     LEGS AND FEET; BACK UNDER RIGHT SHOULDER    Congenital eye disorder     Lazy eyes. Dr. Randy Mcneil    Dementia [facility])     Dr. Akira Chan. Depression 2011    Dr. Levar Montague (psychologist). Dr. Mishra No. Dr. Yakov Jasmine. DJD (degenerative joint disease)     knees, low back. Dr. Janet Weiner. Dr. Cornel Pritchard. Dr. Randy Hines    Gait instability 02/10/09    Dr. Lenny Syed. Dr. Mishra No. Gallstones     GERD (gastroesophageal reflux disease)     Hiatal hernia 1977    Hypercholesteremia     Hypertension     Ill-defined condition     \"JUST ONE NIGHT I WENT TO STAND UP AND I JUST COULDN'T STAND\"  PT NOT SURE WHY    Leg weakness 12/2009    Dr. Akira Chan. Dr. Jose Conley. Lower extremity venous stasis     Dr. Vivian Ramesh    Lumbar spinal stenosis 12/2009    L3-5. Dr. Mague Roberson. PARAPLEGIA SINCE 2012    Lymphedema 12/19/08    Dr. Tu Oconnell then Dr. Erasmo Rodriguez    Memory loss 2013    Dr. Sony Aldana. Menopause     Migraine     Ocular. Dr. Marcos Rao    NORMA (obstructive sleep apnea) 01/24/07    Dr. Marianna Randall.  NO CPAP    Osteoporosis 02/10/09    Dr. Yelena Hebert    Pancreatitis 03/2011    Post-menopausal bleeding 07/2016    due to Dr. Leita Gaucher, Lupe Lin cuff injury     Left. Dr. Nahid Hernandez; chronic bilateral    Seborrheic keratosis     Dr. Yuly Emerson 09    Dr. Concetta Santillan    Tremor     Dr. Ashwini Cortez. Unspecified vitamin D deficiency 2009    Urinary incontinence     Dr. Bernice Fonseca    Venous insufficiency 1998    R>L. with chronic leg edema. Dr. Naomy Recinos      Past Surgical History:   Procedure Laterality Date    HX HEART CATHETERIZATION  2008    Dr. Alfonse Phoenix. due to abnormal Stress Test    HX MAR AND BSO  2016    Dr. Mitesh Ivan, Jr     HYSTEROSCOPIC MYOMECTOMY  2016    D AND C WITH SYMPHION, RESECTION OF ENDOMETRIAL TISSUE. benign. due to postmenopausal bleeding. Dr. Zhou Chilel. Family History   Problem Relation Age of Onset    Hypertension Mother     OSTEOARTHRITIS Mother     Dementia Mother     Cancer Father         liver    Heart Attack Father     Other Father         kidney stones    Stroke Maternal Grandmother     Hypertension Sister     OSTEOARTHRITIS Sister     Diabetes Paternal Aunt     Anesth Problems Neg Hx       Social History     Tobacco Use    Smoking status: Former     Packs/day: 0.25     Years: 0.50     Pack years: 0.13     Types: Cigarettes     Quit date: 1962     Years since quittin.0    Smokeless tobacco: Never    Tobacco comments:     Pt smoked 3-4 cigs x 9 mos. Pt lived with smoker dad x 40 yrs   Substance Use Topics    Alcohol use: No       Prior to Admission medications    Medication Sig Start Date End Date Taking? Authorizing Provider   acetaminophen (TYLENOL) 500 mg tablet Take 1,000 mg by mouth three (3) times daily. Yes Provider, Historical   fenofibric acid (TRILIPIX ER) 45 mg capsule Take 45 mg by mouth daily. Yes Provider, Historical   acetaminophen (TYLENOL) 325 mg tablet Take 650 mg by mouth every six (6) hours as needed for Pain. Yes Provider, Historical   emollient combination no. 114 (Eucerin Advanced Repair) crea Apply 1 Each to affected area as needed (skin dryness). Yes Provider, Historical   Cetaphil topical cream Apply 1 Each to affected area as needed for Dry Skin. Yes Provider, Historical   diclofenac (VOLTAREN) 1 % gel Apply 2 g to affected area three (3) times daily as needed for Pain (to both knees). Yes Provider, Historical   ezetimibe (ZETIA) 10 mg tablet Take 10 mg by mouth. 1/21/19  Yes Provider, Historical   ondansetron (ZOFRAN ODT) 8 mg disintegrating tablet Take 8 mg by mouth every eight (8) hours as needed. 6/7/18  Yes Provider, Historical   cholecalciferol (VITAMIN D3) 25 mcg (1,000 unit) cap Take 1,000 Units by mouth daily. Yes Provider, Historical   codeine-butalbital-aspirin-caffeine (FIORINAL WITH CODEINE) 18--40 mg per capsule Take  by mouth every six (6) hours as needed for Pain. Yes Provider, Historical   sertraline (ZOLOFT) 50 mg tablet Take 50 mg by mouth daily. 4/20/18  Yes Provider, Historical   potassium chloride SR (KLOR-CON 10) 10 mEq tablet Take  by mouth daily. 4/20/18  Yes Provider, Historical   pantoprazole (PROTONIX) 40 mg tablet Take 40 mg by mouth two (2) times a day. 4/20/18  Yes Provider, Historical   mirtazapine (REMERON SOL-TAB) 15 mg disintegrating tablet Take 7.5 mg by mouth. 4/20/18  Yes Provider, Historical   aspirin 81 mg chewable tablet Take 1 Tab by mouth daily. Indications: prevention of cerebrovascular accident 8/3/17  Yes Naomi Owens, DO   amlodipine (NORVASC) 2.5 mg tablet Take 1 tablet by mouth daily. 10/29/14  Yes Naomi Owens, DO   bumetanide (BUMEX) 1 mg tablet TAKE 1 TABLET BY MOUTH EVERY DAY 10/29/14  Yes Naomi Owens, DO   lisinopril (PRINIVIL, ZESTRIL) 5 mg tablet Take 1 tablet by mouth daily.  10/29/14  Yes Naomi Owens, DO     Allergies   Allergen Reactions    Aristocort [Triamcinolone] Hives     Aristocort shot    Codeine Itching    Lasix [Furosemide] Itching    Bupropion Other (comments)     Severe headache    Darvocet A500 [Propoxyphene N-Acetaminophen] Other (comments)     headache    Influenza Virus Vaccine Whole Other (comments)     pharyngitis    Mevacor [Lovastatin] Other (comments)     myalgias    Percocet [Oxycodone-Acetaminophen] Itching     Worse headache    Rhinocort Other (comments)     nosebleed    Simvastatin Other (comments)     myalgias        Review of Systems   Constitutional:  Negative for fever. Respiratory:  Negative for shortness of breath and wheezing. Cardiovascular:  Negative for chest pain and leg swelling. Gastrointestinal:  Negative for abdominal distention. Endocrine: Negative for cold intolerance. Musculoskeletal:  Negative for back pain. Skin:  Negative for pallor and wound. Neurological:  Negative for dizziness and light-headedness. Objective:     Patient Vitals for the past 24 hrs:   BP Temp Pulse Resp SpO2 Height Weight   01/21/23 0048 110/68 98.3 °F (36.8 °C) 82 16 98 % -- --   01/20/23 2153 102/64 98.4 °F (36.9 °C) 88 17 98 % -- --   01/20/23 2048 -- -- 99 -- 96 % -- --   01/20/23 2045 116/64 -- 91 -- -- -- --   01/20/23 1946 -- 98.7 °F (37.1 °C) 78 16 98 % -- --   01/20/23 1945 101/78 -- 84 -- -- -- --   01/20/23 1915 105/81 -- 72 -- 99 % -- --   01/20/23 1909 119/70 -- 70 -- 95 % -- --   01/20/23 1815 101/74 -- 86 -- 99 % -- --   01/20/23 1715 129/69 -- 88 11 98 % -- --   01/20/23 1620 125/74 -- 70 15 -- -- --   01/20/23 1531 (!) 108/56 -- 72 16 99 % -- --   01/20/23 1415 91/66 -- 72 16 98 % -- --   01/20/23 1305 (!) 93/58 99.3 °F (37.4 °C) 77 18 95 % 5' (1.524 m) 131.5 kg (290 lb)        Physical Exam  Constitutional:       Appearance: Normal appearance. HENT:      Head: Normocephalic and atraumatic. Eyes:      Extraocular Movements: Extraocular movements intact. Pupils: Pupils are equal, round, and reactive to light. Pulmonary:      Effort: Pulmonary effort is normal.   Abdominal:      Palpations: Abdomen is soft.    Musculoskeletal:         General: Normal range of motion. Cervical back: Normal range of motion. Skin:     General: Skin is warm. Capillary Refill: Capillary refill takes less than 2 seconds. Neurological:      General: No focal deficit present. Mental Status: She is alert. Psychiatric:         Mood and Affect: Mood normal.       Pertinent Test Results:   Recent Results (from the past 24 hour(s))   CBC WITH AUTOMATED DIFF    Collection Time: 01/20/23  2:01 PM   Result Value Ref Range    WBC 6.6 3.6 - 11.0 K/uL    RBC 3.77 (L) 3.80 - 5.20 M/uL    HGB 11.4 (L) 11.5 - 16.0 g/dL    HCT 36.5 35.0 - 47.0 %    MCV 96.8 80.0 - 99.0 FL    MCH 30.2 26.0 - 34.0 PG    MCHC 31.2 30.0 - 36.5 g/dL    RDW 14.8 (H) 11.5 - 14.5 %    PLATELET 382 971 - 691 K/uL    MPV 10.4 8.9 - 12.9 FL    NRBC 0.0 0  WBC    ABSOLUTE NRBC 0.00 0.00 - 0.01 K/uL    NEUTROPHILS 51 32 - 75 %    LYMPHOCYTES 37 12 - 49 %    MONOCYTES 7 5 - 13 %    EOSINOPHILS 4 0 - 7 %    BASOPHILS 1 0 - 1 %    IMMATURE GRANULOCYTES 0 0.0 - 0.5 %    ABS. NEUTROPHILS 3.4 1.8 - 8.0 K/UL    ABS. LYMPHOCYTES 2.4 0.8 - 3.5 K/UL    ABS. MONOCYTES 0.5 0.0 - 1.0 K/UL    ABS. EOSINOPHILS 0.3 0.0 - 0.4 K/UL    ABS. BASOPHILS 0.0 0.0 - 0.1 K/UL    ABS. IMM. GRANS. 0.0 0.00 - 0.04 K/UL    DF AUTOMATED     METABOLIC PANEL, COMPREHENSIVE    Collection Time: 01/20/23  2:01 PM   Result Value Ref Range    Sodium 141 136 - 145 mmol/L    Potassium 3.7 3.5 - 5.1 mmol/L    Chloride 105 97 - 108 mmol/L    CO2 26 21 - 32 mmol/L    Anion gap 10 5 - 15 mmol/L    Glucose 95 65 - 100 mg/dL    BUN 31 (H) 6 - 20 MG/DL    Creatinine 1.36 (H) 0.55 - 1.02 MG/DL    BUN/Creatinine ratio 23 (H) 12 - 20      eGFR 38 (L) >60 ml/min/1.73m2    Calcium 9.4 8.5 - 10.1 MG/DL    Bilirubin, total 0.7 0.2 - 1.0 MG/DL    ALT (SGPT) 10 (L) 12 - 78 U/L    AST (SGOT) 9 (L) 15 - 37 U/L    Alk.  phosphatase 43 (L) 45 - 117 U/L    Protein, total 6.5 6.4 - 8.2 g/dL    Albumin 3.6 3.5 - 5.0 g/dL    Globulin 2.9 2.0 - 4.0 g/dL    A-G Ratio 1.2 1.1 - 2.2     TROPONIN-HIGH SENSITIVITY    Collection Time: 01/20/23  2:01 PM   Result Value Ref Range    Troponin-High Sensitivity 12 0 - 51 ng/L   COVID-19 RAPID TEST    Collection Time: 01/20/23  2:01 PM   Result Value Ref Range    Specimen source Nasopharyngeal      COVID-19 rapid test Not detected NOTD     INFLUENZA A+B VIRAL AGS    Collection Time: 01/20/23  2:01 PM   Result Value Ref Range    Influenza A Antigen Negative NEG      Influenza B Antigen Negative NEG     SED RATE (ESR)    Collection Time: 01/20/23  2:01 PM   Result Value Ref Range    Sed rate, automated 9 0 - 30 mm/hr   C REACTIVE PROTEIN, QT    Collection Time: 01/20/23  2:01 PM   Result Value Ref Range    C-Reactive protein <0.29 0.00 - 0.60 mg/dL   BLOOD GAS,CHEM8,LACTIC ACID POC    Collection Time: 01/20/23  2:09 PM   Result Value Ref Range    Calcium, ionized (POC) 1.24 1.12 - 1.32 mmol/L    BICARBONATE 25 mmol/L    Base deficit (POC) 0.2 mmol/L    Sample source VENOUS BLOOD      CO2, POC 25 (H) 19 - 24 MMOL/L    Sodium,  136 - 145 MMOL/L    Potassium, POC 3.7 3.5 - 5.5 MMOL/L    Chloride,  100 - 108 MMOL/L    Glucose, POC 87 74 - 106 MG/DL    Creatinine, POC 1.4 (H) 0.6 - 1.3 MG/DL    Lactic Acid (POC) 1.02 0.40 - 2.00 mmol/L    pH, venous (POC) 7.37 7.32 - 7.42      pCO2, venous (POC) 44.0 41 - 51 MMHG    pO2, venous (POC) 35 25 - 40 mmHg   EKG, 12 LEAD, INITIAL    Collection Time: 01/20/23  2:56 PM   Result Value Ref Range    Ventricular Rate 66 BPM    Atrial Rate 66 BPM    P-R Interval 168 ms    QRS Duration 108 ms    Q-T Interval 436 ms    QTC Calculation (Bezet) 457 ms    Calculated P Axis 19 degrees    Calculated R Axis -14 degrees    Calculated T Axis 0 degrees    Diagnosis       Normal sinus rhythm  Minimal voltage criteria for LVH, may be normal variant  Nonspecific ST and T wave abnormality  When compared with ECG of 28-JAN-2018 00:37,  Nonspecific T wave abnormality no longer evident in Lateral leads     URINALYSIS W/ REFLEX CULTURE    Collection Time: 01/20/23  3:44 PM    Specimen: Urine   Result Value Ref Range    Color YELLOW/STRAW      Appearance CLOUDY (A) CLEAR      Specific gravity 1.014      pH (UA) 5.5 5.0 - 8.0      Protein Negative NEG mg/dL    Glucose Negative NEG mg/dL    Ketone Negative NEG mg/dL    Bilirubin Negative NEG      Blood Negative NEG      Urobilinogen 1.0 0.2 - 1.0 EU/dL    Nitrites Negative NEG      Leukocyte Esterase LARGE (A) NEG      WBC >100 (H) 0 - 4 /hpf    RBC 0-5 0 - 5 /hpf    Epithelial cells FEW FEW /lpf    Bacteria 2+ (A) NEG /hpf    UA:UC IF INDICATED URINE CULTURE ORDERED (A) CNI     PROCALCITONIN    Collection Time: 01/20/23  4:01 PM   Result Value Ref Range    Procalcitonin 0.05 ng/mL   CK    Collection Time: 01/20/23  4:01 PM   Result Value Ref Range    CK 41 26 - 398 U/L   METABOLIC PANEL, COMPREHENSIVE    Collection Time: 01/21/23 12:11 AM   Result Value Ref Range    Sodium 142 136 - 145 mmol/L    Potassium 3.4 (L) 3.5 - 5.1 mmol/L    Chloride 111 (H) 97 - 108 mmol/L    CO2 24 21 - 32 mmol/L    Anion gap 7 5 - 15 mmol/L    Glucose 97 65 - 100 mg/dL    BUN 22 (H) 6 - 20 MG/DL    Creatinine 1.10 (H) 0.55 - 1.02 MG/DL    BUN/Creatinine ratio 20 12 - 20      eGFR 50 (L) >60 ml/min/1.73m2    Calcium 8.3 (L) 8.5 - 10.1 MG/DL    Bilirubin, total 0.3 0.2 - 1.0 MG/DL    ALT (SGPT) 9 (L) 12 - 78 U/L    AST (SGOT) 10 (L) 15 - 37 U/L    Alk.  phosphatase 36 (L) 45 - 117 U/L    Protein, total 5.6 (L) 6.4 - 8.2 g/dL    Albumin 3.0 (L) 3.5 - 5.0 g/dL    Globulin 2.6 2.0 - 4.0 g/dL    A-G Ratio 1.2 1.1 - 2.2     CBC WITH AUTOMATED DIFF    Collection Time: 01/21/23 12:11 AM   Result Value Ref Range    WBC 6.5 3.6 - 11.0 K/uL    RBC 3.42 (L) 3.80 - 5.20 M/uL    HGB 10.2 (L) 11.5 - 16.0 g/dL    HCT 33.4 (L) 35.0 - 47.0 %    MCV 97.7 80.0 - 99.0 FL    MCH 29.8 26.0 - 34.0 PG    MCHC 30.5 30.0 - 36.5 g/dL    RDW 15.1 (H) 11.5 - 14.5 %    PLATELET 205 304 - 524 K/uL    MPV 10.5 8.9 - 12.9 FL    NRBC 0.0 0  WBC    ABSOLUTE NRBC 0.00 0.00 - 0.01 K/uL    NEUTROPHILS 49 32 - 75 %    LYMPHOCYTES 39 12 - 49 %    MONOCYTES 7 5 - 13 %    EOSINOPHILS 4 0 - 7 %    BASOPHILS 1 0 - 1 %    IMMATURE GRANULOCYTES 0 0.0 - 0.5 %    ABS. NEUTROPHILS 3.2 1.8 - 8.0 K/UL    ABS. LYMPHOCYTES 2.6 0.8 - 3.5 K/UL    ABS. MONOCYTES 0.5 0.0 - 1.0 K/UL    ABS. EOSINOPHILS 0.3 0.0 - 0.4 K/UL    ABS. BASOPHILS 0.0 0.0 - 0.1 K/UL    ABS. IMM. GRANS. 0.0 0.00 - 0.04 K/UL    DF AUTOMATED         Assessmen/Plan:   Right foot/calf pain that has improved, unclear whether this is rest pain. Pain is improved. Neruovascular intact and foot warm. Limb not threatened  CTA shows chronic multilevel PAD  No indication for acute intervention, will restart diet  Venous duplex pending  Patient would like to go home. Okay for discharge from vascular standpoint and will arrange close outpatient follow up.    Signed By: Moreno Gray MD     January 21, 2023

## 2023-01-21 NOTE — H&P
Hospitalist Admission Note    NAME:  Niraj Cordova   :   1940   MRN:   [id number]     Date of admit: 2023    PCP: Don Betancur DO    Assessment/Plan:     Right foot and calf pain POA  Diminished right dorsalis pedis, posterior tibial pulses POA  Probable PAD POA  Came to the emergency room because her right foot and ankle has been hurting overnight  No known PAD  No trauma to the leg  No clear skin changes, right middle toe bandaged  Could not clearly palpate dorsalis pedis or posterior tibial pulses bilaterally   2+ femoral pulses bilaterally  Doppler ultrasound not able to detect right dorsalis pedis, posterior tibials pulses  Able detect a left dorsalis pedis, left posterior tibial very faint  Able to flex toes and feet vs resistance in both feet  No numbness in feet  Mild pain on motion of ankle and knee  Differential   Ischemic foot pain   Fracture less likely   ? DVT   No clear evidence of cellulitis   ? Radicular pain, denies back pain   ? OA  Admit  IVF  Vascular surgery consult, I spoke with Dr Yobany Bell, he will see in Am  Check LE dopplers to rule out DVT  Check right ankle and tib-fib x-rays  Check abdominal aortic CTA with LE runoff  Serial labs  Called brother, no answer    Possible UTI POA  Abnormal UA > 100 WBC, 0-5 RBCs, 2 + bacteria  No SIRS criteria  No urinary symptoms  Check blood and urine cultures    ? Mild acute kidney injury POA BUN/creat 31 and 1.36  Last baseline creat 0.8 to 0.96 in 2018  No more recent labs  S/p several liter IVF bolus in ED  Recheck creatinine in AM    Essential HTN POA  Hyperlipidemia POA  CAD POA  Relative hypotension 93/58 in ED POA  Hold norvasc and lisinopril  S/p IVF  Continue zetia, fenofibrate, ASA  PRN hydralazine    Mild cognitive impairment POA  Current alert, appropriate  Watch for delirium  Continue remeron    GERD  Continue PPI    Morbid Obesity POA Body mass index is 56.64 kg/m².     Given the patient's current clinical presentation, I have a high level of concern for decompensation if discharged from the emergency department. My assessment of this patient's clinical condition and my plan of care is as noted above.     DVT prophylaxis with lovenox    Code status: full code  NOK: Brother Anabela Leggett    History     CHIEF COMPLAINT: \" My right calf and foot have been aching overnight\"    HISTORY OF PRESENT ILLNESS:    78year-old female    Reported history of cognitive impairment  Essential hypertension, hyperlipidemia, coronary artery disease    Denies a prior history of PAD    Resident of assisted living  Baseline is wheelchair-bound, but uses her legs to propel her self around    Some discomfort in her right foot and calf and lower thigh yesterday evening  Increasing aching pain this a.m. moderate  Pain just came \"on its own\"  Pain mamta severe in her right ankle and right foot  No weakness or numbness in the right foot  No trauma to the leg  No fevers, headache, sore throat, shortness of breath, chest pain  Chronic cough \"from allergies\"  No nausea vomiting, diarrhea, abdominal pain  No dysuria  Came to the emergency room because of the right foot and leg pain    Emergency room  Borderline low blood pressure 93/58  Received a 30 mL/kg bolus = 3.945 liters  Abnormal UA > 100 WBC, 0-5 RBCs, 2 + bacteria  No SIRS criteria  BUN/creat 31 and 1.36  Last baseline creat 0.8 to 0.96 in 2018  Chest x-ray with no airspace disease  Right hip pelvis x-ray with no fractures, + DJD   Could not clearly palpate dorsalis pedis or posterior tibial pulses bilaterally   2+ femoral pulses bilaterally  Doppler ultrasound not able to detect right dorsalis pedis, posterior tibials pulses  Able detect a left dorsalis pedis, left posterior tibial very faint  Able to flex toes and feet vs resistance in both feet  No numbness in feet  IV ceftriaxone  Blood and urine cultures were sent    Past Medical History:   Diagnosis Date    Abnormal cardiovascular stress test 04/18/08    Dr. Mireya Wilcox    Allergy, unspecified not elsewhere classified     Bell's palsy 02/2005    Left. Dr. Irvin Raza    CAD (coronary artery disease)     Dr. Justin Zaldivar. Dr. Nic Mello    Chest pain 2002    Dr. Cheri Ramirez    Chronic headache 2013    due to depression. Dr. Justin Moser. Chronic pain     LEGS AND FEET; BACK UNDER RIGHT SHOULDER    Congenital eye disorder     Lazy eyes. Dr. Warren Munoz    Dementia [facility])     Dr. Lali Londono. Depression 2011    Dr. Lazaro Jasso (psychologist). Dr. Justin Moser. Dr. Yudith Rodriguez. DJD (degenerative joint disease)     knees, low back. Dr. Baljeet Walker. Dr. Scarlet Toledo Nurse. Dr. Priscilla Cullen    Gait instability 02/10/09    Dr. Debbie Denton. Dr. Justin Moser. Gallstones     GERD (gastroesophageal reflux disease)     Hiatal hernia 1977    Hypercholesteremia     Hypertension     Ill-defined condition     \"JUST ONE NIGHT I WENT TO STAND UP AND I JUST COULDN'T STAND\"  PT NOT SURE WHY    Leg weakness 12/2009    Dr. Lali Londono. Dr. Jerrica Bonilla. Lower extremity venous stasis     Dr. Demian Hernandez    Lumbar spinal stenosis 12/2009    L3-5. Dr. Makenzie Obando. PARAPLEGIA SINCE 2012    Lymphedema 12/19/08    Dr. Bárbara Peguero then Dr. Jyoti Ag    Memory loss 2013    Dr. Aston Barrow. Menopause     Migraine     Ocular. Dr. Ivy Villa    NORMA (obstructive sleep apnea) 01/24/07    Dr. Sophy Germain. NO CPAP    Osteoporosis 02/10/09    Dr. Helen Nieto    Pancreatitis 03/2011    Post-menopausal bleeding 07/2016    due to Dr. India Grant Jr.    Rotator cuff injury     Left. Dr. Hillary Vu; chronic bilateral    Seborrheic keratosis 2005    Dr. Meenakshi Boggs 02/05/09    Dr. Leslie Gloria    Tremor 2013    Dr. Justin Moser. Unspecified vitamin D deficiency 07/2009    Urinary incontinence 2013    Dr. Jesus Manuel Knutson    Venous insufficiency 1998    R>L.   with chronic leg edema. Dr. Renetta Obrien        Past Surgical History:   Procedure Laterality Date    HX HEART CATHETERIZATION  2008    Dr. Parekh Erlin. due to abnormal Stress Test    HX MAR AND BSO  2016    Dr. Ty Ibrahim, Jr     HYSTEROSCOPIC MYOMECTOMY  2016    D AND C WITH SYMPHION, RESECTION OF ENDOMETRIAL TISSUE. benign. due to postmenopausal bleeding. Dr. Cordova Parents. Social History     Tobacco Use    Smoking status: Former     Packs/day: 0.25     Years: 0.50     Pack years: 0.13     Types: Cigarettes     Quit date: 1962     Years since quittin.0    Smokeless tobacco: Never    Tobacco comments:     Pt smoked 3-4 cigs x 9 mos. Pt lived with smoker dad x 40 yrs   Substance Use Topics    Alcohol use: No        Family History   Problem Relation Age of Onset    Hypertension Mother     OSTEOARTHRITIS Mother     Dementia Mother     Cancer Father         liver    Heart Attack Father     Other Father         kidney stones    Stroke Maternal Grandmother     Hypertension Sister     OSTEOARTHRITIS Sister     Diabetes Paternal Aunt     Anesth Problems Neg Hx     No children of her own    Allergies   Allergen Reactions    Aristocort [Triamcinolone] Hives     Aristocort shot    Codeine Itching    Lasix [Furosemide] Itching    Bupropion Other (comments)     Severe headache    Darvocet A500 [Propoxyphene N-Acetaminophen] Other (comments)     headache    Influenza Virus Vaccine Whole Other (comments)     pharyngitis    Mevacor [Lovastatin] Other (comments)     myalgias    Percocet [Oxycodone-Acetaminophen] Itching     Worse headache    Rhinocort Other (comments)     nosebleed    Simvastatin Other (comments)     myalgias        Prior to Admission medications    Medication Sig Start Date End Date Taking? Authorizing Provider   acetaminophen (TYLENOL) 500 mg tablet Take 1,000 mg by mouth three (3) times daily.    Yes Provider, Historical   fenofibric acid (TRILIPIX ER) 45 mg capsule Take 45 mg by mouth daily. Yes Provider, Historical   acetaminophen (TYLENOL) 325 mg tablet Take 650 mg by mouth every six (6) hours as needed for Pain. Yes Provider, Historical   emollient combination no. 114 (Eucerin Advanced Repair) crea Apply 1 Each to affected area as needed (skin dryness). Yes Provider, Historical   Cetaphil topical cream Apply 1 Each to affected area as needed for Dry Skin. Yes Provider, Historical   diclofenac (VOLTAREN) 1 % gel Apply 2 g to affected area three (3) times daily as needed for Pain (to both knees). Yes Provider, Historical   ezetimibe (ZETIA) 10 mg tablet Take 10 mg by mouth. 1/21/19  Yes Provider, Historical   ondansetron (ZOFRAN ODT) 8 mg disintegrating tablet Take 8 mg by mouth every eight (8) hours as needed. 6/7/18  Yes Provider, Historical   cholecalciferol (VITAMIN D3) 25 mcg (1,000 unit) cap Take 1,000 Units by mouth daily. Yes Provider, Historical   codeine-butalbital-aspirin-caffeine (FIORINAL WITH CODEINE) 05--40 mg per capsule Take  by mouth every six (6) hours as needed for Pain. Yes Provider, Historical   sertraline (ZOLOFT) 50 mg tablet Take 50 mg by mouth daily. 4/20/18  Yes Provider, Historical   potassium chloride SR (KLOR-CON 10) 10 mEq tablet Take  by mouth daily. 4/20/18  Yes Provider, Historical   pantoprazole (PROTONIX) 40 mg tablet Take 40 mg by mouth two (2) times a day. 4/20/18  Yes Provider, Historical   mirtazapine (REMERON SOL-TAB) 15 mg disintegrating tablet Take 7.5 mg by mouth. 4/20/18  Yes Provider, Historical   aspirin 81 mg chewable tablet Take 1 Tab by mouth daily. Indications: prevention of cerebrovascular accident 8/3/17  Yes Naomi Chery, DO   amlodipine (NORVASC) 2.5 mg tablet Take 1 tablet by mouth daily.  10/29/14  Yes Naomi Chery, DO   bumetanide (BUMEX) 1 mg tablet TAKE 1 TABLET BY MOUTH EVERY DAY 10/29/14  Yes Naomi Chery, DO   lisinopril (PRINIVIL, ZESTRIL) 5 mg tablet Take 1 tablet by mouth daily.  10/29/14  Yes Keesha Flores R, DO       Review of symptoms:     POSITIVE= Bold  Negative = not bold  General:  fever, chills, sweats,  Eyes:    blurred vision, eye pain, double vision  ENT:    Coryza, sore throat, trouble swallowing  Respiratory:   Chronic cough, sputum, SOB  Cardiology:   chest pain, orthopnea, PND, edema  Gastrointestinal:  abdominal pain , N/V, diarrhea, constipation, melena or BRBPR  Genitourinary:  Urgency, dysuria, hematuria  Muskuloskeletal :  Joint redness, swelling or acute joint pain, myalgias     Right foot, ankle, calf pain  Hematology:  easy bruising, nose or gum bleeding  Dermatological: rash  Endocrine:   Polyuria or polydipsia, heat or hold intolerance  Neurological:  Headache, focal motor or sensory changes     Speech difficulties, memory loss  Psychological: depression, agitation      Objective:   VITALS:    Patient Vitals for the past 24 hrs:   Temp Pulse Resp BP SpO2   23 1915 -- 72 -- 105/81 99 %   23 1815 -- 86 -- 101/74 99 %   23 1715 -- 88 11 129/69 98 %   23 1620 -- 70 15 125/74 --   23 1531 -- 72 16 (!) 108/56 99 %   23 1415 -- 72 16 91/66 98 %   23 1305 99.3 °F (37.4 °C) 77 18 (!) 93/58 95 %     Temp (24hrs), Av.3 °F (37.4 °C), Min:99.3 °F (37.4 °C), Max:99.3 °F (37.4 °C)      O2 Device: None (Room air)    Wt Readings from Last 12 Encounters:   23 131.5 kg (290 lb)   20 131.5 kg (290 lb)   18 131.5 kg (290 lb)   18 131.5 kg (290 lb)   18 131.5 kg (290 lb)   18 119.7 kg (264 lb)   17 120.1 kg (264 lb 12.4 oz)   17 123.8 kg (273 lb)   17 123.8 kg (273 lb)   17 123.8 kg (273 lb)   17 123.8 kg (273 lb)   16 123.8 kg (273 lb)         PHYSICAL EXAM:     I had a face to face encounter and independently examined this patient on 23  as outlined below:    General:    Alert, cooperative in no distress     HEENT: Normocephalic, atraumatic    PERRL, Sclera no icterus    Nasal mucosa without masses or discharge      Oropharynx without erythema or exudate   Neck:  No meningismus, trachea midline, no carotid bruits     Thyroid not enlarged, no nodules or tenderness  Lungs:   Clear to auscultation bilaterally. No wheezing or rales    No accessory muscle use or retractions. Heart:   Regular rate and rhythm,  no murmur or gallop. No LE edema    Feet cool but not ice cold    2+ femoral pulses    Not able to palpate DP or PT bilaterally  Abdomen:   Soft, non-tender. Not distended. Bowel sounds normal.     No masses, No Hepatosplenomegaly, No Rebound or guarding  Lymph nodes: No cervical or inguinal JERROD  Musculoskeletal:  No Joint swelling, erythema, warmth. No Cyanosis or clubbing  Skin:      No rashes     Not Jaundiced   No nodules or thickening  Neurologic: Alert and oriented X 3, follows commands     Cranial nerves 2 to 12 intact    Symmetric motor strength bilaterally       LAB DATA REVIEWED:    Recent Results (from the past 12 hour(s))   CBC WITH AUTOMATED DIFF    Collection Time: 01/20/23  2:01 PM   Result Value Ref Range    WBC 6.6 3.6 - 11.0 K/uL    RBC 3.77 (L) 3.80 - 5.20 M/uL    HGB 11.4 (L) 11.5 - 16.0 g/dL    HCT 36.5 35.0 - 47.0 %    MCV 96.8 80.0 - 99.0 FL    MCH 30.2 26.0 - 34.0 PG    MCHC 31.2 30.0 - 36.5 g/dL    RDW 14.8 (H) 11.5 - 14.5 %    PLATELET 198 594 - 009 K/uL    MPV 10.4 8.9 - 12.9 FL    NRBC 0.0 0  WBC    ABSOLUTE NRBC 0.00 0.00 - 0.01 K/uL    NEUTROPHILS 51 32 - 75 %    LYMPHOCYTES 37 12 - 49 %    MONOCYTES 7 5 - 13 %    EOSINOPHILS 4 0 - 7 %    BASOPHILS 1 0 - 1 %    IMMATURE GRANULOCYTES 0 0.0 - 0.5 %    ABS. NEUTROPHILS 3.4 1.8 - 8.0 K/UL    ABS. LYMPHOCYTES 2.4 0.8 - 3.5 K/UL    ABS. MONOCYTES 0.5 0.0 - 1.0 K/UL    ABS. EOSINOPHILS 0.3 0.0 - 0.4 K/UL    ABS. BASOPHILS 0.0 0.0 - 0.1 K/UL    ABS. IMM.  GRANS. 0.0 0.00 - 0.04 K/UL    DF AUTOMATED     METABOLIC PANEL, COMPREHENSIVE    Collection Time: 01/20/23  2:01 PM   Result Value Ref Range    Sodium 141 136 - 145 mmol/L    Potassium 3.7 3.5 - 5.1 mmol/L    Chloride 105 97 - 108 mmol/L    CO2 26 21 - 32 mmol/L    Anion gap 10 5 - 15 mmol/L    Glucose 95 65 - 100 mg/dL    BUN 31 (H) 6 - 20 MG/DL    Creatinine 1.36 (H) 0.55 - 1.02 MG/DL    BUN/Creatinine ratio 23 (H) 12 - 20      eGFR 38 (L) >60 ml/min/1.73m2    Calcium 9.4 8.5 - 10.1 MG/DL    Bilirubin, total 0.7 0.2 - 1.0 MG/DL    ALT (SGPT) 10 (L) 12 - 78 U/L    AST (SGOT) 9 (L) 15 - 37 U/L    Alk.  phosphatase 43 (L) 45 - 117 U/L    Protein, total 6.5 6.4 - 8.2 g/dL    Albumin 3.6 3.5 - 5.0 g/dL    Globulin 2.9 2.0 - 4.0 g/dL    A-G Ratio 1.2 1.1 - 2.2     TROPONIN-HIGH SENSITIVITY    Collection Time: 01/20/23  2:01 PM   Result Value Ref Range    Troponin-High Sensitivity 12 0 - 51 ng/L   COVID-19 RAPID TEST    Collection Time: 01/20/23  2:01 PM   Result Value Ref Range    Specimen source Nasopharyngeal      COVID-19 rapid test Not detected NOTD     INFLUENZA A+B VIRAL AGS    Collection Time: 01/20/23  2:01 PM   Result Value Ref Range    Influenza A Antigen Negative NEG      Influenza B Antigen Negative NEG     SED RATE (ESR)    Collection Time: 01/20/23  2:01 PM   Result Value Ref Range    Sed rate, automated 9 0 - 30 mm/hr   BLOOD GAS,CHEM8,LACTIC ACID POC    Collection Time: 01/20/23  2:09 PM   Result Value Ref Range    Calcium, ionized (POC) 1.24 1.12 - 1.32 mmol/L    BICARBONATE 25 mmol/L    Base deficit (POC) 0.2 mmol/L    Sample source VENOUS BLOOD      CO2, POC 25 (H) 19 - 24 MMOL/L    Sodium,  136 - 145 MMOL/L    Potassium, POC 3.7 3.5 - 5.5 MMOL/L    Chloride,  100 - 108 MMOL/L    Glucose, POC 87 74 - 106 MG/DL    Creatinine, POC 1.4 (H) 0.6 - 1.3 MG/DL    Lactic Acid (POC) 1.02 0.40 - 2.00 mmol/L    pH, venous (POC) 7.37 7.32 - 7.42      pCO2, venous (POC) 44.0 41 - 51 MMHG    pO2, venous (POC) 35 25 - 40 mmHg   EKG, 12 LEAD, INITIAL    Collection Time: 01/20/23  2:56 PM   Result Value Ref Range    Ventricular Rate 66 BPM    Atrial Rate 66 BPM    P-R Interval 168 ms    QRS Duration 108 ms    Q-T Interval 436 ms    QTC Calculation (Bezet) 457 ms    Calculated P Axis 19 degrees    Calculated R Axis -14 degrees    Calculated T Axis 0 degrees    Diagnosis       Normal sinus rhythm  Minimal voltage criteria for LVH, may be normal variant  Nonspecific ST and T wave abnormality  When compared with ECG of 28-JAN-2018 00:37,  Nonspecific T wave abnormality no longer evident in Lateral leads     URINALYSIS W/ REFLEX CULTURE    Collection Time: 01/20/23  3:44 PM    Specimen: Urine   Result Value Ref Range    Color YELLOW/STRAW      Appearance CLOUDY (A) CLEAR      Specific gravity 1.014      pH (UA) 5.5 5.0 - 8.0      Protein Negative NEG mg/dL    Glucose Negative NEG mg/dL    Ketone Negative NEG mg/dL    Bilirubin Negative NEG      Blood Negative NEG      Urobilinogen 1.0 0.2 - 1.0 EU/dL    Nitrites Negative NEG      Leukocyte Esterase LARGE (A) NEG      WBC >100 (H) 0 - 4 /hpf    RBC 0-5 0 - 5 /hpf    Epithelial cells FEW FEW /lpf    Bacteria 2+ (A) NEG /hpf    UA:UC IF INDICATED URINE CULTURE ORDERED (A) CNI     PROCALCITONIN    Collection Time: 01/20/23  4:01 PM   Result Value Ref Range    Procalcitonin 0.05 ng/mL   CK    Collection Time: 01/20/23  4:01 PM   Result Value Ref Range    CK 41 26 - 192 U/L       EKG as read by me shows NSR rate 66, LAD, no LVH, normal intervals  Normal intervals, non specific ST-T changes      CT Results  (Last 48 hours)      None              XR HIP RT W OR WO PELV 2-3 VWS    Result Date: 1/20/2023  No acute abnormality. XR CHEST PORT    Result Date: 1/20/2023  No acute process on portable chest.        I saw the patient personally, took a history and did a complete physical exam at the bedside. I performed complex decision making in coming up with a diagnostic and treatment plan for the patient.  I reviewed the patient's past medical records, current laboratory and radiology results, and actual Xray films/EKG. I have also discussed this case with the involved ED physician.     Care Plan discussed with:    Patient, Family, ED Doc    Risk of deterioration:  High    Total Time Coordinating Admission:  65   minutes    Total Critical Care Time:         Deborah Crawford MD

## 2023-01-21 NOTE — PROGRESS NOTES
Problem: Mobility Impaired (Adult and Pediatric)  Goal: *Acute Goals and Plan of Care (Insert Text)  Description: FUNCTIONAL STATUS PRIOR TO ADMISSION: Patient was supervision level of transfers from w/c<>bed and use of LEs for w/c propulsion. HOME SUPPORT PRIOR TO ADMISSION: [facility] and per pt receives limited assistance for functional mobility. Physical Therapy Goals  Initiated 1/21/2023  1. Patient will move from supine to sit and sit to supine  in bed with minimal assistance/contact guard assist within 7 day(s). 2.  Patient will transfer from bed to chair and chair to bed with minimal assistance/contact guard assist using the least restrictive device within 7 day(s). 3.  Patient will perform sit to stand with minimal assistance/contact guard assist within 7 day(s). Outcome: Not Met     PHYSICAL THERAPY EVALUATION  Patient: Janine Pickett (70 y.o. female)  Date: 1/21/2023  Primary Diagnosis: Acute metabolic encephalopathy [H73.44]       Precautions: fall risk       ASSESSMENT  Based on the objective data described below, the patient presents with reduced LE strength, impaired balance and increased need for assist with functional mobility s/p hospitalization of R foot and calf pain. Patient educated on the purpose and benefits of skilled PT and goals to be addressed with pt verbalizing good understanding. Patient directed in supine to sit with increased time and direction for sequencing and max A. Pt requires mod A to return to bed. Pt then engaged in rolling bilaterally with mod A and instruction for sequencing. Pt can continue to benefit from skilled PT to improve independence with functional mobility prior to discharge when medically appropriate. Current Level of Function Impacting Discharge (mobility/balance): max A bed mobility    Functional Outcome Measure:   The patient scored 30/100 on the modified barthel index outcome measure which is indicative of pt is 70% dependent on others for ADLs and functional mobility. Other factors to consider for discharge: pt presents below baseline function     Patient will benefit from skilled therapy intervention to address the above noted impairments. PLAN :  Recommendations and Planned Interventions: bed mobility training, transfer training, therapeutic exercises, neuromuscular re-education, patient and family training/education, and therapeutic activities      Frequency/Duration: Patient will be followed by physical therapy:  4 times a week to address goals. Recommendation for discharge: (in order for the patient to meet his/her long term goals)  To be determined: SNF vs back to [facility] depending on how much assist pt can provide    This discharge recommendation:  Has been made in collaboration with the attending provider and/or case management    IF patient discharges home will need the following DME: patient owns DME required for discharge         SUBJECTIVE:   Patient stated [facility] R leg hurts.     OBJECTIVE DATA SUMMARY:   HISTORY:    Past Medical History:   Diagnosis Date    Abnormal cardiovascular stress test 04/18/08    Dr. Laila West    Allergy, unspecified not elsewhere classified     Bell's palsy 02/2005    Left. Dr. Yolanda Fernández    CAD (coronary artery disease)     Dr. Diego Andrade. Dr. Jerilyn Ballard    Chest pain 2002    Dr. Tammy March    Chronic headache 2013    due to depression. Dr. Meena Rhodes. Chronic pain     LEGS AND FEET; BACK UNDER RIGHT SHOULDER    Congenital eye disorder     Lazy eyes. Dr. Hetal Rosas    Dementia [facility])     Dr. Thiago Moody. Depression 2011    Dr. Corinna Limon (psychologist). Dr. Meena Rhodes. Dr. Wan Perez. DJD (degenerative joint disease)     knees, low back. Dr. Mary Dudley. Dr. Holly Eisenberg. Dr. Prem Baxter    Gait instability 02/10/09    Dr. Coleman Galicia. Dr. Meena Rhodes.     Gallstones     GERD (gastroesophageal reflux disease)     Hiatal hernia 1977    Hypercholesteremia     Hypertension     Ill-defined condition     \"JUST ONE NIGHT I WENT TO STAND UP AND I JUST COULDN'T STAND\"  PT NOT SURE WHY    Leg weakness 12/2009    Dr. Leslie Anderson. Dr. Radha Muir. Lower extremity venous stasis     Dr. Shirley Sanderson    Lumbar spinal stenosis 12/2009    L3-5. Dr. Lencho Burrows. PARAPLEGIA SINCE 2012    Lymphedema 12/19/08    Dr. Amarilis Ba then Dr. Nevaeh Diaz    Memory loss 2013    Dr. Kristie Davalos. Menopause     Migraine     Ocular. Dr. Hood Merritt    NORMA (obstructive sleep apnea) 01/24/07    Dr. Amber Alonso. NO CPAP    Osteoporosis 02/10/09    Dr. Chava Castellon    Pancreatitis 03/2011    Post-menopausal bleeding 07/2016    due to Dr. Nino Pereira Jr.    Rotator cuff injury     Left. Dr. Alina Mendoza; chronic bilateral    Seborrheic keratosis 2005    Dr. Eliceo Sin 02/05/09    Dr. Boaz Felder    Tremor 2013    Dr. Christine Aleman. Unspecified vitamin D deficiency 07/2009    Urinary incontinence 2013    Dr. Jerome Gibson    Venous insufficiency 1998    R>L. with chronic leg edema. Dr. Nevaeh Diaz     Past Surgical History:   Procedure Laterality Date    HX HEART CATHETERIZATION  06/2008    Dr. Wayne Echavarria. due to abnormal Stress Test    HX MAR AND BSO  09/14/2016    Dr. Kanika Salazar Jr     HYSTEROSCOPIC MYOMECTOMY  07/27/2016    D AND C WITH SYMPHION, RESECTION OF ENDOMETRIAL TISSUE. benign. due to postmenopausal bleeding.   Lamar Simon.       Personal factors and/or comorbidities impacting plan of care: w/c bound    Home Situation  Home Environment: Assisted living  One/Two Story Residence: One story  Living Alone: No  Support Systems: Assisted Living  Current DME Used/Available at Home: Grab bars, Wheelchair    EXAMINATION/PRESENTATION/DECISION MAKING:   Critical Behavior:              Hearing:     Skin:    Edema:   Range Of Motion:  AROM: Generally decreased, functional           PROM: Within functional limits           Strength:    Strength: Grossly decreased, non-functional                    Tone & Sensation:   Tone: Normal              Sensation: Intact               Coordination:  Coordination: Within functional limits  Vision:      Functional Mobility:  Bed Mobility:     Supine to Sit: Maximum assistance  Sit to Supine: Moderate assistance  Scooting: Maximum assistance  Transfers:                             Balance:   Sitting: Impaired  Sitting - Static: Good (unsupported)  Sitting - Dynamic: Fair (occasional)  Ambulation/Gait Training:                                                         Stairs: Therapeutic Exercises:       Functional Measure:  Barthel Index:    Bathin  Bladder: 0  Bowels: 10  Groomin  Dressin  Feeding: 10  Mobility: 0  Stairs: 0  Toilet Use: 0  Transfer (Bed to Chair and Back): 5  Total: 30/100       The Barthel ADL Index: Guidelines  1. The index should be used as a record of what a patient does, not as a record of what a patient could do. 2. The main aim is to establish degree of independence from any help, physical or verbal, however minor and for whatever reason. 3. The need for supervision renders the patient not independent. 4. A patient's performance should be established using the best available evidence. Asking the patient, friends/relatives and nurses are the usual sources, but direct observation and common sense are also important. However direct testing is not needed. 5. Usually the patient's performance over the preceding 24-48 hours is important, but occasionally longer periods will be relevant. 6. Middle categories imply that the patient supplies over 50 per cent of the effort. 7. Use of aids to be independent is allowed.     Score Interpretation (from [address] [address])    Independent   60-79 Minimally independent   40-59 Partially dependent   20-39 Very dependent   <20 Totally dependent -Meet Green., Barthel, D.W. (0679). Functional evaluation: the Barthel Index. [address] ([address] (). The Barthel activities of daily living index: self-reporting versus actual performance in the old (> or = 75 years). Journal of [facility]4 [facility] 45(7), 14 [facility], [place], Nahid Johns., Ibrahima [place]. (1999). Measuring the change in disability after inpatient rehabilitation; comparison of the responsiveness of the Barthel Index and Functional Love Measure. Journal of Neurology, Neurosurgery, and Psychiatry, 66(4), 985-219. Kary Rodriges, N.J.A, TOMAS Mccurdy, & Caden Izquierdo MKaylaA. (2004) Assessment of post-stroke quality of life in cost-effectiveness studies: The usefulness of the Barthel Index and the EuroQoL-5D. Quality of Life Research, 15, 249-08           Physical Therapy Evaluation Charge Determination   History Examination Presentation Decision-Making   LOW Complexity : Zero comorbidities / personal factors that will impact the outcome / POC LOW Complexity : 1-2 Standardized tests and measures addressing body structure, function, activity limitation and / or participation in recreation  LOW Complexity : Stable, uncomplicated  LOW Complexity : FOTO score of       Based on the above components, the patient evaluation is determined to be of the following complexity level: LOW     Pain Ratin/10    Activity Tolerance:   Poor    After treatment patient left in no apparent distress:   Supine in bed and Call bell within reach    COMMUNICATION/EDUCATION:   The patients plan of care was discussed with: Registered nurse. Fall prevention education was provided and the patient/caregiver indicated understanding., Patient/family have participated as able in goal setting and plan of care. , and Patient/family agree to work toward stated goals and plan of care.     Thank you for this referral.  Darin Sykes, PT Time Calculation: 18 mins

## 2023-01-22 LAB
ALBUMIN SERPL-MCNC: 3.1 G/DL (ref 3.5–5)
ALBUMIN/GLOB SERPL: 1.1 (ref 1.1–2.2)
ALP SERPL-CCNC: 37 U/L (ref 45–117)
ALT SERPL-CCNC: 11 U/L (ref 12–78)
ANION GAP SERPL CALC-SCNC: 4 MMOL/L (ref 5–15)
AST SERPL-CCNC: 7 U/L (ref 15–37)
BACTERIA SPEC CULT: ABNORMAL
BASOPHILS # BLD: 0 K/UL (ref 0–0.1)
BASOPHILS NFR BLD: 1 % (ref 0–1)
BILIRUB SERPL-MCNC: 0.4 MG/DL (ref 0.2–1)
BUN SERPL-MCNC: 13 MG/DL (ref 6–20)
BUN/CREAT SERPL: 16 (ref 12–20)
CALCIUM SERPL-MCNC: 8.9 MG/DL (ref 8.5–10.1)
CC UR VC: ABNORMAL
CHLORIDE SERPL-SCNC: 112 MMOL/L (ref 97–108)
CO2 SERPL-SCNC: 26 MMOL/L (ref 21–32)
CREAT SERPL-MCNC: 0.83 MG/DL (ref 0.55–1.02)
DIFFERENTIAL METHOD BLD: ABNORMAL
EOSINOPHIL # BLD: 0.3 K/UL (ref 0–0.4)
EOSINOPHIL NFR BLD: 5 % (ref 0–7)
ERYTHROCYTE [DISTWIDTH] IN BLOOD BY AUTOMATED COUNT: 15.1 % (ref 11.5–14.5)
GLOBULIN SER CALC-MCNC: 2.7 G/DL (ref 2–4)
GLUCOSE SERPL-MCNC: 98 MG/DL (ref 65–100)
HCT VFR BLD AUTO: 33.2 % (ref 35–47)
HGB BLD-MCNC: 10.3 G/DL (ref 11.5–16)
IMM GRANULOCYTES # BLD AUTO: 0 K/UL (ref 0–0.04)
IMM GRANULOCYTES NFR BLD AUTO: 0 % (ref 0–0.5)
LYMPHOCYTES # BLD: 2.7 K/UL (ref 0.8–3.5)
LYMPHOCYTES NFR BLD: 47 % (ref 12–49)
MCH RBC QN AUTO: 30.5 PG (ref 26–34)
MCHC RBC AUTO-ENTMCNC: 31 G/DL (ref 30–36.5)
MCV RBC AUTO: 98.2 FL (ref 80–99)
MONOCYTES # BLD: 0.5 K/UL (ref 0–1)
MONOCYTES NFR BLD: 8 % (ref 5–13)
NEUTS SEG # BLD: 2.2 K/UL (ref 1.8–8)
NEUTS SEG NFR BLD: 39 % (ref 32–75)
NRBC # BLD: 0 K/UL (ref 0–0.01)
NRBC BLD-RTO: 0 PER 100 WBC
PLATELET # BLD AUTO: 270 K/UL (ref 150–400)
PMV BLD AUTO: 10.1 FL (ref 8.9–12.9)
POTASSIUM SERPL-SCNC: 4 MMOL/L (ref 3.5–5.1)
PROT SERPL-MCNC: 5.8 G/DL (ref 6.4–8.2)
RBC # BLD AUTO: 3.38 M/UL (ref 3.8–5.2)
SERVICE CMNT-IMP: ABNORMAL
SODIUM SERPL-SCNC: 142 MMOL/L (ref 136–145)
WBC # BLD AUTO: 5.7 K/UL (ref 3.6–11)

## 2023-01-22 PROCEDURE — 36415 COLL VENOUS BLD VENIPUNCTURE: CPT

## 2023-01-22 PROCEDURE — 85025 COMPLETE CBC W/AUTO DIFF WBC: CPT

## 2023-01-22 PROCEDURE — 74011250637 HC RX REV CODE- 250/637: Performed by: INTERNAL MEDICINE

## 2023-01-22 PROCEDURE — 87040 BLOOD CULTURE FOR BACTERIA: CPT

## 2023-01-22 PROCEDURE — 74011000250 HC RX REV CODE- 250: Performed by: INTERNAL MEDICINE

## 2023-01-22 PROCEDURE — 74011000258 HC RX REV CODE- 258: Performed by: INTERNAL MEDICINE

## 2023-01-22 PROCEDURE — 94760 N-INVAS EAR/PLS OXIMETRY 1: CPT

## 2023-01-22 PROCEDURE — 74011250636 HC RX REV CODE- 250/636: Performed by: INTERNAL MEDICINE

## 2023-01-22 PROCEDURE — 80053 COMPREHEN METABOLIC PANEL: CPT

## 2023-01-22 PROCEDURE — 65270000046 HC RM TELEMETRY

## 2023-01-22 RX ORDER — CEPHALEXIN 500 MG/1
500 CAPSULE ORAL 4 TIMES DAILY
Qty: 20 CAPSULE | Refills: 0 | Status: SHIPPED | OUTPATIENT
Start: 2023-01-22 | End: 2023-01-27

## 2023-01-22 RX ORDER — BUMETANIDE 1 MG/1
1 TABLET ORAL
Qty: 30 TABLET | Refills: 1 | Status: SHIPPED | OUTPATIENT
Start: 2023-01-22 | End: 2023-01-23 | Stop reason: SDUPTHER

## 2023-01-22 RX ADMIN — POLYETHYLENE GLYCOL 3350 17 G: 17 POWDER, FOR SOLUTION ORAL at 10:43

## 2023-01-22 RX ADMIN — ASPIRIN 81 MG: 81 TABLET, CHEWABLE ORAL at 10:42

## 2023-01-22 RX ADMIN — SODIUM CHLORIDE, PRESERVATIVE FREE 10 ML: 5 INJECTION INTRAVENOUS at 21:30

## 2023-01-22 RX ADMIN — EZETIMIBE 10 MG: 10 TABLET ORAL at 10:42

## 2023-01-22 RX ADMIN — CEFTRIAXONE 1 G: 1 INJECTION, POWDER, FOR SOLUTION INTRAMUSCULAR; INTRAVENOUS at 18:01

## 2023-01-22 RX ADMIN — SODIUM CHLORIDE, PRESERVATIVE FREE 10 ML: 5 INJECTION INTRAVENOUS at 14:09

## 2023-01-22 RX ADMIN — MIRTAZAPINE 7.5 MG: 15 TABLET, FILM COATED ORAL at 21:30

## 2023-01-22 RX ADMIN — ENOXAPARIN SODIUM 40 MG: 100 INJECTION SUBCUTANEOUS at 10:43

## 2023-01-22 RX ADMIN — SODIUM CHLORIDE, PRESERVATIVE FREE 10 ML: 5 INJECTION INTRAVENOUS at 06:54

## 2023-01-22 RX ADMIN — SERTRALINE 50 MG: 50 TABLET, FILM COATED ORAL at 10:43

## 2023-01-22 RX ADMIN — FENOFIBRATE 54 MG: 54 TABLET ORAL at 11:15

## 2023-01-22 RX ADMIN — PANTOPRAZOLE SODIUM 40 MG: 40 TABLET, DELAYED RELEASE ORAL at 06:57

## 2023-01-22 NOTE — PROGRESS NOTES
Pain continues to improve  Feet warm  Will check DALIA, but does not need to remain inpatient for this.

## 2023-01-22 NOTE — DISCHARGE SUMMARY
Hospitalist Discharge Summary     Patient ID:  Kaci Rincon  [id number]  75 y.o.  1940 1/20/2023    PCP on record: Maxine Snellen, DO    Admit date: 1/20/2023  Discharge date and time: 1/22/2023    DISCHARGE DIAGNOSIS:  As below     CONSULTATIONS:  IP CONSULT TO VASCULAR SURGERY    Excerpted HPI from H&P of Naz Moyer MD:  CHIEF COMPLAINT: \" My right calf and foot have been aching overnight\"     HISTORY OF PRESENT ILLNESS:     78year-old female     Reported history of cognitive impairment  Essential hypertension, hyperlipidemia, coronary artery disease     Denies a prior history of PAD     Resident of assisted living  Baseline is wheelchair-bound, but uses her legs to propel her self around     Some discomfort in her right foot and calf and lower thigh yesterday evening  Increasing aching pain this a.m. moderate  Pain just came \"on its own\"  Pain mamta severe in her right ankle and right foot  No weakness or numbness in the right foot  No trauma to the leg  No fevers, headache, sore throat, shortness of breath, chest pain  Chronic cough \"from allergies\"  No nausea vomiting, diarrhea, abdominal pain  No dysuria  Came to the emergency room because of the right foot and leg pain     Emergency room  Borderline low blood pressure 93/58  Received a 30 mL/kg bolus = 3.945 liters  Abnormal UA > 100 WBC, 0-5 RBCs, 2 + bacteria  No SIRS criteria  BUN/creat 31 and 1.36  Last baseline creat 0.8 to 0.96 in 2018  Chest x-ray with no airspace disease  Right hip pelvis x-ray with no fractures, + DJD   Could not clearly palpate dorsalis pedis or posterior tibial pulses bilaterally              2+ femoral pulses bilaterally  Doppler ultrasound not able to detect right dorsalis pedis, posterior tibials pulses  Able detect a left dorsalis pedis, left posterior tibial very faint  Able to flex toes and feet vs resistance in both feet  No numbness in feet  IV ceftriaxone  Blood and urine cultures were sent  ____________________________________________________________________  DISCHARGE SUMMARY/HOSPITAL COURSE:  for full details see H&P, daily progress notes, labs, consult notes. Right foot and calf pain POA  Diminished right dorsalis pedis, posterior tibial pulses POA  Probable PAD POA  Came to the emergency room because her right foot and ankle has been hurting overnight  No known PAD  No trauma to the leg  No clear skin changes, right middle toe bandaged  Could not clearly palpate dorsalis pedis or posterior tibial pulses bilaterally              2+ femoral pulses bilaterally  Doppler ultrasound not able to detect right dorsalis pedis, posterior tibials pulses  Able detect a left dorsalis pedis, left posterior tibial very faint  Able to flex toes and feet vs resistance in both feet  No numbness in feet  Mild pain on motion of ankle and knee  Differential              Ischemic foot pain              Fracture less likely              ? DVT              No clear evidence of cellulitis              ? Radicular pain, denies back pain              ? OA  Vascular surgery consulted, Limb not threatened, CTA shows chronic multilevel PAD, No indication for acute intervention, will restart diet, f/up with Vascular as outpt  Leg pain resolved completely on day of DC  LE dopplers neg for DVT  Right ankle and tib-fib x-rays neg for fracture/dislocation  Abdominal aortic CTA with LE runoff Focus of severe stenosis in the distal one third of the SFA on the right. Moderate to severe stenosis distal one third of the superficial femoral artery on the left. No definite in-line flow to the malleoli is demonstrated. Multifocal stenoses of the infrapopliteal vessels is suggested. Possible UTI POA  Abnormal UA > 100 WBC, 0-5 RBCs, 2 + bacteria  No SIRS criteria  No urinary symptoms  Blood Cx grew CNS, likely contaminant, repeat Cx neg  urine cultures grew E Coli   Rocephin while inpt, switch to Keflex on DC     ?  Mild acute kidney injury POA BUN/creat 31 and 1.36  Last baseline creat 0.8 to 0.96 in 2018  No more recent labs  S/p several liter IVF bolus in ED  Renal function improving      Essential HTN POA  Hyperlipidemia POA  CAD POA  Relative hypotension 93/58 in ED POA  Hold norvasc and lisinopril.  Switch Bumex to daily PRN  S/p IVF  Continue zetia, fenofibrate, ASA  PRN hydralazine     Mild cognitive impairment POA  Current alert and oriented X3, appropriate  Watch for delirium  Continue remeron     GERD  Continue PPI      All Micro Results       Procedure Component Value Units Date/Time    CULTURE, URINE [[id number]]  (Abnormal)  (Susceptibility) Collected: 01/20/23 1544    Order Status: Completed Specimen: Urine Updated: 01/22/23 1458     Special Requests: --        NO SPECIAL REQUESTS  Reflexed from [id number]       [place] Count --        >100,000  COLONIES/mL       Culture result: Escherichia coli       CULTURE, BLOOD [[id number]]  (Abnormal) Collected: 01/20/23 1400    Order Status: Completed Specimen: Blood Updated: 01/22/23 0938     Special Requests: NO SPECIAL REQUESTS        Culture result:       STAPHYLOCOCCUS SPECIES, COAGULASE NEGATIVE GROWING IN 1 OF 2 BOTTLES DRAWN SITE = LAC                  REMAINING BOTTLE(S) HAS/HAVE NO GROWTH SO FAR          CULTURE, BLOOD, PAIRED [[id number]] Collected: 01/22/23 0440    Order Status: Sent Specimen: Blood Updated: 01/22/23 0455    BLOOD CULTURE ID PANEL [[id number]]  (Abnormal) Collected: 01/20/23 1400    Order Status: Completed Specimen: Blood Updated: 01/21/23 1639     Acc. no. from Micro Order [id number]     Enterococcus faecalis Not detected        Enterococcus faecium Not detected        Listeria monocytogenes Not detected        Staphylococcus Detected        Staphylococcus aureus Not detected        Staph epidermidis Not detected        Staph lugdunensis Not detected        Streptococcus Not detected        Streptococcus agalactiae (Group B) Not detected        Streptococcus pneumoniae Not detected        Streptococcus pyogenes (Group A) Not detected        Acinetobacter calcoaceticus-baumanii complex Not detected        Bacteroides fragilis Not detected        Enterobacterales species Not detected        Enterobacter cloacae complex Not detected        Escherichia coli Not detected        Klebsiella aerogenes Not detected        Klebsiella oxytoca Not detected        Klebsiella pneumoniae group Not detected        Proteus Not detected        Salmonella Not detected        Serratia marcescens Not detected        Haemophilus influenzae Not detected        Neisseria meningitidis Not detected        Pseudomonas aeruginosa Not detected        Steno maltophilia Not detected        Candida albicans Not detected        Candida auris Not detected        Candida glabrata Not detected        Candida krusei Not detected        Candida parapsilosis Not detected        Candida tropicalis Not detected        Crypto neoformans/gattii Not detected        RESISTANT GENES:            Comment       False positive results may rarely occur. Correlate with clinical,epidemiologic, and other laboratory findings           Comment: Please see BCID Interpretation Guide in EPIC Links       CULTURE, BLOOD [[id number]] Collected: 01/20/23 1529    Order Status: Completed Specimen: Blood Updated: 01/21/23 1115     Special Requests: NO SPECIAL REQUESTS        Culture result: NO GROWTH AFTER 19 HOURS       MICRO TRACKING [[id number]] Collected: 01/20/23 1401    Order Status: No result Updated: 01/21/23 0953    COVID-19 RAPID TEST [[id number]] Collected: 01/20/23 1401    Order Status: Completed Specimen: Nasopharyngeal Updated: 01/20/23 1446     Specimen source Nasopharyngeal        COVID-19 rapid test Not detected        Comment: Rapid Abbott ID Now       Rapid NAAT:  The specimen is NEGATIVE for SARS-CoV-2, the novel coronavirus associated with COVID-19.        Negative results should be treated as presumptive and, if inconsistent with clinical signs and symptoms or necessary for patient management, should be tested with an alternative molecular assay. Negative results do not preclude SARS-CoV-2 infection and should not be used as the sole basis for patient management decisions. This test has been authorized by the FDA under an Emergency Use Authorization (EUA) for use by authorized laboratories. Fact sheet for Healthcare Providers:  http://www.christofer.margret/  Fact sheet for Patients: http://www.christofer.margret/       Methodology: Isothermal Nucleic Acid Amplification                  _______________________________________________________________________  Patient seen and examined by me on discharge day. POC d/w pt, all question/concerns addressed and pt verbalized understanding and agree with the plan above. Pertinent Findings:  Gen:    Not in distress  Chest: Clear lungs  CVS:   Regular rhythm. No edema  Abd/: Soft, not distended, not tender  Neuro:  Alert, oriented X3  _______________________________________________________________________  DISCHARGE MEDICATIONS:   Current Discharge Medication List        START taking these medications    Details   cephALEXin (Keflex) 500 mg capsule Take 1 Capsule by mouth four (4) times daily for 5 days. Qty: 20 Capsule, Refills: 0  Start date: 1/22/2023, End date: 1/27/2023           CONTINUE these medications which have CHANGED    Details   bumetanide (BUMEX) 1 mg tablet Take 1 Tablet by mouth daily as needed for PRN Reason (Other) (fluid retention, leg swelling). TAKE 1 TABLET BY MOUTH EVERY DAY  Qty: 30 Tablet, Refills: 1  Start date: 1/22/2023           CONTINUE these medications which have NOT CHANGED    Details   !! acetaminophen (TYLENOL) 500 mg tablet Take 1,000 mg by mouth three (3) times daily. fenofibric acid (TRILIPIX ER) 45 mg capsule Take 45 mg by mouth daily.       !! acetaminophen (TYLENOL) 325 mg tablet Take 650 mg by mouth every six (6) hours as needed for Pain.      emollient combination no. 114 (Eucerin Advanced Repair) crea Apply 1 Each to affected area as needed (skin dryness). Cetaphil topical cream Apply 1 Each to affected area as needed for Dry Skin. diclofenac (VOLTAREN) 1 % gel Apply 2 g to affected area three (3) times daily as needed for Pain (to both knees). ezetimibe (ZETIA) 10 mg tablet Take 10 mg by mouth. ondansetron (ZOFRAN ODT) 8 mg disintegrating tablet Take 8 mg by mouth every eight (8) hours as needed. cholecalciferol (VITAMIN D3) 25 mcg (1,000 unit) cap Take 1,000 Units by mouth daily. codeine-butalbital-aspirin-caffeine (FIORINAL WITH CODEINE) 35--40 mg per capsule Take  by mouth every six (6) hours as needed for Pain. sertraline (ZOLOFT) 50 mg tablet Take 50 mg by mouth daily. pantoprazole (PROTONIX) 40 mg tablet Take 40 mg by mouth two (2) times a day. mirtazapine (REMERON SOL-TAB) 15 mg disintegrating tablet Take 7.5 mg by mouth. aspirin 81 mg chewable tablet Take 1 Tab by mouth daily. Indications: prevention of cerebrovascular accident  Qty: 90 Tab, Refills: 3       !! - Potential duplicate medications found. Please discuss with provider. STOP taking these medications       potassium chloride SR (KLOR-CON 10) 10 mEq tablet Comments:   Reason for Stopping:         amlodipine (NORVASC) 2.5 mg tablet Comments:   Reason for Stopping:         lisinopril (PRINIVIL, ZESTRIL) 5 mg tablet Comments:   Reason for Stopping:                 Patient Follow Up Instructions: Activity: Activity as tolerated  Diet: ADULT DIET Regular  Wound Care: None needed  Follow-up with PCP in  1 week. Follow-up tests/labs DALIA as outpt   If you have any concerns that you feel you need to come back to the hospital, please do not hesitate.     Follow-up Information       Follow up With Specialties Details Why [address] [place] [address] Drive, [facility], DO Family Medicine, Bariatrics Follow up in 1 week(s)      Demian Guardado MD Vascular Surgery Schedule an appointment as soon as possible for a visit in 1 week(s)  [address] [facility]  [phone]            ________________________________________________________________    Risk of deterioration: Moderate    Condition at Discharge:  Stable  __________________________________________________________________    Disposition  Home with family and home health services    ____________________________________________________________________    Code Status: Full Code  ___________________________________________________________________      Total time in minutes spent coordinating this discharge (includes going over instructions, follow-up, prescriptions, and preparing report for sign off to her PCP) :  38 minutes    Signed:  Angel Luis Villalobos MD

## 2023-01-22 NOTE — PROGRESS NOTES
Transition of Care Plan:    RUR: 11%  Disposition: [facility] (Assisted Living)  If SNF or IPR: Date FOC offered:  Date FOC received:  Date authorization started with reference number:  Date authorization received and expires:  Accepting facility:  Follow up appointments: PCP  DME needed: None  Transportation at Discharge: Pt will need medical transport at d/c.   [employer] or means to access home:    Pt has access    IM Medicare Letter: 2nd IM Letter needed at d/c. Is patient a [place] and connected with the [place]? No            If yes, was [employer] transfer form completed and VA notified? Caregiver Contact: Joaquín Santana- Brother [phone]  Discharge Caregiver contacted prior to discharge? CM will notify caregiver at d/c. Care Conference needed?:  No    Reason for Admission:   Acute metabolic encephalopathy. RUR Score:    11% Low risk for readmission               PCP: First and Last name:  Unknown     Name of Practice:    Are you a current patient: Yes/No:    Approximate date of last visit:    Can you participate in a virtual visit if needed:     Do you (patient/family) have any concerns for transition/discharge? None              Plan for utilizing home health:   Pt will not be utilizing home health at d/c    Current Advanced Directive/Advance Care Plan:  Full Code; Pt has ACP on file. Advance Care Planning   Healthcare Decision Maker: Elda Fernandes         Click here to complete [address] including selection of the Healthcare Decision Maker Relationship (ie \"Primary\")  Today we documented Decision Maker(s) consistent with ACP documents on file. CM met with pt at bedside to discuss d/c plan. Pt was confused. CM called pt's brother via phone to complete initial assessment. CM verified pt's demographics and insurance. Pt's PCP is not longer with practice and pt sees NP at [facility].     Pt is a 77 y/o [place] American female admitted to [facility] on 1/22/2023 for acute metabolic encephalopathy. Pt sees NP at [facility]. Pt uses [facility] for Rx. Pt resides at [facility]. Pt does not drive. Pt needs assistance with ADL's and IADL's. Pt is FULL code status and has ACP on file. Pt will need transportation arranged at d/c. Care Management Interventions  PCP Verified by CM: No (Pt sees NP at [facility].)  Mode of Transport at Discharge: Other (see comment) (Pt will need transportation arranged at d/c.     )  Transition of Care Consult (CM Consult): Discharge Planning [facility] (Assisted Living))  Discharge Durable Medical Equipment: No (Pt has a wheelchair)  Physical Therapy Consult: Yes  Occupational Therapy Consult: Yes  Speech Therapy Consult: No  Support Systems: Other Family Member(s) (Pt resides at [address] [place] [address] Follow Up Transport: Other (see comment) (Pt does not drive)  [employer] Provided?: No  Discharge Location  Patient Expects to be Discharged to[de-identified]  [facility] (Assisted Living))    CM will continue to follow patient for discharge planning needs and arrange for services as deemed necessary.     Abel Roberts [address] [place] [address]  [phone]

## 2023-01-22 NOTE — DISCHARGE INSTRUCTIONS
cephalexin  Pronunciation:  sef a KATHARINE in  Brand:  Keflex  What is the most important information I should know about cephalexin? You should not use this medicine if you are allergic to cephalexin or to similar antibiotics, such as Ceftin, Cefzil, Omnicef, and others. Tell your doctor if you are allergic to any drugs, especially penicillins or other antibiotics. What is cephalexin? Cephalexin is a cephalosporin (SEF a low spor in) antibiotic that is used to treat bacterial infections of the lungs, ear, skin, bones, bladder, and kidneys. Cephalexin is used to treat infections in adults and children who are at least 3year old. Cephalexin may also be used for purposes not listed in this medication guide. What should I discuss with my healthcare provider before taking cephalexin? You should not use this medicine if you are allergic to cephalexin or any other cephalosporin antibiotic (cefdinir, cefadroxil, cefoxitin, cefprozil, ceftriaxone, cefuroxime, Omnicef, and others). Tell your doctor if you have ever had:  an allergy to any drug (especially penicillin);  liver or kidney disease; or  intestinal problems, such as colitis. The liquid form of cephalexin may contain sugar. This may affect you if you have diabetes. Tell your doctor if you are pregnant or breast-feeding. How should I take cephalexin? Follow all directions on your prescription label and read all medication guides or instruction sheets. Use the medicine exactly as directed. Do not use cephalexin to treat any condition that has not been checked by your doctor. Measure liquid medicine carefully. Use the dosing syringe provided, or use a medicine dose-measuring device (not a kitchen spoon). Use this medicine for the full prescribed length of time, even if your symptoms quickly improve. Skipping doses can increase your risk of infection that is resistant to medication.  Cephalexin will not treat a viral infection such as the flu or a common cold.  Do not share cephalexin with another person, even if they have the same symptoms you have. This medicine can affect the results of certain medical tests. Tell any doctor who treats you that you are using cephalexin. Store the tablets and capsules at room temperature away from moisture, heat, and light. Store the liquid medicine in the refrigerator. Throw away any unused liquid after 14 days. What happens if I miss a dose? Take the medicine as soon as you can, but skip the missed dose if it is almost time for your next dose. Do not take two doses at one time. What happens if I overdose? Seek emergency medical attention or call the Poison Help line at 1-235.326.4702. Overdose symptoms may include nausea, vomiting, stomach pain, diarrhea, and blood in your urine. What should I avoid while taking cephalexin? Antibiotic medicines can cause diarrhea, which may be a sign of a new infection. If you have diarrhea that is watery or bloody, call your doctor before using anti-diarrhea medicine. What are the possible side effects of cephalexin? Get emergency medical help if you have signs of an allergic reaction (hives, difficult breathing, swelling in your face or throat) or a severe skin reaction (fever, sore throat, burning eyes, skin pain, red or purple skin rash with blistering and peeling). Call your doctor at once if you have:  severe stomach pain, diarrhea that is watery or bloody (even if it occurs months after your last dose);  unusual tiredness, feeling light-headed or short of breath;  easy bruising, unusual bleeding, purple or red spots under your skin;  a seizure;  pale skin, cold hands and feet;  yellowed skin, dark colored urine;  fever, weakness; or  pain in your side or lower back, painful urination. Common side effects may include:  diarrhea;  nausea, vomiting;  indigestion, stomach pain; or  vaginal itching or discharge. This is not a complete list of side effects and others may occur. Call your doctor for medical advice about side effects. You may report side effects to FDA at 8-584-MAR-6586. What other drugs will affect cephalexin? Tell your doctor about all your other medicines, especially:  metformin; or  probenecid. This list is not complete. Other drugs may affect cephalexin, including prescription and over-the-counter medicines, vitamins, and herbal products. Not all possible drug interactions are listed here. Where can I get more information? Your pharmacist can provide more information about cephalexin. Remember, keep this and all other medicines out of the reach of children, never share your medicines with others, and use this medication only for the indication prescribed. Every effort has been made to ensure that the information provided by [address] [place] [address]  is accurate, up-to-date, and complete, but no guarantee is made to that effect. Drug information contained herein may be time sensitive. [facility] information has been compiled for use by healthcare practitioners and consumers in the [place] and therefore [employer] does not warrant that uses outside of the [place] are appropriate, unless specifically indicated otherwise. [facility] drug information does not endorse drugs, diagnose patients or recommend therapy. [employer] [facility] drug information is an informational resource designed to assist licensed healthcare practitioners in caring for their patients and/or to serve consumers viewing this service as a supplement to, and not a substitute for, the expertise, skill, knowledge and judgment of healthcare practitioners. The absence of a warning for a given drug or drug combination in no way should be construed to indicate that the drug or drug combination is safe, effective or appropriate for any given patient. [facility] does not assume any responsibility for any aspect of healthcare administered with the aid of information [facility] provides.  The information contained herein is not intended to cover all possible uses, directions, precautions, warnings, drug interactions, allergic reactions, or adverse effects. If you have questions about the drugs you are taking, check with your doctor, nurse or pharmacist.  Copyright 6898-6326 [address]: 10.03. Revision date: 1/4/2021. Care instructions adapted under license by [employer] (which disclaims liability or warranty for this information). If you have questions about a medical condition or this instruction, always ask your healthcare professional. [place] [address] any warranty or liability for your use of this information. Urinary Tract Infection (UTI) in Women: Care Instructions  Overview     A urinary tract infection, or UTI, is a general term for an infection anywhere between the kidneys and the urethra (where urine comes out). Most UTIs are bladder infections. They often cause pain or burning when you urinate. UTIs are caused by bacteria and can be cured with antibiotics. Be sure to complete your treatment so that the infection does not get worse. Follow-up care is a key part of your treatment and safety. Be sure to make and go to all appointments, and call your doctor if you are having problems. It's also a good idea to know your test results and keep a list of the medicines you take. How can you care for yourself at home? Take your antibiotics as directed. Do not stop taking them just because you feel better. You need to take the full course of antibiotics. Drink extra water and other fluids for the next day or two. This will help make the urine less concentrated and help wash out the bacteria that are causing the infection. (If you have kidney, heart, or liver disease and have to limit fluids, talk with your doctor before you increase the amount of fluids you drink.)  Avoid drinks that are carbonated or have caffeine. They can irritate the bladder. Urinate often.  Try to empty your bladder each time. To relieve pain, take a hot bath or lay a heating pad set on low over your lower belly or genital area. Never go to sleep with a heating pad in place. To prevent UTIs  Drink plenty of water each day. This helps you urinate often, which clears bacteria from your system. (If you have kidney, heart, or liver disease and have to limit fluids, talk with your doctor before you increase the amount of fluids you drink.)  Urinate when you need to. If you are sexually active, urinate right after you have sex. Change sanitary pads often. Avoid douches, bubble baths, feminine hygiene sprays, and other feminine hygiene products that have deodorants. After going to the bathroom, wipe from front to back. When should you call for help? Call your doctor now or seek immediate medical care if:    Symptoms such as fever, chills, nausea, or vomiting get worse or appear for the first time. You have new pain in your back just below your rib cage. This is called flank pain. There is new blood or pus in your urine. You have any problems with your antibiotic medicine. Watch closely for changes in your health, and be sure to contact your doctor if:    You are not getting better after taking an antibiotic for 2 days. Your symptoms go away but then come back. Where can you learn more? Go to http://www.gray.com/  Enter N728 in the search box to learn more about \"Urinary Tract Infection (UTI) in Women: Care Instructions. \"  Current as of: June 16, 2022               Content Version: 13.4  © 2006-2022 [employer]. Care instructions adapted under license by [employer] (which disclaims liability or warranty for this information). If you have questions about a medical condition or this instruction, always ask your healthcare professional. [address] any warranty or liability for your use of this information. Peripheral Arterial Disease (PAD): Care Instructions  Overview  Peripheral arterial disease (PAD) occurs when the blood vessels (arteries) that supply blood to the legs, belly, pelvis, arms, or neck get narrow or blocked. This reduces blood flow to that area. The legs are affected most often. PAD is often caused by fatty buildup (plaque) in the arteries. This buildup is also called \"hardening\" of the arteries. Your risk of PAD increases if you smoke or have high cholesterol, high blood pressure, diabetes, or a family history of PAD. Many people don't have symptoms. If you do have symptoms, you may have weak or tired legs, difficulty walking or balancing, or pain. If you have pain, you might feel a tight, aching, or squeezing pain in the calf, foot, thigh, or buttock that occurs during exercise. The pain usually gets worse during exercise and goes away when you rest. If PAD gets worse, you may have symptoms of poor blood flow, such as leg pain when you rest.  Medicines and lifestyle changes may help your symptoms and lower your risk of heart attack and stroke. In some cases, surgery or other treatment is needed. It is important that you follow up with your doctor. Follow-up care is a key part of your treatment and safety. Be sure to make and go to all appointments, and call your doctor if you are having problems. It's also a good idea to know your test results and keep a list of the medicines you take. How can you care for yourself at home? Do not smoke. Smoking can make PAD worse. If you need help quitting, talk to your doctor about stop-smoking programs and medicines. These can increase your chances of quitting for good. Take your medicines exactly as prescribed. Call your doctor if you think you are having a problem with your medicine. If you take a blood thinner, such as aspirin, be sure to get instructions about how to take your medicine safely. Blood thinners can cause serious bleeding problems.   Ask your doctor if a cardiac rehab program is right for you. Cardiac rehab can help you make lifestyle changes. In cardiac rehab, a team of health professionals provides education and support to help you make new, healthy habits. Eat heart-healthy foods such as fruits, vegetables, whole grains, fish, lean meats, and low-fat or nonfat dairy foods. Limit sodium, sugar, and alcohol. If your doctor recommends it, get more exercise. Walking is a good choice. Bit by bit, increase the amount you walk every day. Try for at least 30 minutes on most days of the week. If you have symptoms when you exercise, ask your doctor about a special exercise program that may help relieve your symptoms. Stay at a healthy weight. Lose weight if you need to. Take good care of your feet. Treat cuts and scrapes on your legs right away. Poor blood flow prevents (or slows) quick healing of even small cuts or scrapes. This is even more important if you have diabetes. Avoid shoes that are too tight or that rub your feet. Shoes should be comfortable and fit well. Avoid socks or stockings that are tight enough to leave elastic-band marks on your legs. Tight socks can make circulation problems worse. Keep your feet clean and moisturized to prevent drying and cracking. Place cotton or lamb's wool between your toes to prevent rubbing and to absorb moisture. If you have a sore on your leg or foot, keep it dry and cover it with a nonstick bandage until you see your doctor. When should you call for help? Call 911 anytime you think you may need emergency care. For example, call if:    You have symptoms of a heart attack. These may include:  Chest pain or pressure, or a strange feeling in the chest.  Sweating. Shortness of breath. Nausea or vomiting. Pain, pressure, or a strange feeling in the back, neck, jaw, or upper belly or in one or both shoulders or arms. Lightheadedness or sudden weakness. A fast or irregular heartbeat.    After you call 911, the  may tell you to chew 1 adult-strength or 2 to 4 low-dose aspirin. Wait for an ambulance. Do not try to drive yourself. You have sudden, severe leg pain, and your leg is cool and pale. You have symptoms of a stroke. These may include:  Sudden numbness, tingling, weakness, or loss of movement in your face, arm, or leg, especially on only one side of your body. Sudden vision changes. Sudden trouble speaking. Sudden confusion or trouble understanding simple statements. Sudden problems with walking or balance. A sudden, severe headache that is different from past headaches. Call your doctor now or seek immediate medical care if:    You have leg pain that does not go away even if you rest.     Your leg pain changes or gets worse. For example, if you have more pain with normal activity or the same pain with decreased activity, you should call. You have cold or numb feet or toes. You have leg or foot sores that are slow to heal.     The skin on your legs or feet changes color. You have an open sore on your leg or foot that is infected. Signs of infection include: Increased pain, swelling, warmth, or redness. Red streaks leading from the sore. Pus draining from the sore. A fever. Watch closely for changes in your health, and be sure to contact your doctor if you have any problems. Where can you learn more? Go to http://www.gray.com/  Enter H735 in the search box to learn more about \"Peripheral Arterial Disease (PAD): Care Instructions. \"  Current as of: January 10, 2022               Content Version: 13.4  © 2006-2022 [employer]. Care instructions adapted under license by [employer] (which disclaims liability or warranty for this information).  If you have questions about a medical condition or this instruction, always ask your healthcare professional. [place] [address] any warranty or liability for your use of this information.

## 2023-01-23 ENCOUNTER — APPOINTMENT (OUTPATIENT)
Dept: VASCULAR SURGERY | Age: 83
DRG: 556 | End: 2023-01-23
Attending: SURGERY
Payer: MEDICARE

## 2023-01-23 PROCEDURE — 65270000046 HC RM TELEMETRY

## 2023-01-23 PROCEDURE — 74011000258 HC RX REV CODE- 258: Performed by: INTERNAL MEDICINE

## 2023-01-23 PROCEDURE — 97165 OT EVAL LOW COMPLEX 30 MIN: CPT | Performed by: OCCUPATIONAL THERAPIST

## 2023-01-23 PROCEDURE — 74011250636 HC RX REV CODE- 250/636: Performed by: INTERNAL MEDICINE

## 2023-01-23 PROCEDURE — 93922 UPR/L XTREMITY ART 2 LEVELS: CPT

## 2023-01-23 PROCEDURE — 94760 N-INVAS EAR/PLS OXIMETRY 1: CPT

## 2023-01-23 PROCEDURE — 97530 THERAPEUTIC ACTIVITIES: CPT | Performed by: OCCUPATIONAL THERAPIST

## 2023-01-23 PROCEDURE — 74011000250 HC RX REV CODE- 250: Performed by: INTERNAL MEDICINE

## 2023-01-23 PROCEDURE — 74011250637 HC RX REV CODE- 250/637: Performed by: INTERNAL MEDICINE

## 2023-01-23 RX ORDER — BUMETANIDE 1 MG/1
1 TABLET ORAL
Qty: 30 TABLET | Refills: 1 | Status: SHIPPED | OUTPATIENT
Start: 2023-01-23

## 2023-01-23 RX ADMIN — POLYETHYLENE GLYCOL 3350 17 G: 17 POWDER, FOR SOLUTION ORAL at 09:24

## 2023-01-23 RX ADMIN — SODIUM CHLORIDE, PRESERVATIVE FREE 10 ML: 5 INJECTION INTRAVENOUS at 21:59

## 2023-01-23 RX ADMIN — SODIUM CHLORIDE, PRESERVATIVE FREE 10 ML: 5 INJECTION INTRAVENOUS at 18:07

## 2023-01-23 RX ADMIN — EZETIMIBE 10 MG: 10 TABLET ORAL at 09:24

## 2023-01-23 RX ADMIN — FENOFIBRATE 54 MG: 54 TABLET ORAL at 09:34

## 2023-01-23 RX ADMIN — SERTRALINE 50 MG: 50 TABLET, FILM COATED ORAL at 09:24

## 2023-01-23 RX ADMIN — CEFTRIAXONE 1 G: 1 INJECTION, POWDER, FOR SOLUTION INTRAMUSCULAR; INTRAVENOUS at 18:07

## 2023-01-23 RX ADMIN — ASPIRIN 81 MG: 81 TABLET, CHEWABLE ORAL at 09:24

## 2023-01-23 RX ADMIN — PANTOPRAZOLE SODIUM 40 MG: 40 TABLET, DELAYED RELEASE ORAL at 09:24

## 2023-01-23 RX ADMIN — MIRTAZAPINE 7.5 MG: 15 TABLET, FILM COATED ORAL at 21:58

## 2023-01-23 RX ADMIN — ENOXAPARIN SODIUM 40 MG: 100 INJECTION SUBCUTANEOUS at 09:24

## 2023-01-23 RX ADMIN — SODIUM CHLORIDE, PRESERVATIVE FREE 10 ML: 5 INJECTION INTRAVENOUS at 05:59

## 2023-01-23 NOTE — PROGRESS NOTES
Transition of Care Plan:     RUR: 12%  Disposition: [facility] ([facility]isted Living)  If SNF or IPR: Date FOC offered:  Date FOC received:  Date authorization started with reference number:  Date authorization received and expires:  Accepting facility:  Follow up appointments: PCP  DME needed: None  Transportation at Discharge: Pt will need medical transport at d/c.   [employer]an Lares or means to access home:    Pt has access    IM Medicare Letter: 2nd IM Letter needed at d/c. Is patient a [place] and connected with the [place]? No            If yes, was [employer] transfer form completed and VA notified? Caregiver Contact: Chery Perez- Brother [phone]  Discharge Caregiver contacted prior to discharge? left VM on 1/23/23  Care Conference needed?:  No    Chart reviewed. Acknowledged CM consult. Pt is medically stable for d/c. CM placed call to [facility] where pt typically resides. Spoke with Carmen Givens ([phone]) in regards to pt's return to facility. Per Yfn Niño, he has not yet received any clinicals and will need to complete a bedside assessment this afternoon and short term UAI to identify if pt is appropriate to return vs going to SNF at discharge. CM has faxed clinicals for review to [facility] (f) [phone] this AM. Confirmation received. Pending response. Per Yfn Niño, it is not likely pt will be able to return to facility today until the above is completed. CM placed call to pt's brother, Matt Ferguson, to discuss discharge planning. Per Yfn Percli, pt's family had been \"pressing for SNF placement\", however CM unaware of this at this time. Left VM and awaiting response from brother. Pt is wheelchair bound at baseline and facility assists with ADLs and transfers. DEV has [facility] following bedside assessment today. Will continue to follow.     VANIA Cherry   Care Manager, [address] [place] [address]

## 2023-01-23 NOTE — PROGRESS NOTES
Problem: Self Care Deficits Care Plan (Adult)  Goal: *Acute Goals and Plan of Care (Insert Text)  Description: FUNCTIONAL STATUS PRIOR TO ADMISSION: non ambulatory/wheelchair bound, reports stand pivoting with assist to and from surfaces and arm rest is removed from wheelchair pt to stand and turn. Propels wheelchair with feet. Reaches for grab bar or arm rests to transfer, performed UB ADLs with increased time and set up, max/total assist LB ADLS, able to stand holding onto grab bar for staff to manage pants up and down and assist with LB ADLS, at times can partially assist with pants down or up, unable to don socks/shoes    HOME SUPPORT PRIOR TO ADMISSION: The patient lived at [facility] getting assist from staff. Occupational Therapy Goals: Outcome: Not Met   OCCUPATIONAL THERAPY EVALUATION  Patient: Eduardo Moses (89 y.o. female)  Date: 1/23/2023  Primary Diagnosis: Acute metabolic encephalopathy [X99.62]       Precautions: fall       ASSESSMENT  Based on the objective data described below, the patient presents with mild memory deficits and general weakness. Pt was supine upon arrival and willing to participate. She had difficulty with remembering if she had a hospital bed. Pt is wheelchair bound and needs assist with transfers and ADLS at baseline. Pt lives at [facility]. BUE are functional but pt has decreased shoulder ROM past 90 degrees. She was able to bring LE to edge of bed with verbal cues and increased time. Moderate assist needed for upper trunk with total assist to scoot to edge of bed. Good balance dynamically seated. Pt needs assist with LB ADLS at baseline. Multiple attempt made at sit to stand with pulling on bar on back of recliner and with therapist in front of pt. Unable to achieve sit to stand or clear buttocks. Recommend zbigniew at this time. I am unsure how much assist pt can get at long-term.   If long-term cannot provided the level of assist pt currently needs recommend short term SNF for rehab.     Current Level of Function Impacting Discharge (ADLs/self-care): moderate to max assist bed mobility, unable to stand or transfer    Feeding: Setup    Oral Facial Hygiene/Grooming: Setup    Bathing: Maximum assistance    Upper Body Dressing: Moderate assistance    Lower Body Dressing: Total assistance    Toileting: Total assistance    Functional Outcome Measure: The patient scored 20/100 on the barthel outcome measure which is indicative of significant decline in mobility and ADLS. Other factors to consider for discharge: needs zbigniew lift at this time for transfers     Patient will benefit from skilled therapy intervention to address the above noted impairments. PLAN :  Recommendations and Planned Interventions: self care training, functional mobility training, therapeutic exercise, balance training, therapeutic activities, patient education, home safety training, and family training/education    Frequency/Duration: Patient will be followed by occupational therapy 3 times a week to address goals. Recommendation for discharge: (in order for the patient to meet his/her long term goals)  Therapy up to 5 days/week in SNF setting vs. Back to [facility] with assist with mobility and ADLS    This discharge recommendation:  Has not yet been discussed the attending provider and/or case management    IF patient discharges home will need the following DME: zbigniew lift       SUBJECTIVE:   Patient stated I get help.     OBJECTIVE DATA SUMMARY:   HISTORY:   Past Medical History:   Diagnosis Date    Abnormal cardiovascular stress test 04/18/08    Dr. Parekh Erlin    Allergy, unspecified not elsewhere classified     Bell's palsy 02/2005    Left. Dr. Emanuel Rawls    CAD (coronary artery disease)     Dr. Vasquez Adler. Dr. Domonique Castelan    Chest pain 2002    Dr. Andrew Durbin    Chronic headache 2013    due to depression. Dr. Jorge Clemons.     Chronic pain     LEGS AND FEET; BACK UNDER RIGHT SHOULDER    Congenital eye disorder     Lazy eyes. Dr. Randy Mcneil    Dementia [facility])     Dr. Akira Chan. Depression 2011    Dr. Levar Montague (psychologist). Dr. Tad Junior. Dr. Yakov Jasmine. DJD (degenerative joint disease)     knees, low back. Dr. Janet Weiner. Dr. Cornel Pritchard. Dr. Randy Hines    Gait instability 02/10/09    Dr. Lenny Syed. Dr. Mishra No. Gallstones     GERD (gastroesophageal reflux disease)     Hiatal hernia 1977    Hypercholesteremia     Hypertension     Ill-defined condition     \"JUST ONE NIGHT I WENT TO STAND UP AND I JUST COULDN'T STAND\"  PT NOT SURE WHY    Leg weakness 12/2009    Dr. Akira Chan. Dr. Jose Conley. Lower extremity venous stasis     Dr. Vivian Ramesh    Lumbar spinal stenosis 12/2009    L3-5. Dr. Mague Roberson. PARAPLEGIA SINCE 2012    Lymphedema 12/19/08    Dr. Tu Oconnell then Dr. Erasmo Rodriguez    Memory loss 2013    Dr. Sony Aldana. Menopause     Migraine     Ocular. Dr. Marcos Rao    NORMA (obstructive sleep apnea) 01/24/07    Dr. Marianna Randall. NO CPAP    Osteoporosis 02/10/09    Dr. Yelena Hebert    Pancreatitis 03/2011    Post-menopausal bleeding 07/2016    due to Dr. Leita Gaucher, Jr.    Rotator cuff injury     Left. Dr. Cleopatra Soto; chronic bilateral    Seborrheic keratosis 2005    Dr. Diann Romero 02/05/09    Dr. Kaylin Dimas    Tremor 2013    Dr. Mishra No. Unspecified vitamin D deficiency 07/2009    Urinary incontinence 2013    Dr. IqbalJosi Reasons    Venous insufficiency 1998    R>L. with chronic leg edema. Dr. Erasmo Rodriguez     Past Surgical History:   Procedure Laterality Date    HX HEART CATHETERIZATION  06/2008    Dr. Josr Petersen. due to abnormal Stress Test    HX MAR AND BSO  09/14/2016    Dr. Rosalva Linda Jr     HYSTEROSCOPIC MYOMECTOMY  07/27/2016    D AND C WITH SYMPHION, RESECTION OF ENDOMETRIAL TISSUE. benign. due to postmenopausal bleeding.   Dr. Leita Gaucher, [facility] Griffiths.       Expanded or extensive additional review of patient history:     Home Situation  [address] [facility] [facility] [address] Name: [place]  One/Two Story Residence: One story  Living Alone: No  Support Systems: Other Family Member(s) (Pt resides at [employer] [facility]  Patient Expects to be Discharged to[de-identified] Group home  Current DME Used/Available at Home: Commode, bedside, Grab bars, Wheelchair, Shower chair, Hospital bed    Hand dominance: Right    EXAMINATION OF PERFORMANCE DEFICITS:  Cognitive/Behavioral Status:  Neurologic State: Alert  Orientation Level: Oriented X4  Cognition: Follows commands  Perception: Appears intact (sitting, unable to achieve standing this session)  Perseveration: No perseveration noted  Safety/Judgement: Fall prevention;Home safety; Insight into deficits    Vision/Perceptual:                                Corrective Lenses: Glasses    Range of Motion:    AROM: Generally decreased, functional  PROM: Generally decreased, functional                      Strength:    Strength: Generally decreased, functional                Coordination:  Coordination: Within functional limits  Fine Motor Skills-Upper: Left Intact; Right Intact    Gross Motor Skills-Upper: Left Intact; Right Intact (within available ROM of shoulders)    Tone & Sensation:    Tone: Normal  Sensation: Intact                      Balance:  Sitting - Static: Good (unsupported)  Sitting - Dynamic: Good (unsupported)  Standing: Impaired (attempted x3 but unable to achieve sit to stand)    Functional Mobility and Transfers for ADLs:  Bed Mobility:  Rolling: Minimum assistance  Supine to Sit: Moderate assistance (upper trunk, able to manage BLE to edge of bed with increased time)  Sit to Supine: Maximum assistance  Scooting:  Total assistance    Transfers:  Sit to Stand:  (attempted x3 trials with a variety of methods, unable)  Bed to Chair:  (needs zbigniew at this time)  Bathroom Mobility: (unable)    ADL Assessment:  Feeding: Setup    Oral Facial Hygiene/Grooming: Setup    Bathing: Maximum assistance    Upper Body Dressing: Moderate assistance    Lower Body Dressing: Total assistance    Toileting: Total assistance                  ADL Intervention and task modifications:     See assessment    Cognitive Retraining  Safety/Judgement: Fall prevention;Home safety; Insight into deficits      Functional Measure:    Barthel Index:  Bathin  Bladder: 0  Bowels: 10  Groomin  Dressin  Feeding: 10  Mobility: 0  Stairs: 0  Toilet Use: 0  Transfer (Bed to Chair and Back): 0  Total: 20/100      The Barthel ADL Index: Guidelines  1. The index should be used as a record of what a patient does, not as a record of what a patient could do. 2. The main aim is to establish degree of independence from any help, physical or verbal, however minor and for whatever reason. 3. The need for supervision renders the patient not independent. 4. A patient's performance should be established using the best available evidence. Asking the patient, friends/relatives and nurses are the usual sources, but direct observation and common sense are also important. However direct testing is not needed. 5. Usually the patient's performance over the preceding 24-48 hours is important, but occasionally longer periods will be relevant. 6. Middle categories imply that the patient supplies over 50 per cent of the effort. 7. Use of aids to be independent is allowed. Score Interpretation (from [address] [place] [address])    Independent   60-79 Minimally independent   40-59 Partially dependent   20-39 Very dependent   <20 Totally dependent     -Juliann Hou., Barthel, D.W. (1965). Functional evaluation: the Barthel Index. [address] [facility] ([address] (1997). The Barthel activities of daily living index: self-reporting versus actual performance in the old (> or = 75 years). Journal of [address] [place] [address] 45(7), 392-184. INGRIS Aragon, oJana Hawkins., Chloé Pierce., [facility]. (1999). Measuring the change in disability after inpatient rehabilitation; comparison of the responsiveness of the Barthel Index and Functional [place] Measure. Journal of Neurology, Neurosurgery, and Psychiatry, 66(4), 849-846. ANALISA Cook, TOMAS Mccurdy, & Avila Brink M.A. (2004) Assessment of post-stroke quality of life in cost-effectiveness studies: The usefulness of the Barthel Index and the EuroQoL-5D. Quality of Life Research, 15, 758-89         Occupational Therapy Evaluation Charge Determination   History Examination Decision-Making   LOW Complexity : Brief history review  LOW Complexity : 1-3 performance deficits relating to physical, cognitive , or psychosocial skils that result in activity limitations and / or participation restrictions  LOW Complexity : No comorbidities that affect functional and no verbal or physical assistance needed to complete eval tasks       Based on the above components, the patient evaluation is determined to be of the following complexity level: LOW   Pain Ratin/10    Activity Tolerance:   Fair    After treatment patient left in no apparent distress:    Supine in bed, Heels elevated for pressure relief, Call bell within reach, Bed / chair alarm activated, and Side rails x 3    COMMUNICATION/EDUCATION:   The patients plan of care was discussed with: Registered nurse and patient . Patient/family have participated as able in goal setting and plan of care. and Patient/family agree to work toward stated goals and plan of care. This patients plan of care is appropriate for delegation to [facility].     Thank you for this referral.  SHAHRIAR Brooks/L  Time Calculation: 19 mins

## 2023-01-23 NOTE — PROGRESS NOTES
Bumex corrected  Pharmacist Discharge Medication Reconciliation      Significant PMH:   Past Medical History:   Diagnosis Date    Abnormal cardiovascular stress test 04/18/08    Dr. Wayne Echavarria    Allergy, unspecified not elsewhere classified     Bell's palsy 02/2005    Left. Dr. Vidya Dial    CAD (coronary artery disease)     Dr. Edi Caro. Dr. Emelina Sinha    Chest pain 2002    Dr. Carol Tran    Chronic headache 2013    due to depression. Dr. Christine Aleman. Chronic pain     LEGS AND FEET; BACK UNDER RIGHT SHOULDER    Congenital eye disorder     Lazy eyes. Dr. Kennedy Tate    Dementia [facility])     Dr. Leslie Anderson. Depression 2011    Dr. Jordan Shrestha (psychologist). Dr. Christine Aleman. Dr. Chepe Virk. DJD (degenerative joint disease)     knees, low back. Dr. Rian Serna. Dr. Ramiro Cortez. Dr. Karla Blancas    Gait instability 02/10/09    Dr. Fransico Fish. Dr. Christine Aleman. Gallstones     GERD (gastroesophageal reflux disease)     Hiatal hernia 1977    Hypercholesteremia     Hypertension     Ill-defined condition     \"JUST ONE NIGHT I WENT TO STAND UP AND I JUST COULDN'T STAND\"  PT NOT SURE WHY    Leg weakness 12/2009    Dr. Leslie Anderson. Dr. Radha Muir. Lower extremity venous stasis     Dr. Shirley Sanderson    Lumbar spinal stenosis 12/2009    L3-5. Dr. Lencho Burrows. PARAPLEGIA SINCE 2012    Lymphedema 12/19/08    Dr. Amarilis Ba then Dr. Herring Sequin    Memory loss 2013    Dr. Kristie Davalos. Menopause     Migraine     Ocular. Dr. Hood Merritt    NORMA (obstructive sleep apnea) 01/24/07    Dr. Amber Alonso. NO CPAP    Osteoporosis 02/10/09    Dr. Chava Castellon    Pancreatitis 03/2011    Post-menopausal bleeding 07/2016    due to Dr. Nino Pereira Jr.    Rotator cuff injury     Left.   Dr. Alina Mendoza; chronic bilateral    Seborrheic keratosis 2005    Dr. Eliceo Sin 02/05/09    Dr. Boaz Felder    Tremor 2013 Dr. Maximino Guevara. Unspecified vitamin D deficiency 07/2009    Urinary incontinence 2013    Dr. Beverly Hodge    Venous insufficiency 1998    R>L. with chronic leg edema. Dr. Tramaine Cross     Encounter Diagnoses:   Encounter Diagnoses   Name Primary? Acute cystitis without hematuria Yes    PAMELA (acute kidney injury) ([facility] [address]      Allergies: Aristocort [triamcinolone], Codeine, Lasix [furosemide], Bupropion, Darvocet a500 [propoxyphene n-acetaminophen], Influenza virus vaccine whole, Mevacor [lovastatin], Percocet [oxycodone-acetaminophen], Rhinocort, and Simvastatin    Admission date: 1/20/2023     Discharge Medications:   Current Discharge Medication List        START taking these medications    Details   cephALEXin (Keflex) 500 mg capsule Take 1 Capsule by mouth four (4) times daily for 5 days. Qty: 20 Capsule, Refills: 0  Start date: 1/22/2023, End date: 1/27/2023           CONTINUE these medications which have CHANGED    Details   bumetanide (BUMEX) 1 mg tablet Take 1 Tablet by mouth daily as needed for PRN Reason (Other) (fluid retention, leg swelling). Qty: 30 Tablet, Refills: 1  Start date: 1/23/2023           CONTINUE these medications which have NOT CHANGED    Details   !! acetaminophen (TYLENOL) 500 mg tablet Take 1,000 mg by mouth three (3) times daily. fenofibric acid (TRILIPIX ER) 45 mg capsule Take 45 mg by mouth daily. !! acetaminophen (TYLENOL) 325 mg tablet Take 650 mg by mouth every six (6) hours as needed for Pain.      emollient combination no. 114 (Eucerin Advanced Repair) crea Apply 1 Each to affected area as needed (skin dryness). Cetaphil topical cream Apply 1 Each to affected area as needed for Dry Skin. diclofenac (VOLTAREN) 1 % gel Apply 2 g to affected area three (3) times daily as needed for Pain (to both knees). ezetimibe (ZETIA) 10 mg tablet Take 10 mg by mouth.       ondansetron (ZOFRAN ODT) 8 mg disintegrating tablet Take 8 mg by mouth every eight (8) hours as needed. cholecalciferol (VITAMIN D3) 25 mcg (1,000 unit) cap Take 1,000 Units by mouth daily. codeine-butalbital-aspirin-caffeine (FIORINAL WITH CODEINE) 16--40 mg per capsule Take  by mouth every six (6) hours as needed for Pain. sertraline (ZOLOFT) 50 mg tablet Take 50 mg by mouth daily. pantoprazole (PROTONIX) 40 mg tablet Take 40 mg by mouth two (2) times a day. mirtazapine (REMERON SOL-TAB) 15 mg disintegrating tablet Take 7.5 mg by mouth. aspirin 81 mg chewable tablet Take 1 Tab by mouth daily. Indications: prevention of cerebrovascular accident  Qty: 90 Tab, Refills: 3       !! - Potential duplicate medications found. Please discuss with provider. STOP taking these medications       potassium chloride SR (KLOR-CON 10) 10 mEq tablet Comments:   Reason for Stopping:         amlodipine (NORVASC) 2.5 mg tablet Comments:   Reason for Stopping:         lisinopril (PRINIVIL, ZESTRIL) 5 mg tablet Comments:   Reason for Stopping:               The patient's chart, MAR and AVS were reviewed by Brenda Vargas [facility].     Discharging Provider: Alie Figueroa MD     Thank you,     Brenda Vargas, [facility]

## 2023-01-24 VITALS
HEART RATE: 71 BPM | RESPIRATION RATE: 19 BRPM | HEIGHT: 60 IN | DIASTOLIC BLOOD PRESSURE: 64 MMHG | OXYGEN SATURATION: 99 % | TEMPERATURE: 98.6 F | WEIGHT: 183.42 LBS | BODY MASS INDEX: 36.01 KG/M2 | SYSTOLIC BLOOD PRESSURE: 111 MMHG

## 2023-01-24 PROCEDURE — 74011250636 HC RX REV CODE- 250/636: Performed by: INTERNAL MEDICINE

## 2023-01-24 PROCEDURE — 74011000250 HC RX REV CODE- 250: Performed by: INTERNAL MEDICINE

## 2023-01-24 PROCEDURE — 94760 N-INVAS EAR/PLS OXIMETRY 1: CPT

## 2023-01-24 PROCEDURE — 74011250637 HC RX REV CODE- 250/637: Performed by: INTERNAL MEDICINE

## 2023-01-24 RX ADMIN — ASPIRIN 81 MG: 81 TABLET, CHEWABLE ORAL at 08:58

## 2023-01-24 RX ADMIN — POLYETHYLENE GLYCOL 3350 17 G: 17 POWDER, FOR SOLUTION ORAL at 08:57

## 2023-01-24 RX ADMIN — SODIUM CHLORIDE, PRESERVATIVE FREE 10 ML: 5 INJECTION INTRAVENOUS at 05:25

## 2023-01-24 RX ADMIN — SERTRALINE 50 MG: 50 TABLET, FILM COATED ORAL at 08:58

## 2023-01-24 RX ADMIN — PANTOPRAZOLE SODIUM 40 MG: 40 TABLET, DELAYED RELEASE ORAL at 08:58

## 2023-01-24 RX ADMIN — ENOXAPARIN SODIUM 40 MG: 100 INJECTION SUBCUTANEOUS at 08:59

## 2023-01-24 RX ADMIN — EZETIMIBE 10 MG: 10 TABLET ORAL at 08:58

## 2023-01-24 NOTE — DISCHARGE SUMMARY
Hospitalist Discharge Summary     Patient ID:  Tessa Rogers  [id number]  86 y.o.  1940 1/20/2023    PCP on record: Adithya Bowles DO    Admit date: 1/20/2023  Discharge date and time: 1/24/2023    DISCHARGE DIAGNOSIS:  As below     CONSULTATIONS:  IP CONSULT TO VASCULAR SURGERY    Excerpted HPI from H&P of Murali Velasquez MD:  CHIEF COMPLAINT: \" My right calf and foot have been aching overnight\"     HISTORY OF PRESENT ILLNESS:     78year-old female     Reported history of cognitive impairment  Essential hypertension, hyperlipidemia, coronary artery disease     Denies a prior history of PAD     Resident of assisted living  Baseline is wheelchair-bound, but uses her legs to propel her self around     Some discomfort in her right foot and calf and lower thigh yesterday evening  Increasing aching pain this a.m. moderate  Pain just came \"on its own\"  Pain mamta severe in her right ankle and right foot  No weakness or numbness in the right foot  No trauma to the leg  No fevers, headache, sore throat, shortness of breath, chest pain  Chronic cough \"from allergies\"  No nausea vomiting, diarrhea, abdominal pain  No dysuria  Came to the emergency room because of the right foot and leg pain     Emergency room  Borderline low blood pressure 93/58  Received a 30 mL/kg bolus = 3.945 liters  Abnormal UA > 100 WBC, 0-5 RBCs, 2 + bacteria  No SIRS criteria  BUN/creat 31 and 1.36  Last baseline creat 0.8 to 0.96 in 2018  Chest x-ray with no airspace disease  Right hip pelvis x-ray with no fractures, + DJD   Could not clearly palpate dorsalis pedis or posterior tibial pulses bilaterally              2+ femoral pulses bilaterally  Doppler ultrasound not able to detect right dorsalis pedis, posterior tibials pulses  Able detect a left dorsalis pedis, left posterior tibial very faint  Able to flex toes and feet vs resistance in both feet  No numbness in feet  IV ceftriaxone  Blood and urine cultures were sent  ____________________________________________________________________  DISCHARGE SUMMARY/HOSPITAL COURSE:  for full details see H&P, daily progress notes, labs, consult notes. Right foot and calf pain POA  Diminished right dorsalis pedis, posterior tibial pulses POA  Probable PAD POA  Came to the emergency room because her right foot and ankle has been hurting overnight  No known PAD  No trauma to the leg  No clear skin changes, right middle toe bandaged  Could not clearly palpate dorsalis pedis or posterior tibial pulses bilaterally              2+ femoral pulses bilaterally  Doppler ultrasound not able to detect right dorsalis pedis, posterior tibials pulses  Able detect a left dorsalis pedis, left posterior tibial very faint  Able to flex toes and feet vs resistance in both feet  No numbness in feet  Mild pain on motion of ankle and knee  Differential              Ischemic foot pain              Fracture less likely              ? DVT              No clear evidence of cellulitis              ? Radicular pain, denies back pain              ? OA  Vascular surgery consulted, Limb not threatened, CTA shows chronic multilevel PAD, No indication for acute intervention, will restart diet, f/up with Vascular as outpt  Leg pain resolved completely on day of DC  LE dopplers neg for DVT  Right ankle and tib-fib x-rays neg for fracture/dislocation  Abdominal aortic CTA with LE runoff Focus of severe stenosis in the distal one third of the SFA on the right. Moderate to severe stenosis distal one third of the superficial femoral artery on the left. No definite in-line flow to the malleoli is demonstrated. Multifocal stenoses of the infrapopliteal vessels is suggested. Possible UTI POA  Abnormal UA > 100 WBC, 0-5 RBCs, 2 + bacteria  No SIRS criteria  No urinary symptoms  Blood Cx grew CNS, likely contaminant, repeat Cx neg  urine cultures grew E Coli   Rocephin while inpt, switch to Keflex on DC     ?  Mild acute kidney injury POA BUN/creat 31 and 1.36  Last baseline creat 0.8 to 0.96 in 2018  No more recent labs  S/p several liter IVF bolus in ED  Renal function improving      Essential HTN POA  Hyperlipidemia POA  CAD POA  Relative hypotension 93/58 in ED POA  Hold norvasc and lisinopril.  Switch Bumex to daily PRN  S/p IVF  Continue zetia, fenofibrate, ASA  PRN hydralazine     Mild cognitive impairment POA  Current alert and oriented X3, appropriate  Watch for delirium  Continue remeron     GERD  Continue PPI      All Micro Results       Procedure Component Value Units Date/Time    CULTURE, BLOOD, PAIRED [[id number]] Collected: 01/22/23 0440    Order Status: Completed Specimen: Blood Updated: 01/23/23 0739     Special Requests: NO SPECIAL REQUESTS        Culture result: NO GROWTH 1 DAY       CULTURE, BLOOD [[id number]] Collected: 01/20/23 1529    Order Status: Completed Specimen: Blood Updated: 01/23/23 0739     Special Requests: NO SPECIAL REQUESTS        Culture result: NO GROWTH 3 DAYS       CULTURE, URINE [[id number]]  (Abnormal)  (Susceptibility) Collected: 01/20/23 1544    Order Status: Completed Specimen: Urine Updated: 01/22/23 1458     Special Requests: --        NO SPECIAL REQUESTS  Reflexed from [id number]       [place] Count --        >100,000  COLONIES/mL       Culture result: Escherichia coli       CULTURE, BLOOD [[id number]]  (Abnormal) Collected: 01/20/23 1400    Order Status: Completed Specimen: Blood Updated: 01/22/23 0938     Special Requests: NO SPECIAL REQUESTS        Culture result:       STAPHYLOCOCCUS SPECIES, COAGULASE NEGATIVE GROWING IN 1 OF 2 BOTTLES DRAWN SITE = LAC                  REMAINING BOTTLE(S) HAS/HAVE NO GROWTH SO FAR          BLOOD CULTURE ID PANEL [[id number]]  (Abnormal) Collected: 01/20/23 1400    Order Status: Completed Specimen: Blood Updated: 01/21/23 1639     Acc. no. from Micro Order [id number]     Enterococcus faecalis Not detected        Enterococcus faecium Not detected Listeria monocytogenes Not detected        Staphylococcus Detected        Staphylococcus aureus Not detected        Staph epidermidis Not detected        Staph lugdunensis Not detected        Streptococcus Not detected        Streptococcus agalactiae (Group B) Not detected        Streptococcus pneumoniae Not detected        Streptococcus pyogenes (Group A) Not detected        Acinetobacter calcoaceticus-baumanii complex Not detected        Bacteroides fragilis Not detected        Enterobacterales species Not detected        Enterobacter cloacae complex Not detected        Escherichia coli Not detected        Klebsiella aerogenes Not detected        Klebsiella oxytoca Not detected        Klebsiella pneumoniae group Not detected        Proteus Not detected        Salmonella Not detected        Serratia marcescens Not detected        Haemophilus influenzae Not detected        Neisseria meningitidis Not detected        Pseudomonas aeruginosa Not detected        Steno maltophilia Not detected        Candida albicans Not detected        Candida auris Not detected        Candida glabrata Not detected        Candida krusei Not detected        Candida parapsilosis Not detected        Candida tropicalis Not detected        Crypto neoformans/gattii Not detected        RESISTANT GENES:            Comment       False positive results may rarely occur.  Correlate with clinical,epidemiologic, and other laboratory findings           Comment: Please see BCID Interpretation Guide in [place] [[id number]] Collected: 01/20/23 1401    Order Status: No result Updated: 01/21/23 0953    COVID-19 RAPID TEST [[id number]] Collected: 01/20/23 1401    Order Status: Completed Specimen: Nasopharyngeal Updated: 01/20/23 1446     Specimen source Nasopharyngeal        COVID-19 rapid test Not detected        Comment: Rapid Abbott ID Now       Rapid NAAT:  The specimen is NEGATIVE for SARS-CoV-2, the novel coronavirus associated with COVID-19. Negative results should be treated as presumptive and, if inconsistent with clinical signs and symptoms or necessary for patient management, should be tested with an alternative molecular assay. Negative results do not preclude SARS-CoV-2 infection and should not be used as the sole basis for patient management decisions. This test has been authorized by the FDA under an Emergency Use Authorization (EUA) for use by authorized laboratories. Fact sheet for Healthcare Providers:  http://www.christofer.margret/  Fact sheet for Patients: http://www.christofer.margret/       Methodology: Isothermal Nucleic Acid Amplification                  _______________________________________________________________________  Patient seen and examined by me on discharge day. POC d/w pt, all question/concerns addressed and pt verbalized understanding and agree with the plan above. Pertinent Findings:  Gen:    Not in distress  Chest: Clear lungs  CVS:   Regular rhythm. No edema  Abd/: Soft, not distended, not tender  Neuro:  Alert, oriented X3  _______________________________________________________________________  DISCHARGE MEDICATIONS:   Current Discharge Medication List        START taking these medications    Details   cephALEXin (Keflex) 500 mg capsule Take 1 Capsule by mouth four (4) times daily for 5 days. Qty: 20 Capsule, Refills: 0  Start date: 1/22/2023, End date: 1/27/2023           CONTINUE these medications which have CHANGED    Details   bumetanide (BUMEX) 1 mg tablet Take 1 Tablet by mouth daily as needed for PRN Reason (Other) (fluid retention, leg swelling). Qty: 30 Tablet, Refills: 1  Start date: 1/23/2023           CONTINUE these medications which have NOT CHANGED    Details   !! acetaminophen (TYLENOL) 500 mg tablet Take 1,000 mg by mouth three (3) times daily. fenofibric acid (TRILIPIX ER) 45 mg capsule Take 45 mg by mouth daily.       !! acetaminophen (TYLENOL) 325 mg tablet Take 650 mg by mouth every six (6) hours as needed for Pain.      emollient combination no. 114 (Eucerin Advanced Repair) crea Apply 1 Each to affected area as needed (skin dryness). Cetaphil topical cream Apply 1 Each to affected area as needed for Dry Skin. diclofenac (VOLTAREN) 1 % gel Apply 2 g to affected area three (3) times daily as needed for Pain (to both knees). ezetimibe (ZETIA) 10 mg tablet Take 10 mg by mouth. ondansetron (ZOFRAN ODT) 8 mg disintegrating tablet Take 8 mg by mouth every eight (8) hours as needed. cholecalciferol (VITAMIN D3) 25 mcg (1,000 unit) cap Take 1,000 Units by mouth daily. codeine-butalbital-aspirin-caffeine (FIORINAL WITH CODEINE) 16--40 mg per capsule Take  by mouth every six (6) hours as needed for Pain. sertraline (ZOLOFT) 50 mg tablet Take 50 mg by mouth daily. pantoprazole (PROTONIX) 40 mg tablet Take 40 mg by mouth two (2) times a day. mirtazapine (REMERON SOL-TAB) 15 mg disintegrating tablet Take 7.5 mg by mouth. aspirin 81 mg chewable tablet Take 1 Tab by mouth daily. Indications: prevention of cerebrovascular accident  Qty: 90 Tab, Refills: 3       !! - Potential duplicate medications found. Please discuss with provider. STOP taking these medications       potassium chloride SR (KLOR-CON 10) 10 mEq tablet Comments:   Reason for Stopping:         amlodipine (NORVASC) 2.5 mg tablet Comments:   Reason for Stopping:         lisinopril (PRINIVIL, ZESTRIL) 5 mg tablet Comments:   Reason for Stopping:                 Patient Follow Up Instructions: Activity: Activity as tolerated  Diet: ADULT DIET Regular  Wound Care: None needed  Follow-up with PCP in  1 week. Follow-up tests/labs DALIA as outpt   If you have any concerns that you feel you need to come back to the hospital, please do not hesitate.     Follow-up Information       Follow up With Specialties Details Why Contact Info Lily Conte DO Family Medicine, Bariatrics Follow up in 1 week(s)      Gustavo Randolph MD Vascular Surgery Schedule an appointment as soon as possible for a visit in 1 week(s)  [address]  [address] [place] [address]      Gustavo Randolph MD Vascular Surgery Follow up in 2 week(s)  [address]  [phone]            ________________________________________________________________    Risk of deterioration: Moderate    Condition at Discharge:  Stable  __________________________________________________________________    Disposition  Home with family and home health services    ____________________________________________________________________    Code Status: Full Code  ___________________________________________________________________      Total time in minutes spent coordinating this discharge (includes going over instructions, follow-up, prescriptions, and preparing report for sign off to her PCP) :  38 minutes    Signed:  Conrado Delong MD

## 2023-01-24 NOTE — PROGRESS NOTES
Transition of Care Plan:     RUR: 12%  [facility]  If SNF or IPR: Date FOC offered:  Date FOC received:  Date authorization started with reference number:  Date authorization received and expires:  Accepting facility:  Follow up appointments: PCP  DME needed: None  Transportation at Discharge: Hospital to Home, ETA 1:30PM   [place] or means to access home:    Pt has access    IM Medicare Letter: given on 1/23/23   Is patient a  and connected with the [employer] ? No            If yes, was [employer] transfer form completed and [facility] notified? Caregiver Contact: Shea Bridges- Brother [phone]  Discharge Caregiver contacted prior to discharge? yes  Care Conference needed?:  No    Chart reviewed. CM aware of discharge order. Bedside assessment completed yesterday evening by [facility] staff Amanad Shafer). CM spoke with Pio Doshi this AM and confirmed that pt is able to return to the [facility] upon discharge. Pio Doshi requested this CM to complete UAI/LTSS, however after speaking with pt's brother, pt does not have active Medicaid and has not yet applied for Medicaid. CM unable to complete LTSS at this time. Pio Doshi verbalized understanding. Pt able to return to facility any time today. RN to call report to facility (p) [phone] ask for Sharyn Dexter. CM faxed discharge summary to facility for review. [phone]. CM spoke with pt's brother, Chanel Foley, to review d/c plan. He verbalized understanding and is agreeable to d/c plan. He endorses that [facility] has been discussing a possible transfer to SNF/LTC at [facility] in the near future due to decline in pt's function. He was supposed to meet with , Pio Doshi, yesterday but pt remained hospitalized. Pt's brother, Chanel Foley, will  any discharge medications from [facility] (2 medications were called in) and take to [facility] upon d/c today today. IMM signed on 1/23/23.  CM secured Hospital to Home transport, ETA is 1:30PM via stretcher. Informed assigned RN. Met with pt at bedside to review the above. She verbalized understanding and is agreeable to d/c plan. No CM barriers identified. Ready for d/c from CM standpoint. Care Management Interventions  PCP Verified by CM:  Yes  Palliative Care Criteria Met (RRAT>21 & CHF Dx)?: No  Mode of Transport at Discharge: [facility] to Home )  Hospital Transport Time of Discharge: 1330  Transition of [address] [place] [address] Road (CM Consult): Discharge Planning  Discharge Durable Medical Equipment: No  Physical Therapy Consult: Yes  Occupational Therapy Consult: Yes  Speech Therapy Consult: No  Support Systems: Assisted Living, Other Family Member(s)  Confirm Follow Up Transport: [facility]  The Patient and/or Patient Representative was Provided with a Choice of Provider and Agrees with the Discharge Plan?: Yes  Name of the Patient Representative Who was Provided with a Choice of Provider and Agrees with the Discharge Plan: Radhames Morales (brother)  Freedom of Choice List was Provided with Basic Dialogue that Supports the Patient's Individualized Plan of Care/Goals, Treatment Preferences and Shares the Quality Data Associated with the Providers?: Yes   Resource Information Provided?: No  Discharge Location  Patient Expects to be Discharged to[de-identified] Assisted Living    VANIA White   Care Manager, [address] [address] [facility]  [phone]

## 2023-01-24 NOTE — PROGRESS NOTES
Patient discharged with hospital to home. Report given to [facility]. IV removed. Meds given. Instructions faxed and went with facility.

## 2023-01-24 NOTE — PROGRESS NOTES
1908: Jazlyn Clark came to see patient this evening, he stated he was advised the patient was at [facility], as he would have been by to clear the patient to return to [facility]. Per Mark Agarwal he is able to accept the patient and asked that an UAI be completed for the patient.

## 2023-01-25 LAB
LEFT ARM BP: 140 MMHG
LEFT POSTERIOR TIBIAL: >240 MMHG
LEFT TBI: 0.51
LEFT TOE PRESSURE: 75 MMHG
RIGHT ARM BP: 146 MMHG
RIGHT POSTERIOR TIBIAL: >240 MMHG
RIGHT TBI: 0.27
RIGHT TOE PRESSURE: 39 MMHG
VAS LEFT DORSALIS PEDIS BP: >240 MMHG
VAS RIGHT DORSALIS PEDIS BP: >240 MMHG

## 2023-01-26 LAB
BACTERIA SPEC CULT: ABNORMAL
BACTERIA SPEC CULT: ABNORMAL
BACTERIA SPEC CULT: NORMAL
SERVICE CMNT-IMP: ABNORMAL
SERVICE CMNT-IMP: NORMAL

## 2023-01-28 LAB
BACTERIA SPEC CULT: NORMAL
SERVICE CMNT-IMP: NORMAL

## 2023-02-02 NOTE — PROGRESS NOTES
Physician Progress Note      PATIENT:               Sukumar Crocker  CSN #:                  [id number]  :                       1940  ADMIT DATE:       2023 12:57 PM  [address] [place] DATE:        2023 1:25 PM  RESPONDING  PROVIDER #:        Janice Sandra MD          QUERY TEXT:    Pt admitted with Right foot and calf pain with diminished right dorsalis pedis, posterior tibial pulses noted on H&P. Please document in progress notes and discharge summary the etiology of the Rt leg pain    The medical record reflects the following:    Risk Factors: Reported history of cognitive impairment. Resident of assisted living; Baseline is wheelchair-bound, but uses her legs to propel her self around    Clinical Indicators:  -Right leg is shortened and externally rotated. Pain just came \"on its own\" (H&P)  -XR HIP RT W OR WO PELV 2-3 VWS - No acute abnormality  -XR TIB/FIB RT: No acute fracture or dislocation. Peripheral arterial disease and dystrophic calcifications  -CT Abd Impression:  Peripheral arterial disease. Focus of severe stenosis in the distal one third of the SFA on the right. Moderate to severe stenosis distal one third of the superficial femoral artery  on the left. No definite in-line flow to the malleoli is demonstrated. -XR Rt ankle ap/lat: chronic fractures of the distal tibia and fibula. Diffuse soft tissue swelling osteopenia with no definite acute process. -Doppler No evidence of DVT     Vascular pn: Right foot/calf pain that has improved, unclear whether this is rest pain. Pain is improved. Neruovascular intact and foot warm. Limb not threatened  CTA shows chronic multilevel PAD  No indication for acute intervention    Treatment: XR, CT, Doppler, Vascular consult  Options provided:  -- Right foot and calf pain due to PAD  -- Right foot and calf pain due to, Please document etiology.   -- Other - I will add my own diagnosis  -- Disagree - Not applicable / Not valid  -- Disagree - Clinically unable to determine / Unknown  -- Refer to Clinical Documentation Reviewer    PROVIDER RESPONSE TEXT:    This patient has Right foot and calf pain due to PAD. Query created by: Nurys Kyle on 1/24/2023 6:42 AM      QUERY TEXT:    Patient admitted with Right foot and calf pain. Noted documentation of \"No urinary symptoms\", Possible UTI on 1/23 DS. In ED, MD noted patient had abdominal tenderness. In order to support the diagnosis of UTI, please include additional clinical indicators in your documentation. Or please document if the diagnosis of UTI has been ruled out after further study. The medical record reflects the following:    Risk Factors: 78year old female    Clinical Indicators:  UA Neg Nitrites, Large Leukocytes, > 100 WBC, 0-5 RBCs, 2 + bacteria  UCS >100,000 colonies, E Coli, Ampicillin, Gentamicin and Trimeth/Sulfa resistant  ED MD:  ?Abdominal: Tenderness: There is abdominal tenderness. ?    Treatment: UA, UCS, Rocephin while inpt, switch to Keflex on DC (per DS)    Thank you,  Raji Black RN, CDI  Options provided:  -- UTI present as evidenced by abdominal tenderness per ED assessment and UCx >100K e.coli with Ampicillin, Gentamicin and Trimeth/Sulfa resistance  -- UTI present as evidenced by, Please document evidence. -- UTI was ruled out  -- Other - I will add my own diagnosis  -- Disagree - Not applicable / Not valid  -- Disagree - Clinically unable to determine / Unknown  -- Refer to Clinical Documentation Reviewer    PROVIDER RESPONSE TEXT:    UTI is present as evidenced by abdominal tenderness per ED assessment and Ucx >100K e.coli with Ampicillin, Gentamicin and Trimeth/Sulfa resistance.     Query created by: Nurys Kyle on 1/24/2023 6:59 AM      Electronically signed by:  Janice Sandra MD 2/2/2023 8:59 AM

## 2023-02-27 NOTE — PROGRESS NOTES
I called patient and advised her to get her labs done prior to appointment. She is unable to due to transportation issues. There are no Wet Read(s) to document.